# Patient Record
Sex: MALE | Employment: OTHER | URBAN - METROPOLITAN AREA
[De-identification: names, ages, dates, MRNs, and addresses within clinical notes are randomized per-mention and may not be internally consistent; named-entity substitution may affect disease eponyms.]

---

## 2017-04-25 ENCOUNTER — DOCUMENTATION ONLY (OUTPATIENT)
Dept: LAB | Facility: CLINIC | Age: 72
End: 2017-04-25

## 2017-04-25 DIAGNOSIS — I10 HYPERTENSION GOAL BP (BLOOD PRESSURE) < 150/90: Primary | ICD-10-CM

## 2017-04-25 NOTE — PROGRESS NOTES
This patient has a lab only appointment on 4/27/2017 but does not have future orders. Please review, associate diagnosis and sign pending lab orders for the upcoming appointment.  There are no future scheduled appointments with Dr. Lopez at this time.      Thank you,    [facility]

## 2017-04-25 NOTE — PROGRESS NOTES
Need previsit labs-see orders and close encounter if nothing else needed./Shania Dunn,       4/27/17 lab apt

## 2017-04-27 DIAGNOSIS — I10 HYPERTENSION GOAL BP (BLOOD PRESSURE) < 150/90: ICD-10-CM

## 2017-04-27 LAB
ANION GAP SERPL CALCULATED.3IONS-SCNC: 11 MMOL/L (ref 3–14)
BUN SERPL-MCNC: 18 MG/DL (ref 7–30)
CALCIUM SERPL-MCNC: 8.9 MG/DL (ref 8.5–10.1)
CHLORIDE SERPL-SCNC: 104 MMOL/L (ref 94–109)
CO2 SERPL-SCNC: 25 MMOL/L (ref 20–32)
CREAT SERPL-MCNC: 0.85 MG/DL (ref 0.66–1.25)
GFR SERPL CREATININE-BSD FRML MDRD: 89 ML/MIN/1.7M2
GLUCOSE SERPL-MCNC: 97 MG/DL (ref 70–99)
POTASSIUM SERPL-SCNC: 4.2 MMOL/L (ref 3.4–5.3)
SODIUM SERPL-SCNC: 140 MMOL/L (ref 133–144)

## 2017-04-27 PROCEDURE — 36415 COLL VENOUS BLD VENIPUNCTURE: CPT | Performed by: FAMILY MEDICINE

## 2017-04-27 PROCEDURE — 80048 BASIC METABOLIC PNL TOTAL CA: CPT | Performed by: FAMILY MEDICINE

## 2017-06-09 ENCOUNTER — OFFICE VISIT (OUTPATIENT)
Dept: FAMILY MEDICINE | Facility: CLINIC | Age: 72
End: 2017-06-09
Payer: COMMERCIAL

## 2017-06-09 ENCOUNTER — RADIANT APPOINTMENT (OUTPATIENT)
Dept: GENERAL RADIOLOGY | Facility: CLINIC | Age: 72
End: 2017-06-09
Attending: FAMILY MEDICINE
Payer: COMMERCIAL

## 2017-06-09 VITALS
TEMPERATURE: 98.3 F | SYSTOLIC BLOOD PRESSURE: 123 MMHG | BODY MASS INDEX: 35.7 KG/M2 | OXYGEN SATURATION: 95 % | HEART RATE: 82 BPM | DIASTOLIC BLOOD PRESSURE: 77 MMHG | WEIGHT: 256 LBS

## 2017-06-09 DIAGNOSIS — J98.6 PARALYZED HEMIDIAPHRAGM: ICD-10-CM

## 2017-06-09 DIAGNOSIS — R07.9 ACUTE CHEST PAIN: Primary | ICD-10-CM

## 2017-06-09 DIAGNOSIS — R07.9 ACUTE CHEST PAIN: ICD-10-CM

## 2017-06-09 DIAGNOSIS — R93.89 ABNORMAL CHEST X-RAY: ICD-10-CM

## 2017-06-09 PROCEDURE — 93000 ELECTROCARDIOGRAM COMPLETE: CPT | Performed by: FAMILY MEDICINE

## 2017-06-09 PROCEDURE — 99214 OFFICE O/P EST MOD 30 MIN: CPT | Performed by: FAMILY MEDICINE

## 2017-06-09 PROCEDURE — 71020 XR CHEST 2 VW: CPT

## 2017-06-09 NOTE — PATIENT INSTRUCTIONS
Please call our [facility] Scheduling Line at [phone] to schedule your:    CT scan

## 2017-06-09 NOTE — PROGRESS NOTES
"SUBJECTIVE:  Rm Maldonado is a 71 year old male who presents to the office with the CC of back and chest pain.  Patient complains of chest pain that feels like a dull ache which he reports \"feels like a sore muscle\".  He reports that a few years ago he had some chest pain and he has had it on and off since then.   About a week ago he started to have pain again. This time it was first noticed in his back just medially to the left scapula.  He feels the pain also involves the left chest     Pain is associated with none.  Pain is exacerbated by no known provoking events.  Pain is mildly relieved by aspirin   Cardiac risk factors: abnormal lipids, family history, hypertension  He quit smoking in 1983  His mother had an MI at age 76 and passed away from that.     He does not exercise regularly.   He denies any chest pain or shortness of breath with exertion  He does do physical yard work around his house  1.5 month(s) ago he went on a bike ride and rode 5 miles without any symptoms. He reports that it took about 30 minute(s) to complete.     His PAST MEDICAL HISTORY has CAD listed but the patient absolutely denies any history of heart problems    Past Medical History:   Diagnosis Date     Arthritis      Coronary artery disease      Hemidiaphragm paralysis     left     History of hernia repair      HTN      Obesity      Other and unspecified hyperlipidemia          Current Outpatient Prescriptions:      lisinopril (PRINIVIL,ZESTRIL) 10 MG tablet, Take 1 tablet (10 mg) by mouth daily, Disp: 90 tablet, Rfl: 1     metoprolol (LOPRESSOR) 50 MG tablet, Take 1 tablet (50 mg) by mouth 2 times daily, Disp: 180 tablet, Rfl: 1     simvastatin (ZOCOR) 10 MG tablet, Take 1 tablet (10 mg) by mouth At Bedtime, Disp: 90 tablet, Rfl: 3     vitamin E 400 UNITS TABS, Take 400 Units by mouth daily, Disp: , Rfl:      calcium carbonate (OS-GENE 500 MG [place]. [place]) 500 MG tablet, Take 1,000 mg by mouth daily, Disp: , Rfl:      VITAMIN D, " CHOLECALCIFEROL, PO, Take 5,000 Units by mouth daily, Disp: , Rfl:      Ascorbic Acid (VITAMIN C PO), Take 2,000 mg by mouth., Disp: , Rfl:      lecithin 1200 MG CAPS, Take 400 mg by mouth daily , Disp: , Rfl:      aspirin 325 MG EC tablet, Take 325 mg by mouth daily., Disp: , Rfl:      fluocinonide (LIDEX) 0.05 % cream, Apply  topically 2 times daily. Apply to affected area as needed., Disp: 45 g, Rfl: 0     Omega-3 Fatty Acids (FISH OIL) 1200 MG capsule, Take 2 capsules by mouth daily , Disp: , Rfl:     Social History   Substance Use Topics     Smoking status: Former Smoker     Packs/day: 1.00     Years: 10.00     Types: Cigarettes, Pipe     Start date: 5/15/1968     Quit date: 8/19/1983     Smokeless tobacco: Never Used     Alcohol use Yes      Comment: occaional       ROS:CONSTITUTIONAL:NEGATIVE for fever, chills, change in weight    EXAM:  /77  Pulse 82  Temp 98.3  F (36.8  C) (Oral)  Wt 256 lb (116.1 kg)  SpO2 95%  BMI 35.7 kg/m2  GENERAL APPEARANCE: healthy, alert and no distress  EYES: EOMI,  PERRL, conjunctiva clear  HENT: ear canals and TM's normal.  Nose and mouth without ulcers, erythema or lesions  NECK: supple, nontender, no lymphadenopathy  RESP: lungs clear to auscultation - no rales, rhonchi or wheezes  CV: regular rates and rhythm, normal S1 S2, no murmur noted  Chest wall non tender.   ABDOMEN:  soft, nontender, no HSM or masses and bowel sounds normal  Extremities: trace edema, no  tenderness  NEURO: Normal strength and tone, sensory exam grossly normal,  normal speech and mentation  SKIN: no suspicious lesions or rashes  Back: non tender      Office EKG demonstrates:  normal axis, normal intervals, no acute ST/T changes c/w ischemia, appears normal, NSR,normal axis, normal intervals, no acute ST/T changes c/w ischemia,no LVH by voltage criteria,unchanged from previous tracings    Narrative              CHEST TWO VIEW   6/9/2017 10:15 AM     INDICATION: Chest pain,  unspecified.    COMPARISON: 10/9/2012             Impression             IMPRESSION: Marked elevation left hemidiaphragm. No new focal  infiltrates or other significant change. Stable heart size, likely  normal, although heart is partially obscured by the elevated  diaphragm. Degenerative changes thoracic spine.    JAVIER OCAMPO MD              Assessment  / IMPRESSION  Atypical chest pain   i am going to get a chest CT as he has a very abnormal CHEST X-RAY  Plan based on CT and any persisting symptom(s)

## 2017-06-09 NOTE — MR AVS SNAPSHOT
After Visit Summary   6/9/2017    Rm Maldonado    MRN: [id number]           Patient Information     Date Of Birth          1945        Visit Information        Provider Department      6/9/2017 9:30 AM Jeison Bob MD [facility]        Today's Diagnoses     Acute chest pain    -  1    Abnormal chest x-ray        Paralyzed hemidiaphragm          Care Instructions    Please call our [facility] Scheduling Line at [phone] to schedule your:    CT scan                   Follow-ups after your visit        Future tests that were ordered for you today     Open Future Orders        Priority Expected Expires Ordered    CT Chest Abdomen w Contrast Routine  6/9/2018 6/9/2017            Who to contact     If you have questions or need follow up information about today's clinic visit or your schedule please contact [facility] directly at [phone].  Normal or non-critical lab and imaging results will be communicated to you by [employer], letter or phone within 4 business days after the clinic has received the results. If you do not hear from us within 7 days, please contact the clinic through [employer] or phone. If you have a critical or abnormal lab result, we will notify you by phone as soon as possible.  Submit refill requests through [employer] or call your pharmacy and they will forward the refill request to us. Please allow 3 business days for your refill to be completed.          Additional Information About Your Visit        MyChart Information     [employer] gives you secure access to your electronic health record. If you see a primary care provider, you can also send messages to your care team and make appointments. If you have questions, please call your primary care clinic.  If you do not have a primary care provider, please call [phone] and they will assist you.        Care EveryWhere ID     This is your Care EveryWhere ID. This could be used by other  organizations to access your [place] medical records  [id number]        Your Vitals Were     Pulse Temperature Pulse Oximetry BMI (Body Mass Index)          82 98.3  F (36.8  C) (Oral) 95% 35.7 kg/m2         Blood Pressure from Last 3 Encounters:   06/09/17 123/77   11/14/16 130/86   08/18/16 104/76    Weight from Last 3 Encounters:   06/09/17 256 lb (116.1 kg)   11/14/16 248 lb (112.5 kg)   08/11/16 240 lb (108.9 kg)              We Performed the Following     EKG 12-lead complete w/read - Clinics        Primary Care Provider Office Phone # Fax #    Riddhi Lopez [phone]       [facility] [address] [address]        Thank you!     Thank you for choosing [facility]  for your care. Our goal is always to provide you with excellent care. Hearing back from our patients is one way we can continue to improve our services. Please take a few minutes to complete the written survey that you may receive in the mail after your visit with us. Thank you!             Your Updated Medication List - Protect others around you: Learn how to safely use, store and throw away your medicines at www.disposemymeds.org.          This list is accurate as of: 6/9/17 10:47 AM.  Always use your most recent med list.                   Brand Name Dispense Instructions for use    aspirin 325 MG EC tablet      Take 325 mg by mouth daily.       calcium carbonate 1250 MG tablet    OS-GENE 500 mg [place]. Ca     Take 1,000 mg by mouth daily       Fish Oil 1200 MG Caps      Take 2 capsules by mouth daily       fluocinonide 0.05 % cream    LIDEX    45 g    Apply  topically 2 times daily. Apply to affected area as needed.       lecithin 1200 MG Caps capsule    lecithin     Take 400 mg by mouth daily       lisinopril 10 MG tablet    PRINIVIL/ZESTRIL    90 tablet    Take 1 tablet (10 mg) by mouth daily       metoprolol 50 MG tablet    LOPRESSOR    180 tablet    Take 1 tablet (50 mg) by  mouth 2 times daily       simvastatin 10 MG tablet    ZOCOR    90 tablet    Take 1 tablet (10 mg) by mouth At Bedtime       VITAMIN C PO      Take 2,000 mg by mouth.       VITAMIN D (CHOLECALCIFEROL) PO      Take 5,000 Units by mouth daily       vitamin E 400 UNITS Tabs      Take 400 Units by mouth daily

## 2017-06-09 NOTE — NURSING NOTE
"Chief Complaint   Patient presents with     Chest Pain     left shoulder pain (discomfort)  no pain with sitting x 1 week     Cough     feels like something is in his throat       Initial /77  Pulse 82  Temp 98.3  F (36.8  C) (Oral)  Wt 256 lb (116.1 kg)  SpO2 95%  BMI 35.7 kg/m2 Estimated body mass index is 35.7 kg/(m^2) as calculated from the following:    Height as of 11/14/16: 5' 11\" (1.803 m).    Weight as of this encounter: 256 lb (116.1 kg).  Medication Reconciliation: complete   Elvira Cano M.A.      "

## 2017-06-12 ENCOUNTER — MYC MEDICAL ADVICE (OUTPATIENT)
Dept: FAMILY MEDICINE | Facility: CLINIC | Age: 72
End: 2017-06-12

## 2017-06-14 ENCOUNTER — RADIANT APPOINTMENT (OUTPATIENT)
Dept: CT IMAGING | Facility: CLINIC | Age: 72
End: 2017-06-14
Attending: FAMILY MEDICINE
Payer: COMMERCIAL

## 2017-06-14 DIAGNOSIS — R93.89 ABNORMAL CHEST X-RAY: ICD-10-CM

## 2017-06-14 DIAGNOSIS — J98.6 PARALYZED HEMIDIAPHRAGM: ICD-10-CM

## 2017-06-14 DIAGNOSIS — R07.9 ACUTE CHEST PAIN: ICD-10-CM

## 2017-06-14 PROCEDURE — 71260 CT THORAX DX C+: CPT | Mod: TC

## 2017-06-14 PROCEDURE — 74160 CT ABDOMEN W/CONTRAST: CPT | Mod: TC

## 2017-06-14 RX ORDER — IOPAMIDOL 755 MG/ML
100 INJECTION, SOLUTION INTRAVASCULAR ONCE
Status: COMPLETED | OUTPATIENT
Start: 2017-06-14 | End: 2017-06-14

## 2017-06-14 RX ADMIN — IOPAMIDOL 100 ML: 755 INJECTION, SOLUTION INTRAVASCULAR at 10:56

## 2017-06-14 NOTE — PROGRESS NOTES
Rm,  I have reviewed the report of the imaging test or tests that we recently ordered. The results were normal or considered normal for you. It did not identify any cause for the chest pains you had been having.if your symptom(s) are persisting please follow up with your primary medical provider in a few weeks.   Sincerely,   Jeison Bob

## 2017-09-15 DIAGNOSIS — I10 HYPERTENSION GOAL BP (BLOOD PRESSURE) < 150/90: ICD-10-CM

## 2017-09-15 RX ORDER — LISINOPRIL 10 MG/1
TABLET ORAL
Qty: 90 TABLET | Refills: 1 | Status: SHIPPED | OUTPATIENT
Start: 2017-09-15 | End: 2017-09-21 | Stop reason: SINTOL

## 2017-09-18 ENCOUNTER — DOCUMENTATION ONLY (OUTPATIENT)
Dept: LAB | Facility: CLINIC | Age: 72
End: 2017-09-18

## 2017-09-18 DIAGNOSIS — E78.5 HYPERLIPIDEMIA LDL GOAL <130: ICD-10-CM

## 2017-09-18 DIAGNOSIS — I10 HYPERTENSION GOAL BP (BLOOD PRESSURE) < 150/90: Primary | ICD-10-CM

## 2017-09-18 NOTE — PROGRESS NOTES
This patient has a lab only appointment on 9/20/2017 but does not have future orders. Please review, associate diagnosis and sign pending lab orders for the upcoming appointment.  He has an appointment with Dr. Lopez on 9/27/2017.    Thank you,    [facility]

## 2017-09-20 DIAGNOSIS — I10 HYPERTENSION GOAL BP (BLOOD PRESSURE) < 150/90: ICD-10-CM

## 2017-09-20 DIAGNOSIS — E78.5 HYPERLIPIDEMIA LDL GOAL <130: ICD-10-CM

## 2017-09-20 LAB
ANION GAP SERPL CALCULATED.3IONS-SCNC: 9 MMOL/L (ref 3–14)
BUN SERPL-MCNC: 14 MG/DL (ref 7–30)
CALCIUM SERPL-MCNC: 8.8 MG/DL (ref 8.5–10.1)
CHLORIDE SERPL-SCNC: 100 MMOL/L (ref 94–109)
CHOLEST SERPL-MCNC: 211 MG/DL
CO2 SERPL-SCNC: 28 MMOL/L (ref 20–32)
CREAT SERPL-MCNC: 1 MG/DL (ref 0.66–1.25)
GFR SERPL CREATININE-BSD FRML MDRD: 74 ML/MIN/1.7M2
GLUCOSE SERPL-MCNC: 108 MG/DL (ref 70–99)
HDLC SERPL-MCNC: 60 MG/DL
LDLC SERPL CALC-MCNC: 120 MG/DL
NONHDLC SERPL-MCNC: 151 MG/DL
POTASSIUM SERPL-SCNC: 4.1 MMOL/L (ref 3.4–5.3)
SODIUM SERPL-SCNC: 137 MMOL/L (ref 133–144)
TRIGL SERPL-MCNC: 155 MG/DL

## 2017-09-20 PROCEDURE — 36415 COLL VENOUS BLD VENIPUNCTURE: CPT | Performed by: FAMILY MEDICINE

## 2017-09-20 PROCEDURE — 80048 BASIC METABOLIC PNL TOTAL CA: CPT | Performed by: FAMILY MEDICINE

## 2017-09-20 PROCEDURE — 80061 LIPID PANEL: CPT | Performed by: FAMILY MEDICINE

## 2017-09-21 ENCOUNTER — OFFICE VISIT (OUTPATIENT)
Dept: FAMILY MEDICINE | Facility: CLINIC | Age: 72
End: 2017-09-21
Payer: COMMERCIAL

## 2017-09-21 VITALS
SYSTOLIC BLOOD PRESSURE: 132 MMHG | DIASTOLIC BLOOD PRESSURE: 79 MMHG | HEART RATE: 112 BPM | BODY MASS INDEX: 35.7 KG/M2 | WEIGHT: 256 LBS | OXYGEN SATURATION: 93 % | TEMPERATURE: 100.3 F

## 2017-09-21 DIAGNOSIS — J06.9 UPPER RESPIRATORY TRACT INFECTION, UNSPECIFIED TYPE: Primary | ICD-10-CM

## 2017-09-21 DIAGNOSIS — I10 HYPERTENSION GOAL BP (BLOOD PRESSURE) < 150/90: ICD-10-CM

## 2017-09-21 PROCEDURE — 99214 OFFICE O/P EST MOD 30 MIN: CPT | Performed by: FAMILY MEDICINE

## 2017-09-21 RX ORDER — CODEINE PHOSPHATE AND GUAIFENESIN 10; 100 MG/5ML; MG/5ML
1-2 SOLUTION ORAL EVERY 4 HOURS PRN
Qty: 180 ML | Refills: 0 | Status: SHIPPED | OUTPATIENT
Start: 2017-09-21 | End: 2018-06-06

## 2017-09-21 RX ORDER — LOSARTAN POTASSIUM 25 MG/1
25 TABLET ORAL DAILY
Qty: 30 TABLET | Refills: 0 | Status: SHIPPED | OUTPATIENT
Start: 2017-09-21 | End: 2017-09-27

## 2017-09-21 NOTE — MR AVS SNAPSHOT
After Visit Summary   9/21/2017    Rm Maldonado    MRN: [id number]           Patient Information     Date Of Birth          1945        Visit Information        Provider Department      9/21/2017 2:10 PM Jatinder Giraldo MD [facility]        Today's Diagnoses     Upper respiratory tract infection, unspecified type    -  1    Hypertension goal BP (blood pressure) < 150/90          Care Instructions    1. I think you have viral illness that should resolve with time. No need for antibiotics.    2. Robitussin with codeine as needed - no driving within 4 hours of taking this. Do not mix with alcohol or other sedatives.    3. The cough you have had for about one year is related to Lisinopril. Please stop that and the cough will resolve within 3 weeks.    4. Start Losartan 25 mg daily.    5. Keep your appointment with Dr. Lopez on 9/27/17.          Follow-ups after your visit        Your next 10 appointments already scheduled     Sep 27, 2017  9:15 AM CDT   [facility]    [address] [address] [phone]              Who to contact     If you have questions or need follow up information about today's clinic visit or your schedule please contact [facility] directly at [phone].  Normal or non-critical lab and imaging results will be communicated to you by MyChart, letter or phone within 4 business days after the clinic has received the results. If you do not hear from us within 7 days, please contact the clinic through MyChart or phone. If you have a critical or abnormal lab result, we will notify you by phone as soon as possible.  Submit refill requests through [employer] or call your pharmacy and they will forward the refill request to us. Please allow 3 business days for your refill to be completed.          Additional Information About Your Visit        [facility]  Information     [email or web address] gives you secure access to your electronic health record. If you see a primary care provider, you can also send messages to your care team and make appointments. If you have questions, please call your primary care clinic.  If you do not have a primary care provider, please call [phone] and they will assist you.        Care EveryWhere ID     This is your Care EveryWhere ID. This could be used by other organizations to access your [place] medical records  [id number]        Your Vitals Were     Pulse Temperature Pulse Oximetry BMI (Body Mass Index)          112 100.3  F (37.9  C) (Tympanic) 93% 35.7 kg/m2         Blood Pressure from Last 3 Encounters:   09/21/17 132/79   06/09/17 123/77   11/14/16 130/86    Weight from Last 3 Encounters:   09/21/17 256 lb (116.1 kg)   06/09/17 256 lb (116.1 kg)   11/14/16 248 lb (112.5 kg)              Today, you had the following     No orders found for display         Today's Medication Changes          These changes are accurate as of: 9/21/17  2:43 PM.  If you have any questions, ask your nurse or doctor.               Start taking these medicines.        Dose/Directions    guaiFENesin-codeine 100-10 MG/5ML Soln solution   Commonly known as:  ROBITUSSIN AC   Used for:  Upper respiratory tract infection, unspecified type   Started by:  Jatinder Giraldo MD        Dose:  1-2 tsp.   Take 5-10 mLs by mouth every 4 hours as needed for cough   Quantity:  180 mL   Refills:  0       losartan 25 MG tablet   Commonly known as:  COZAAR   Used for:  Hypertension goal BP (blood pressure) < 150/90   Started by:  Jatinder Giraldo MD        Dose:  25 mg   Take 1 tablet (25 mg) by mouth daily   Quantity:  30 tablet   Refills:  0            Where to get your medicines      These medications were sent to [facility] - [place], [place] - [address]  [address] [address]     Phone:  [phone]     losartan 25 MG tablet         Some  of these will need a paper prescription and others can be bought over the counter.  Ask your nurse if you have questions.     Bring a paper prescription for each of these medications     guaiFENesin-codeine 100-10 MG/5ML Soln solution                Primary Care Provider Office Phone # Fax #    Riddhi Jenny Lopez [phone] [address] [facility] [facility] [address]        Equal Access to Services     RADHA FELDMAN : Hadii aad ku hadasho Soomaali, waaxda luqadaha, qaybta kaalmada adeegyada, waxay idiin hayaan adeeg khaneesh laGladismichael . So [facility] [phone].    ATENCIÓN: Si habla espmelvi, tiene a piedra disposición servicios gratuitos de asistencia lingüística. Stephename al [phone].    We comply with applicable federal civil rights laws and [place] laws. We do not discriminate on the basis of race, color, national origin, age, disability sex, sexual orientation or gender identity.            Thank you!     Thank you for choosing [facility]  for your care. Our goal is always to provide you with excellent care. Hearing back from our patients is one way we can continue to improve our services. Please take a few minutes to complete the written survey that you may receive in the mail after your visit with us. Thank you!             Your Updated Medication List - Protect others around you: Learn how to safely use, store and throw away your medicines at www.disposemymeds.org.          This list is accurate as of: 9/21/17  2:43 PM.  Always use your most recent med list.                   Brand Name Dispense Instructions for use Diagnosis    aspirin 325 MG EC tablet      Take 325 mg by mouth daily.        calcium carbonate 1250 MG tablet    OS-GENE 500 mg [place]. [place]     Take 1,000 mg by mouth daily        Fish Oil 1200 MG Caps      Take 2 capsules by mouth daily        fluocinonide 0.05 % cream    LIDEX    45 g    Apply  topically 2 times daily. Apply to affected area as needed.    Eczema        guaiFENesin-codeine 100-10 MG/5ML Soln solution    ROBITUSSIN AC    180 mL    Take 5-10 mLs by mouth every 4 hours as needed for cough    Upper respiratory tract infection, unspecified type       lecithin 1200 MG Caps capsule    lecithin     Take 400 mg by mouth daily        lisinopril 10 MG tablet    PRINIVIL/ZESTRIL    90 tablet    TAKE 1 TABLET EVERY DAY    Hypertension goal BP (blood pressure) < 150/90       losartan 25 MG tablet    COZAAR    30 tablet    Take 1 tablet (25 mg) by mouth daily    Hypertension goal BP (blood pressure) < 150/90       metoprolol 50 MG tablet    LOPRESSOR    180 tablet    Take 1 tablet (50 mg) by mouth 2 times daily    Hypertension goal BP (blood pressure) < 150/90       simvastatin 10 MG tablet    ZOCOR    90 tablet    Take 1 tablet (10 mg) by mouth At Bedtime    Hyperlipidemia LDL goal <130       VITAMIN C PO      Take 2,000 mg by mouth.        VITAMIN D (CHOLECALCIFEROL) PO      Take 5,000 Units by mouth daily        vitamin E 400 UNITS Tabs      Take 400 Units by mouth daily

## 2017-09-21 NOTE — PATIENT INSTRUCTIONS
1. I think you have viral illness that should resolve with time. No need for antibiotics.    2. Robitussin with codeine as needed - no driving within 4 hours of taking this. Do not mix with alcohol or other sedatives.    3. The cough you have had for about one year is related to Lisinopril. Please stop that and the cough will resolve within 3 weeks.    4. Start Losartan 25 mg daily.    5. Keep your appointment with Dr. Lopez on 9/27/17.

## 2017-09-21 NOTE — NURSING NOTE
"Chief Complaint   Patient presents with     URI     Cold SX per pt x 1 day now        Initial /79  Pulse 112  Temp 100.3  F (37.9  C) (Tympanic)  Wt 256 lb (116.1 kg)  SpO2 93%  BMI 35.7 kg/m2 Estimated body mass index is 35.7 kg/(m^2) as calculated from the following:    Height as of 11/14/16: 5' 11\" (1.803 m).    Weight as of this encounter: 256 lb (116.1 kg).  Medication Reconciliation: complete      Devin Beltran MA    "

## 2017-09-21 NOTE — PROGRESS NOTES
HPI:    Rm is a 72 year old male here to discuss:    URI - since yesterday, he has had cough which is productive of non blood sputum. Denies shortness of breath. No ear pain, runny nose, sinus pain, sore throat.   Evaluation and treatment:    Should be self limited.   Robitussin AC prn - warnings discussed.    Cough - since 2016 he has had a different kind of cough. This is mild tickle in the throat without other symptoms.  Evaluation and treatment:    Most likely related to Lisinopril - see below.    HTN - not checking at home. Controlled in clinic.  Evaluation and treatment:    Lisinopril 10 mg qd - causing chronic mild cough (tickle in throat)   I asked him to stop Lisinopril   Start Losartan 25 mg qd.  Last Basic Metabolic Panel:  Lab Results   Component Value Date     09/20/2017      Lab Results   Component Value Date    POTASSIUM 4.1 09/20/2017     Lab Results   Component Value Date    CHLORIDE 100 09/20/2017     Lab Results   Component Value Date    GENE 8.8 09/20/2017     Lab Results   Component Value Date    CO2 28 09/20/2017     Lab Results   Component Value Date    BUN 14 09/20/2017     Lab Results   Component Value Date    CR 1.00 09/20/2017     Lab Results   Component Value Date     09/20/2017     Paralyzed left hemidiaphragm - asymptomatic.    ROS:   Const: No weight changes.   ENT: No sore throat or ear pain.   Resp: No shortness of breath.   CV: No chest pain, dizziness or cardiac palpitations.   GI: No nausea, vomiting, diarrhea or constipation. Denies blood in stools or black stools.   : No dysuria, frequency or hematuria.     SH:     . Three kids. Works as . Non smoker. Etoh occ. Caffeine 5/day.    Exam:    /79  Pulse 112  Temp 100.3  F (37.9  C) (Tympanic)  Wt 256 lb (116.1 kg)  SpO2 93%  BMI 35.7 kg/m2    Gen: Healthy appearing male in no acute distress   Neck: No enlarged lymph nodes, thyromegally or other masses.   Lungs: Left lower  lung field with almost no air movement. This is baseline for him. Otherwise clear to ascultation.   CV: Heart RRR with no murmurs. No JVD, carotid bruits or leg edema.     Assessment and Plan - Decision Making    1. Upper respiratory tract infection, unspecified type  Per HPI  - guaiFENesin-codeine (ROBITUSSIN AC) 100-10 MG/5ML SOLN solution; Take 5-10 mLs by mouth every 4 hours as needed for cough  Dispense: 180 mL; Refill: 0    2. Hypertension goal BP (blood pressure) < 150/90  Per HPI  - losartan (COZAAR) 25 MG tablet; Take 1 tablet (25 mg) by mouth daily  Dispense: 30 tablet; Refill: 0      Written instructions given as follows:    Patient Instructions   1. I think you have viral illness that should resolve with time. No need for antibiotics.    2. Robitussin with codeine as needed - no driving within 4 hours of taking this. Do not mix with alcohol or other sedatives.    3. The cough you have had for about one year is related to Lisinopril. Please stop that and the cough will resolve within 3 weeks.    4. Start Losartan 25 mg daily.    5. Keep your appointment with Dr. Lopez on 9/27/17.

## 2017-09-25 NOTE — PROGRESS NOTES
SUBJECTIVE:   Rm Maldonado is a 72 year old male who presents to clinic today for the following health issues:      Hyperlipidemia Follow-Up      Rate your low fat/cholesterol diet?: good    Taking statin?  Yes    Other lipid medications/supplements?:  Fish oil/Omega 3, dose 1200mg without side effects        Amount of exercise or physical activity: None    Problems taking medications regularly: No    Medication side effects: none  Diet: low salt      ENT Symptoms             Symptoms: cc Present Absent Comment   Fever/Chills  x  Ws having chills/sweats    Fatigue  x     Muscle Aches   x    Eye Irritation   x    Sneezing   x    Nasal Quan/Drg   x    Sinus Pressure/Pain   x    Loss of smell   x    Dental pain   x    Sore Throat   x    Swollen Glands   x    Ear Pain/Fullness   x    Cough  x  Cough has improved some but still coughing when supine, dry cough   Wheeze  x     Chest Pain   x    Shortness of breath   x    Rash   x    Other    Pt reports no shortness of breath, LE swelling or chest pain,      Symptom duration:  2 weeks   Symptom severity:  Moderate   Treatments tried:  Robitussin   Contacts:  none           Problem list and histories reviewed & adjusted, as indicated.  Additional history: as documented    Patient Active Problem List   Diagnosis     Exogenous obesity     Hypertension goal BP (blood pressure) < 150/90     Eczema     Advanced directives, counseling/discussion     Vitamin D deficiency     Colon polyp     Paralyzed hemidiaphragm     Hyperlipidemia LDL goal <130     Past Surgical History:   Procedure Laterality Date     ABDOMEN SURGERY       BIOPSY       COLONOSCOPY       COLONOSCOPY WITH CO2 INSUFFLATION N/A 8/18/2016    Procedure: COLONOSCOPY WITH CO2 INSUFFLATION;  Surgeon: Ariel Manzanares MD;  Location: [facility]     EYE SURGERY      left cataract      HERNIA REPAIR       HERNIA REPAIR       TONSILLECTOMY         Social History   Substance Use Topics     Smoking status: Former Smoker      Packs/day: 1.00     Years: 10.00     Types: Cigarettes, Pipe     Start date: 5/15/1968     Quit date: 8/19/1983     Smokeless tobacco: Never Used     Alcohol use Yes      Comment: Minimal to light     Family History   Problem Relation Age of Onset     C.A.D. Mother      HEART DISEASE Mother      DIABETES Mother      Obesity Mother      Alcohol/Drug Father      Obesity Daughter          Current Outpatient Prescriptions   Medication Sig Dispense Refill     losartan (COZAAR) 25 MG tablet Take 1 tablet (25 mg) by mouth daily 90 tablet 1     metoprolol (LOPRESSOR) 50 MG tablet Take 1 tablet (50 mg) by mouth 2 times daily 180 tablet 1     simvastatin (ZOCOR) 10 MG tablet Take 1 tablet (10 mg) by mouth At Bedtime 90 tablet 3     furosemide (LASIX) 20 MG tablet Take 1 tablet (20 mg) by mouth 2 times daily 60 tablet 1     guaiFENesin-codeine (ROBITUSSIN AC) 100-10 MG/5ML SOLN solution Take 5-10 mLs by mouth every 4 hours as needed for cough 180 mL 0     vitamin E 400 UNITS TABS Take 400 Units by mouth daily       calcium carbonate (OS-GENE 500 MG [place]. [place]) 500 MG tablet Take 1,000 mg by mouth daily       VITAMIN D, CHOLECALCIFEROL, PO Take 5,000 Units by mouth daily       Ascorbic Acid (VITAMIN C PO) Take 2,000 mg by mouth.       lecithin 1200 MG CAPS Take 400 mg by mouth daily        aspirin 325 MG EC tablet Take 325 mg by mouth daily.       fluocinonide (LIDEX) 0.05 % cream Apply  topically 2 times daily. Apply to affected area as needed. 45 g 0     Omega-3 Fatty Acids (FISH OIL) 1200 MG capsule Take 2 capsules by mouth daily        azithromycin (ZITHROMAX) 250 MG tablet Two tablets first day, then one tablet daily for four days. 6 tablet 0     BP Readings from Last 3 Encounters:   09/27/17 129/81   09/26/17 138/84   09/21/17 132/79    Wt Readings from Last 3 Encounters:   09/27/17 256 lb 3.2 oz (116.2 kg)   09/21/17 256 lb (116.1 kg)   06/09/17 256 lb (116.1 kg)                  Labs reviewed in EPIC        Reviewed  "and updated as needed this visit by clinical staffTobacco  Allergies  Meds  Problems  Med Hx  Surg Hx  Fam Hx  Soc Hx        Reviewed and updated as needed this visit by Provider  Allergies  Meds  Problems         ROS:  Constitutional, HEENT, cardiovascular, pulmonary, gi and gu systems are negative, except as otherwise noted.      OBJECTIVE:   /81  Pulse 78  Temp 98  F (36.7  C) (Oral)  Ht 5' 10.25\" (1.784 m)  Wt 256 lb 3.2 oz (116.2 kg)  SpO2 96%  BMI 36.5 kg/m2  Body mass index is 36.5 kg/(m^2).  GENERAL: healthy, alert and no distress  EYES: Eyes grossly normal to inspection, PERRL and conjunctivae and sclerae normal  HENT: ear canals and TM's normal, nose and mouth without ulcers or lesions  NECK: no adenopathy, no asymmetry, masses, or scars and thyroid normal to palpation  RESP: lungs clear to auscultation - no rales, rhonchi or wheezes  CV: regular rate and rhythm, normal S1 S2, no S3 or S4, no murmur, click or rub, no peripheral edema and peripheral pulses strong  MS: no gross musculoskeletal defects noted, no edema  SKIN: no suspicious lesions or rashes  PSYCH: mentation appears normal, affect normal/bright    Diagnostic Test Results: left hemidiaphragn elevated - stable  Patchy infiltrate/increased markings RLL      ASSESSMENT/PLAN:     (R05) Cough  (primary encounter diagnosis)  Comment: uncertain etiology cannot r/o CHF  Plan: XR Chest 2 Views, Echocardiogram Complete,         BNP-N terminal pro, furosemide (LASIX) 20 MG         tablet, DISCONTINUED: furosemide (LASIX) 20 MG         tablet        Check BNP, trial of lasix 20 mg daily for next 5 days  If no change or worsening, will treat for pneumonia  Need to consider losartan as source of cough but has tolerated for years.      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

## 2017-09-26 ENCOUNTER — ALLIED HEALTH/NURSE VISIT (OUTPATIENT)
Dept: FAMILY MEDICINE | Facility: CLINIC | Age: 72
End: 2017-09-26
Payer: COMMERCIAL

## 2017-09-26 VITALS — HEART RATE: 96 BPM | SYSTOLIC BLOOD PRESSURE: 138 MMHG | DIASTOLIC BLOOD PRESSURE: 84 MMHG

## 2017-09-26 DIAGNOSIS — Z01.30 BP CHECK: Primary | ICD-10-CM

## 2017-09-26 PROCEDURE — 99207 ZZC NO CHARGE NURSE ONLY: CPT | Performed by: FAMILY MEDICINE

## 2017-09-26 NOTE — PROGRESS NOTES
Rm Maldonado is enrolled/participating in the retail pharmacy Blood Pressure Goals Achievement Program (BPGAP).  Rm Maldonado was evaluated at [facility] on September 26, 2017 at which time his blood pressure was:    BP Readings from Last 3 Encounters:   09/26/17 138/84   09/21/17 132/79   06/09/17 123/77     Reviewed lifestyle modifications for blood pressure control and reduction: including making healthy food choices, managing weight, getting regular exercise, smoking cessation, reducing alcohol consumption, monitoring blood pressure regularly.     Rm Maldonado is not experiencing symptoms.    Follow-Up: BP is at goal of < 150/90 mmHg (patient 60+ years of age without diabetes).  Recommended follow-up at pharmacy in 6 months.     Recommendation to Provider: Riddhi Lopez    Rm Maldonado was evaluated for enrollment into the PGEN study today.    Patient eligible for enrollment:  No  Patient interested in enrollment:  No    Completed by: Victor M Steiner RPh.  [facility]  [phone]

## 2017-09-26 NOTE — MR AVS SNAPSHOT
After Visit Summary   9/26/2017    Rm Maldonado    MRN: [id number]           Patient Information     Date Of Birth          1945        Visit Information        Provider Department      9/26/2017 11:04 AM Riddhi Lopez MD [facility]        Today's Diagnoses     BP check    -  1       Follow-ups after your visit        Your next 10 appointments already scheduled     Sep 27, 2017  9:15 AM CDT   SHORT with Riddhi Lopez MD   [facility]    [address] [address] [phone]              Who to contact     If you have questions or need follow up information about today's clinic visit or your schedule please contact [facility] directly at [phone].  Normal or non-critical lab and imaging results will be communicated to you by MyChart, letter or phone within 4 business days after the clinic has received the results. If you do not hear from us within 7 days, please contact the clinic through MyChart or phone. If you have a critical or abnormal lab result, we will notify you by phone as soon as possible.  Submit refill requests through Fotolog or call your pharmacy and they will forward the refill request to us. Please allow 3 business days for your refill to be completed.          Additional Information About Your Visit        MyChart Information     [employer] gives you secure access to your electronic health record. If you see a primary care provider, you can also send messages to your care team and make appointments. If you have questions, please call your primary care clinic.  If you do not have a primary care provider, please call [phone] and they will assist you.        Care EveryWhere ID     This is your Care EveryWhere ID. This could be used by other organizations to access your [place] medical records  [id number]        Your Vitals Were     Pulse                   96             Blood Pressure from Last 3 Encounters:   09/26/17 138/84   09/21/17 132/79   06/09/17 123/77    Weight from Last 3 Encounters:   09/21/17 256 lb (116.1 kg)   06/09/17 256 lb (116.1 kg)   11/14/16 248 lb (112.5 kg)              Today, you had the following     No orders found for display       Primary Care Provider Office Phone # Fax #    Riddhi Jenny Lopez [phone] [address] [address] [facility] [address]        Equal Access to Services     [facility]: Hadii aad ku hadasho Soomaali, waaxda luqadaha, qaybta kaalmada adean, rodrigo dawn . So [facility] [phone].    ATENCIÓN: Si habla español, tiene a piedra disposición servicios gratuitos de asistencia lingüística. [facility] [phone].    We comply with applicable federal civil rights laws and [place] laws. We do not discriminate on the basis of race, color, national origin, age, disability sex, sexual orientation or gender identity.            Thank you!     Thank you for choosing [facility]  for your care. Our goal is always to provide you with excellent care. Hearing back from our patients is one way we can continue to improve our services. Please take a few minutes to complete the written survey that you may receive in the mail after your visit with us. Thank you!             Your Updated Medication List - Protect others around you: Learn how to safely use, store and throw away your medicines at www.disposemymeds.org.          This list is accurate as of: 9/26/17 11:14 AM.  Always use your most recent med list.                   Brand Name Dispense Instructions for use Diagnosis    aspirin 325 MG EC tablet      Take 325 mg by mouth daily.        calcium carbonate 1250 MG tablet    OS-GENE 500 mg [place]. [place]     Take 1,000 mg by mouth daily        Fish Oil 1200 MG Caps      Take 2 capsules by mouth daily        fluocinonide 0.05 % cream    LIDEX    45 g    Apply  topically 2 times daily. Apply to  affected area as needed.    Eczema       guaiFENesin-codeine 100-10 MG/5ML Soln solution    ROBITUSSIN AC    180 mL    Take 5-10 mLs by mouth every 4 hours as needed for cough    Upper respiratory tract infection, unspecified type       lecithin 1200 MG Caps capsule    lecithin     Take 400 mg by mouth daily        losartan 25 MG tablet    COZAAR    30 tablet    Take 1 tablet (25 mg) by mouth daily    Hypertension goal BP (blood pressure) < 150/90       metoprolol 50 MG tablet    LOPRESSOR    180 tablet    Take 1 tablet (50 mg) by mouth 2 times daily    Hypertension goal BP (blood pressure) < 150/90       simvastatin 10 MG tablet    ZOCOR    90 tablet    Take 1 tablet (10 mg) by mouth At Bedtime    Hyperlipidemia LDL goal <130       VITAMIN C PO      Take 2,000 mg by mouth.        VITAMIN D (CHOLECALCIFEROL) PO      Take 5,000 Units by mouth daily        vitamin E 400 UNITS Tabs      Take 400 Units by mouth daily

## 2017-09-27 ENCOUNTER — OFFICE VISIT (OUTPATIENT)
Dept: FAMILY MEDICINE | Facility: CLINIC | Age: 72
End: 2017-09-27
Payer: COMMERCIAL

## 2017-09-27 ENCOUNTER — RADIANT APPOINTMENT (OUTPATIENT)
Dept: GENERAL RADIOLOGY | Facility: CLINIC | Age: 72
End: 2017-09-27
Attending: FAMILY MEDICINE
Payer: COMMERCIAL

## 2017-09-27 VITALS
WEIGHT: 256.2 LBS | SYSTOLIC BLOOD PRESSURE: 129 MMHG | OXYGEN SATURATION: 96 % | BODY MASS INDEX: 36.68 KG/M2 | HEART RATE: 78 BPM | DIASTOLIC BLOOD PRESSURE: 81 MMHG | HEIGHT: 70 IN | TEMPERATURE: 98 F

## 2017-09-27 DIAGNOSIS — R05.9 COUGH: Primary | ICD-10-CM

## 2017-09-27 DIAGNOSIS — J98.6 PARALYZED HEMIDIAPHRAGM: ICD-10-CM

## 2017-09-27 DIAGNOSIS — E78.5 HYPERLIPIDEMIA LDL GOAL <130: ICD-10-CM

## 2017-09-27 DIAGNOSIS — R05.9 COUGH: ICD-10-CM

## 2017-09-27 DIAGNOSIS — I10 HYPERTENSION GOAL BP (BLOOD PRESSURE) < 150/90: ICD-10-CM

## 2017-09-27 LAB — NT-PROBNP SERPL-MCNC: 35 PG/ML (ref 0–125)

## 2017-09-27 PROCEDURE — 83880 ASSAY OF NATRIURETIC PEPTIDE: CPT | Performed by: FAMILY MEDICINE

## 2017-09-27 PROCEDURE — 99214 OFFICE O/P EST MOD 30 MIN: CPT | Performed by: FAMILY MEDICINE

## 2017-09-27 PROCEDURE — 71020 XR CHEST 2 VW: CPT

## 2017-09-27 PROCEDURE — 36415 COLL VENOUS BLD VENIPUNCTURE: CPT | Performed by: FAMILY MEDICINE

## 2017-09-27 RX ORDER — LOSARTAN POTASSIUM 25 MG/1
25 TABLET ORAL DAILY
Qty: 90 TABLET | Refills: 1 | Status: SHIPPED | OUTPATIENT
Start: 2017-09-27 | End: 2017-12-05

## 2017-09-27 RX ORDER — FUROSEMIDE 20 MG
20 TABLET ORAL 2 TIMES DAILY
Qty: 60 TABLET | Refills: 1 | Status: SHIPPED | OUTPATIENT
Start: 2017-09-27 | End: 2017-09-27

## 2017-09-27 RX ORDER — FUROSEMIDE 20 MG
20 TABLET ORAL 2 TIMES DAILY
Qty: 60 TABLET | Refills: 1 | Status: SHIPPED | OUTPATIENT
Start: 2017-09-27 | End: 2018-06-06

## 2017-09-27 RX ORDER — SIMVASTATIN 10 MG
10 TABLET ORAL AT BEDTIME
Qty: 90 TABLET | Refills: 3 | Status: SHIPPED | OUTPATIENT
Start: 2017-09-27 | End: 2018-06-19

## 2017-09-27 RX ORDER — METOPROLOL TARTRATE 50 MG
50 TABLET ORAL 2 TIMES DAILY
Qty: 180 TABLET | Refills: 1 | Status: SHIPPED | OUTPATIENT
Start: 2017-09-27 | End: 2018-03-22

## 2017-09-27 NOTE — PATIENT INSTRUCTIONS
Take lasix in the morning right away and at 1:00 PM everyday for the next 5 days.      Please  call [phone] to schedule your heart ultrasound.      Send [employer] message on Monday to Riddhi Lopez MD to let her know how you are doing.

## 2017-09-27 NOTE — MR AVS SNAPSHOT
After Visit Summary   9/27/2017    Rm Maldonado    MRN: [id number]           Patient Information     Date Of Birth          1945        Visit Information        Provider Department      9/27/2017 9:15 AM Riddhi Lopez MD [facility]        Today's Diagnoses     Cough    -  1    Paralyzed hemidiaphragm        Hypertension goal BP (blood pressure) < 150/90        Hyperlipidemia LDL goal <130          Care Instructions    Take lasix in the morning right away and at 1:00 PM everyday for the next 5 days.      Please  call [phone] to schedule your heart ultrasound.      Send Go World! message on Monday to Riddhi Lopez MD to let her know how you are doing.          Follow-ups after your visit        Future tests that were ordered for you today     Open Future Orders        Priority Expected Expires Ordered    Echocardiogram Complete Routine  9/27/2018 9/27/2017            Who to contact     If you have questions or need follow up information about today's clinic visit or your schedule please contact [facility] directly at [phone].  Normal or non-critical lab and imaging results will be communicated to you by Simracewayhart, letter or phone within 4 business days after the clinic has received the results. If you do not hear from us within 7 days, please contact the clinic through Go World! or phone. If you have a critical or abnormal lab result, we will notify you by phone as soon as possible.  Submit refill requests through [employer] World! or call your pharmacy and they will forward the refill request to us. Please allow 3 business days for your refill to be completed.          Additional Information About Your Visit        Simracewayhart Information     [employer]! gives you secure access to your electronic health record. If you see a primary care provider, you can also send messages to your care team and make appointments. If you have questions, please call your primary care clinic.   "If you do not have a primary care provider, please call [phone] and they will assist you.        Care EveryWhere ID     This is your Care EveryWhere ID. This could be used by other organizations to access your [place] medical records  [id number]        Your Vitals Were     Pulse Temperature Height Pulse Oximetry BMI (Body Mass Index)       78 98  F (36.7  C) (Oral) 5' 10.25\" (1.784 m) 96% 36.5 kg/m2        Blood Pressure from Last 3 Encounters:   09/27/17 129/81   09/26/17 138/84   09/21/17 132/79    Weight from Last 3 Encounters:   09/27/17 256 lb 3.2 oz (116.2 kg)   09/21/17 256 lb (116.1 kg)   06/09/17 256 lb (116.1 kg)              We Performed the Following     BNP-N terminal pro          Today's Medication Changes          These changes are accurate as of: 9/27/17 10:22 AM.  If you have any questions, ask your nurse or doctor.               Start taking these medicines.        Dose/Directions    furosemide 20 MG tablet   Commonly known as:  LASIX   Used for:  Cough   Started by:  Riddhi Lopez MD        Dose:  20 mg   Take 1 tablet (20 mg) by mouth 2 times daily   Quantity:  60 tablet   Refills:  1            Where to get your medicines      These medications were sent to [address], [place] [id number]     Phone:  [phone]     furosemide 20 MG tablet         These medications were sent to [facility] - [facility] [address]  [address] [address]     Phone:  [phone]     losartan 25 MG tablet    metoprolol 50 MG tablet    simvastatin 10 MG tablet                Primary Care Provider Office Phone # Fax #    Riddhi Lopez [phone] [address] [place] [address]        Equal Access to Services     [facility]: Hadii melvin harris hadasho Soomaali, waaxda luqadaha, qaybta kaalmada carito, rodrigo larry " carlos dawn ah. So [facility] [phone].    ATENCIÓN: Si bala law, tiene a piedra disposición servicios gratuitos de asistencia lingüística. [facility]ian [phone].    We comply with applicable federal civil rights laws and [place] laws. We do not discriminate on the basis of race, color, national origin, age, disability sex, sexual orientation or gender identity.            Thank you!     Thank you for choosing [facility]  for your care. Our goal is always to provide you with excellent care. Hearing back from our patients is one way we can continue to improve our services. Please take a few minutes to complete the written survey that you may receive in the mail after your visit with us. Thank you!             Your Updated Medication List - Protect others around you: Learn how to safely use, store and throw away your medicines at www.disposemymeds.org.          This list is accurate as of: 9/27/17 10:22 AM.  Always use your most recent med list.                   Brand Name Dispense Instructions for use Diagnosis    aspirin 325 MG EC tablet      Take 325 mg by mouth daily.        calcium carbonate 1250 MG tablet    OS-GENE 500 mg [place]. [place]     Take 1,000 mg by mouth daily        Fish Oil 1200 MG Caps      Take 2 capsules by mouth daily        fluocinonide 0.05 % cream    LIDEX    45 g    Apply  topically 2 times daily. Apply to affected area as needed.    Eczema       furosemide 20 MG tablet    LASIX    60 tablet    Take 1 tablet (20 mg) by mouth 2 times daily    Cough       guaiFENesin-codeine 100-10 MG/5ML Soln solution    ROBITUSSIN AC    180 mL    Take 5-10 mLs by mouth every 4 hours as needed for cough    Upper respiratory tract infection, unspecified type       lecithin 1200 MG Caps capsule    lecithin     Take 400 mg by mouth daily        losartan 25 MG tablet    COZAAR    90 tablet    Take 1 tablet (25 mg) by mouth daily    Hypertension goal BP (blood pressure) < 150/90       metoprolol 50 MG  tablet    LOPRESSOR    180 tablet    Take 1 tablet (50 mg) by mouth 2 times daily    Hypertension goal BP (blood pressure) < 150/90       simvastatin 10 MG tablet    ZOCOR    90 tablet    Take 1 tablet (10 mg) by mouth At Bedtime    Hyperlipidemia LDL goal <130       VITAMIN C PO      Take 2,000 mg by mouth.        VITAMIN D (CHOLECALCIFEROL) PO      Take 5,000 Units by mouth daily        vitamin E 400 UNITS Tabs      Take 400 Units by mouth daily

## 2017-09-27 NOTE — NURSING NOTE
"Chief Complaint   Patient presents with     Lipids     Cough     x 2 weeks       Initial /81  Pulse 78  Temp 98  F (36.7  C) (Oral)  Ht 5' 10.25\" (1.784 m)  Wt 256 lb 3.2 oz (116.2 kg)  SpO2 96%  BMI 36.5 kg/m2 Estimated body mass index is 36.5 kg/(m^2) as calculated from the following:    Height as of this encounter: 5' 10.25\" (1.784 m).    Weight as of this encounter: 256 lb 3.2 oz (116.2 kg)..  BP completed using cuff size: andreas Ko CMA    "

## 2017-10-01 ENCOUNTER — MYC MEDICAL ADVICE (OUTPATIENT)
Dept: FAMILY MEDICINE | Facility: CLINIC | Age: 72
End: 2017-10-01

## 2017-10-02 ENCOUNTER — TELEPHONE (OUTPATIENT)
Dept: FAMILY MEDICINE | Facility: CLINIC | Age: 72
End: 2017-10-02

## 2017-10-02 ENCOUNTER — MYC MEDICAL ADVICE (OUTPATIENT)
Dept: FAMILY MEDICINE | Facility: CLINIC | Age: 72
End: 2017-10-02

## 2017-10-02 NOTE — TELEPHONE ENCOUNTER
[facility] Pharmacist is given orders that Lisinopril was d/c'd due to side effects of cough.   Losartan 25 mg is current medication therapy.   Verbalized good understanding.   Samia Hernandez RN

## 2017-10-02 NOTE — TELEPHONE ENCOUNTER
Pharmacy is looking for a clarification.    [facility]  Phone [phone]  Fax- [phone]    Pharmacy states- RX for Losartan 25 mg tab, and patient also has active RX for Lisinopril 10 mg tab. Please clairfy if current therapy is Losartan 25 mg or Lisinopril 10 mg tab.

## 2017-10-02 NOTE — TELEPHONE ENCOUNTER
Per protocol, will route encounter to be cosigned by provider for Verbal Orders.  Samia Hernandez RN

## 2017-10-19 ENCOUNTER — NURSE TRIAGE (OUTPATIENT)
Dept: NURSING | Facility: CLINIC | Age: 72
End: 2017-10-19

## 2017-10-20 NOTE — TELEPHONE ENCOUNTER
Constipation, which is not normally a problem for Rm.  Took Dulcolax tonight, wondering what else he may try?  Has pressure, no pain.    Additional Information    Over-The-Counter (OTC) medicines for mild constipation (all triage questions negative)    Protocols used: CONSTIPATION-ADULT-AH

## 2017-12-29 ENCOUNTER — ALLIED HEALTH/NURSE VISIT (OUTPATIENT)
Dept: FAMILY MEDICINE | Facility: CLINIC | Age: 72
End: 2017-12-29
Payer: COMMERCIAL

## 2017-12-29 VITALS — DIASTOLIC BLOOD PRESSURE: 80 MMHG | SYSTOLIC BLOOD PRESSURE: 138 MMHG

## 2017-12-29 DIAGNOSIS — Z01.30 BP CHECK: ICD-10-CM

## 2017-12-29 DIAGNOSIS — I10 HYPERTENSION GOAL BP (BLOOD PRESSURE) < 150/90: Primary | ICD-10-CM

## 2017-12-29 PROCEDURE — 99207 ZZC NO CHARGE NURSE ONLY: CPT | Performed by: FAMILY MEDICINE

## 2017-12-29 NOTE — PROGRESS NOTES
Rm Maldonado is enrolled/participating in the retail pharmacy Blood Pressure Goals Achievement Program (BPGAP).  Rm Maldonado was evaluated at [facility] on December 29, 2017 at which time his blood pressure was:    BP Readings from Last 3 Encounters:   12/29/17 138/80   09/27/17 129/81   09/26/17 138/84     Reviewed lifestyle modifications for blood pressure control and reduction: including making healthy food choices, managing weight, getting regular exercise, smoking cessation, reducing alcohol consumption, monitoring blood pressure regularly.     Rm Maldonado is not experiencing symptoms.    Follow-Up: BP is at goal of < 140/90mmHg (patient 18+ years of age with or without diabetes).  Recommended follow-up at pharmacy in 6 months.     Recommendation to Provider: .    Rm Maldonado was evaluated for enrollment into the PGEN study today.    Patient eligible for enrollment:  Unknown  Patient interested in enrollment:  Unknown    Completed by: Blossom Jay [facility]    [phone]

## 2017-12-29 NOTE — MR AVS SNAPSHOT
After Visit Summary   12/29/2017    Rm Maldonado    MRN: [id number]           Patient Information     Date Of Birth          1945        Visit Information        Provider Department      12/29/2017 3:52 PM Riddhi Lopez MD [facility]        Today's Diagnoses     Hypertension goal BP (blood pressure) < 150/90    -  1    BP check           Follow-ups after your visit        Who to contact     If you have questions or need follow up information about today's clinic visit or your schedule please contact [facility] directly at [phone].  Normal or non-critical lab and imaging results will be communicated to you by LabMindshart, letter or phone within 4 business days after the clinic has received the results. If you do not hear from us within 7 days, please contact the clinic through SoundTagt or phone. If you have a critical or abnormal lab result, we will notify you by phone as soon as possible.  Submit refill requests through Livemap or call your pharmacy and they will forward the refill request to us. Please allow 3 business days for your refill to be completed.          Additional Information About Your Visit        MyChart Information     Livemap gives you secure access to your electronic health record. If you see a primary care provider, you can also send messages to your care team and make appointments. If you have questions, please call your primary care clinic.  If you do not have a primary care provider, please call [phone] and they will assist you.        Care EveryWhere ID     This is your Care EveryWhere ID. This could be used by other organizations to access your [place] medical records  [id number]         Blood Pressure from Last 3 Encounters:   12/29/17 138/80   09/27/17 129/81   09/26/17 138/84    Weight from Last 3 Encounters:   09/27/17 256 lb 3.2 oz (116.2 kg)   09/21/17 256 lb (116.1 kg)   06/09/17 256 lb (116.1 kg)              Today, you  had the following     No orders found for display       Primary Care Provider Office Phone # Fax #    Riddhi Lopez [phone] [address] [facility] [address]        Equal Access to Services     RADHA FELDMAN : Hadii melvin steven katerina Sobassem, waaxda luqadaha, qaybta kaalmada adedeniseda, rodrigo gonzalez nereyda foster. So [facility] [phone].    ATENCIÓN: Si habla español, tiene a piedra disposición servicios gratuitos de asistencia lingüística. Llame al [phone].    We comply with applicable federal civil rights laws and [place] laws. We do not discriminate on the basis of race, color, national origin, age, disability, sex, sexual orientation, or gender identity.            Thank you!     Thank you for choosing [facility]  for your care. Our goal is always to provide you with excellent care. Hearing back from our patients is one way we can continue to improve our services. Please take a few minutes to complete the written survey that you may receive in the mail after your visit with us. Thank you!             Your Updated Medication List - Protect others around you: Learn how to safely use, store and throw away your medicines at www.disposemymeds.org.          This list is accurate as of: 12/29/17  3:57 PM.  Always use your most recent med list.                   Brand Name Dispense Instructions for use Diagnosis    aspirin 325 MG EC tablet      Take 325 mg by mouth daily.        azithromycin 250 MG tablet    ZITHROMAX    6 tablet    Two tablets first day, then one tablet daily for four days.    Pneumonia of right lower lobe due to infectious organism (H)       calcium carbonate 1250 MG tablet    OS-GENE 500 mg [place]. Ca     Take 1,000 mg by mouth daily        fish Oil 1200 MG capsule      Take 2 capsules by mouth daily        fluocinonide 0.05 % cream    LIDEX    45 g    Apply  topically 2 times daily. Apply to affected area as needed.    Eczema        furosemide 20 MG tablet    LASIX    60 tablet    Take 1 tablet (20 mg) by mouth 2 times daily    Cough       guaiFENesin-codeine 100-10 MG/5ML Soln solution    ROBITUSSIN AC    180 mL    Take 5-10 mLs by mouth every 4 hours as needed for cough    Upper respiratory tract infection, unspecified type       lecithin 1200 MG Caps capsule    lecithin     Take 400 mg by mouth daily        losartan 50 MG tablet    COZAAR    30 tablet    Take 1 tablet (50 mg) by mouth daily    Hypertension goal BP (blood pressure) < 150/90       metoprolol 50 MG tablet    LOPRESSOR    180 tablet    Take 1 tablet (50 mg) by mouth 2 times daily    Hypertension goal BP (blood pressure) < 150/90       simvastatin 10 MG tablet    ZOCOR    90 tablet    Take 1 tablet (10 mg) by mouth At Bedtime    Hyperlipidemia LDL goal <130       VITAMIN C PO      Take 2,000 mg by mouth.        VITAMIN D (CHOLECALCIFEROL) PO      Take 5,000 Units by mouth daily        vitamin E 400 UNITS Tabs      Take 400 Units by mouth daily

## 2017-12-30 RX ORDER — LOSARTAN POTASSIUM 50 MG/1
50 TABLET ORAL DAILY
Qty: 90 TABLET | Refills: 0 | Status: SHIPPED | OUTPATIENT
Start: 2017-12-30 | End: 2018-03-22

## 2018-01-08 ENCOUNTER — ALLIED HEALTH/NURSE VISIT (OUTPATIENT)
Dept: FAMILY MEDICINE | Facility: CLINIC | Age: 73
End: 2018-01-08
Payer: COMMERCIAL

## 2018-01-08 VITALS — HEART RATE: 96 BPM | DIASTOLIC BLOOD PRESSURE: 84 MMHG | SYSTOLIC BLOOD PRESSURE: 152 MMHG

## 2018-01-08 DIAGNOSIS — Z01.30 BP CHECK: Primary | ICD-10-CM

## 2018-01-08 PROCEDURE — 99207 ZZC NO CHARGE NURSE ONLY: CPT | Performed by: FAMILY MEDICINE

## 2018-01-08 NOTE — MR AVS SNAPSHOT
After Visit Summary   1/8/2018    Rm Maldonado    MRN: [id number]           Patient Information     Date Of Birth          1945        Visit Information        Provider Department      1/8/2018 8:56 AM Riddhi Lopez MD [facility]        Today's Diagnoses     BP check    -  1       Follow-ups after your visit        Who to contact     If you have questions or need follow up information about today's clinic visit or your schedule please contact [facility] directly at [phone].  Normal or non-critical lab and imaging results will be communicated to you by MyChart, letter or phone within 4 business days after the clinic has received the results. If you do not hear from us within 7 days, please contact the clinic through Millennium Airshiphart or phone. If you have a critical or abnormal lab result, we will notify you by phone as soon as possible.  Submit refill requests through Exakis or call your pharmacy and they will forward the refill request to us. Please allow 3 business days for your refill to be completed.          Additional Information About Your Visit        MyChart Information     Exakis gives you secure access to your electronic health record. If you see a primary care provider, you can also send messages to your care team and make appointments. If you have questions, please call your primary care clinic.  If you do not have a primary care provider, please call [phone] and they will assist you.        Care EveryWhere ID     This is your Care EveryWhere ID. This could be used by other organizations to access your [place] medical records  [id number]        Your Vitals Were     Pulse                   96            Blood Pressure from Last 3 Encounters:   01/08/18 152/84   12/29/17 138/80   09/27/17 129/81    Weight from Last 3 Encounters:   09/27/17 256 lb 3.2 oz (116.2 kg)   09/21/17 256 lb (116.1 kg)   06/09/17 256 lb (116.1 kg)              Today, you  had the following     No orders found for display       Primary Care Provider Office Phone # Fax #    Riddhi Lopez [phone] [address] [facility] [address]        Equal Access to Services     RADHA FELDMAN : Hadii melvin steven katerina Sobassem, waaxda luqadaha, qaybta kaalmada adedeniseda, rodrigo gonzalez nereyda ofster. So [facility] [phone].    ATENCIÓN: Si habla español, tiene a piedra disposición servicios gratuitos de asistencia lingüística. Llame al [phone].    We comply with applicable federal civil rights laws and [place] laws. We do not discriminate on the basis of race, color, national origin, age, disability, sex, sexual orientation, or gender identity.            Thank you!     Thank you for choosing [facility]  for your care. Our goal is always to provide you with excellent care. Hearing back from our patients is one way we can continue to improve our services. Please take a few minutes to complete the written survey that you may receive in the mail after your visit with us. Thank you!             Your Updated Medication List - Protect others around you: Learn how to safely use, store and throw away your medicines at www.disposemymeds.org.          This list is accurate as of: 1/8/18  8:58 AM.  Always use your most recent med list.                   Brand Name Dispense Instructions for use Diagnosis    aspirin 325 MG EC tablet      Take 325 mg by mouth daily.        azithromycin 250 MG tablet    ZITHROMAX    6 tablet    Two tablets first day, then one tablet daily for four days.    Pneumonia of right lower lobe due to infectious organism (H)       calcium carbonate 1250 MG tablet    OS-GENE 500 mg [place]. Ca     Take 1,000 mg by mouth daily        fish Oil 1200 MG capsule      Take 2 capsules by mouth daily        fluocinonide 0.05 % cream    LIDEX    45 g    Apply  topically 2 times daily. Apply to affected area as needed.    Eczema        furosemide 20 MG tablet    LASIX    60 tablet    Take 1 tablet (20 mg) by mouth 2 times daily    Cough       guaiFENesin-codeine 100-10 MG/5ML Soln solution    ROBITUSSIN AC    180 mL    Take 5-10 mLs by mouth every 4 hours as needed for cough    Upper respiratory tract infection, unspecified type       lecithin 1200 MG Caps capsule    lecithin     Take 400 mg by mouth daily        losartan 50 MG tablet    COZAAR    90 tablet    Take 1 tablet (50 mg) by mouth daily    Hypertension goal BP (blood pressure) < 150/90       metoprolol 50 MG tablet    LOPRESSOR    180 tablet    Take 1 tablet (50 mg) by mouth 2 times daily    Hypertension goal BP (blood pressure) < 150/90       simvastatin 10 MG tablet    ZOCOR    90 tablet    Take 1 tablet (10 mg) by mouth At Bedtime    Hyperlipidemia LDL goal <130       VITAMIN C PO      Take 2,000 mg by mouth.        VITAMIN D (CHOLECALCIFEROL) PO      Take 5,000 Units by mouth daily        vitamin E 400 UNITS Tabs      Take 400 Units by mouth daily

## 2018-01-08 NOTE — PROGRESS NOTES
Rm Maldonado is enrolled/participating in the retail pharmacy Blood Pressure Goals Achievement Program (BPGAP).  Rm Maldonado was evaluated at [facility] on January 8, 2018 at which time his blood pressure was:    BP Readings from Last 3 Encounters:   01/08/18 152/84   12/29/17 138/80   09/27/17 129/81     Reviewed lifestyle modifications for blood pressure control and reduction: including making healthy food choices, managing weight, getting regular exercise, smoking cessation, reducing alcohol consumption, monitoring blood pressure regularly.     Rm Maldonado is not experiencing symptoms.    Follow-Up: BP is not at goal of < 150/90 mmHg (patient 60+ years of age without diabetes), Recommended follow-up with PCP.  Routing to PCP for further review.    Recommendation to Provider: Increase Losartan dose    Rm Maldonado was evaluated for enrollment into the PGEN study today.    Patient eligible for enrollment:  No  Patient interested in enrollment:  No    Completed by: Victor M Steiner RPh.  [facility]  [phone]

## 2018-01-08 NOTE — Clinical Note
Routing message to PCP for review -BP checked at pharmacy and noted to be above goal. Recommended patient follow-up with PCP. Victor M Steiner RPh. [facility] [phone]

## 2018-01-10 NOTE — PROGRESS NOTES
Patient/parent is informed of MD note below, as it is written. Verbalized good understanding.  Patient wanted Dr Riddhi Lopez to be aware will be visiting [place] for ~ 2 + mos.  Patient did purchase a new blood pressure cuff and has decreased anxiety in life.  Will come to clinic prior to leaving state.  The patient/parent agrees with the plan and verbalized good understanding.    Samia Hernandez RN

## 2018-01-10 NOTE — PROGRESS NOTES
Just increased dose 1 month ago.  Plan to recheck in 3-4 weeks in pharmacy, if not to goal by then will increase dose of losartan again.

## 2018-01-12 ENCOUNTER — ALLIED HEALTH/NURSE VISIT (OUTPATIENT)
Dept: FAMILY MEDICINE | Facility: CLINIC | Age: 73
End: 2018-01-12
Payer: COMMERCIAL

## 2018-01-12 VITALS — DIASTOLIC BLOOD PRESSURE: 78 MMHG | SYSTOLIC BLOOD PRESSURE: 124 MMHG

## 2018-01-12 DIAGNOSIS — I10 ESSENTIAL (PRIMARY) HYPERTENSION: Primary | ICD-10-CM

## 2018-01-12 DIAGNOSIS — I10 HYPERTENSION GOAL BP (BLOOD PRESSURE) < 150/90: ICD-10-CM

## 2018-01-12 PROCEDURE — 99207 ZZC NO CHARGE NURSE ONLY: CPT | Performed by: FAMILY MEDICINE

## 2018-01-12 NOTE — PROGRESS NOTES
Rm Maldonado is enrolled/participating in the retail pharmacy Blood Pressure Goals Achievement Program (BPGAP).  Rm Maldonado was evaluated at [facility] on January 12, 2018 at which time his blood pressure was:    BP Readings from Last 3 Encounters:   01/12/18 124/78   01/08/18 152/84   12/29/17 138/80     Reviewed lifestyle modifications for blood pressure control and reduction: including making healthy food choices, managing weight, getting regular exercise, smoking cessation, reducing alcohol consumption, monitoring blood pressure regularly.     Rm Maldonado is not experiencing symptoms.    Follow-Up: BP is at goal of < 140/90mmHg (patient 18+ years of age with or without diabetes).  Recommended follow-up at pharmacy in 6 months.     Recommendation to Provider: .    Rm Maldonado was evaluated for enrollment into the PGEN study today.    Patient eligible for enrollment:  Unknown  Patient interested in enrollment:  Unknown    Completed by: Blossom Jay [facility] [place] [facility]    [phone]

## 2018-01-12 NOTE — MR AVS SNAPSHOT
After Visit Summary   1/12/2018    Rm Maldonado    MRN: [id number]           Patient Information     Date Of Birth          1945        Visit Information        Provider Department      1/12/2018 5:51 PM Riddhi Lopez MD [facility]        Today's Diagnoses     Essential (primary) hypertension    -  1    Hypertension goal BP (blood pressure) < 150/90           Follow-ups after your visit        Who to contact     If you have questions or need follow up information about today's clinic visit or your schedule please contact [facility] directly at [phone].  Normal or non-critical lab and imaging results will be communicated to you by KargoCardhart, letter or phone within 4 business days after the clinic has received the results. If you do not hear from us within 7 days, please contact the clinic through Limitlesslanet or phone. If you have a critical or abnormal lab result, we will notify you by phone as soon as possible.  Submit refill requests through Sensorion or call your pharmacy and they will forward the refill request to us. Please allow 3 business days for your refill to be completed.          Additional Information About Your Visit        MyChart Information     Sensorion gives you secure access to your electronic health record. If you see a primary care provider, you can also send messages to your care team and make appointments. If you have questions, please call your primary care clinic.  If you do not have a primary care provider, please call [phone] and they will assist you.        Care EveryWhere ID     This is your Care EveryWhere ID. This could be used by other organizations to access your [place] medical records  [id number]         Blood Pressure from Last 3 Encounters:   01/12/18 124/78   01/08/18 152/84   12/29/17 138/80    Weight from Last 3 Encounters:   09/27/17 256 lb 3.2 oz (116.2 kg)   09/21/17 256 lb (116.1 kg)   06/09/17 256 lb (116.1 kg)               Today, you had the following     No orders found for display       Primary Care Provider Office Phone # Fax #    Riddhi Lopez [phone] [address] [facility] [address]        Equal Access to Services     RADHA FELDMAN : Hadii melvin harris talato Soomaali, waaxda luqadaha, qaybta kaalmada adedeniseda, rodrigo wuannemarie davisnaina gonzalez nereyda foster. So [facility] [phone].    ATENCIÓN: Si habla español, tiene a piedra disposición servicios gratuitos de asistencia lingüística. [facility] [phone].    We comply with applicable federal civil rights laws and [place] laws. We do not discriminate on the basis of race, color, national origin, age, disability, sex, sexual orientation, or gender identity.            Thank you!     Thank you for choosing [facility]  for your care. Our goal is always to provide you with excellent care. Hearing back from our patients is one way we can continue to improve our services. Please take a few minutes to complete the written survey that you may receive in the mail after your visit with us. Thank you!             Your Updated Medication List - Protect others around you: Learn how to safely use, store and throw away your medicines at www.disposemymeds.org.          This list is accurate as of: 1/12/18  5:56 PM.  Always use your most recent med list.                   Brand Name Dispense Instructions for use Diagnosis    aspirin 325 MG EC tablet      Take 325 mg by mouth daily.        calcium carbonate 1250 MG tablet    OS-GENE 500 mg [place]. Ca     Take 1,000 mg by mouth daily        fish Oil 1200 MG capsule      Take 2 capsules by mouth daily        fluocinonide 0.05 % cream    LIDEX    45 g    Apply  topically 2 times daily. Apply to affected area as needed.    Eczema       furosemide 20 MG tablet    LASIX    60 tablet    Take 1 tablet (20 mg) by mouth 2 times daily    Cough       guaiFENesin-codeine 100-10 MG/5ML Soln solution     ROBITUSSIN AC    180 mL    Take 5-10 mLs by mouth every 4 hours as needed for cough    Upper respiratory tract infection, unspecified type       lecithin 1200 MG Caps capsule    lecithin     Take 400 mg by mouth daily        losartan 50 MG tablet    COZAAR    90 tablet    Take 1 tablet (50 mg) by mouth daily    Hypertension goal BP (blood pressure) < 150/90       metoprolol tartrate 50 MG tablet    LOPRESSOR    180 tablet    Take 1 tablet (50 mg) by mouth 2 times daily    Hypertension goal BP (blood pressure) < 150/90       simvastatin 10 MG tablet    ZOCOR    90 tablet    Take 1 tablet (10 mg) by mouth At Bedtime    Hyperlipidemia LDL goal <130       VITAMIN C PO      Take 2,000 mg by mouth.        VITAMIN D (CHOLECALCIFEROL) PO      Take 5,000 Units by mouth daily        vitamin E 400 UNITS Tabs      Take 400 Units by mouth daily

## 2018-03-20 ENCOUNTER — DOCUMENTATION ONLY (OUTPATIENT)
Dept: LAB | Facility: CLINIC | Age: 73
End: 2018-03-20

## 2018-03-20 DIAGNOSIS — I10 HYPERTENSION GOAL BP (BLOOD PRESSURE) < 150/90: Primary | ICD-10-CM

## 2018-03-20 NOTE — PROGRESS NOTES
This patient has a lab only appointment on 3/21/2018 (tomorrow) but does not have future orders. Please review, associate diagnosis and sign pending lab orders for the upcoming appointment.  There are no future scheduled appointments with Dr. Lopez at this time.      Thank you,    [facility]

## 2018-03-21 DIAGNOSIS — I10 HYPERTENSION GOAL BP (BLOOD PRESSURE) < 150/90: ICD-10-CM

## 2018-03-21 LAB
ANION GAP SERPL CALCULATED.3IONS-SCNC: 9 MMOL/L (ref 3–14)
BUN SERPL-MCNC: 19 MG/DL (ref 7–30)
CALCIUM SERPL-MCNC: 9.3 MG/DL (ref 8.5–10.1)
CHLORIDE SERPL-SCNC: 102 MMOL/L (ref 94–109)
CO2 SERPL-SCNC: 30 MMOL/L (ref 20–32)
CREAT SERPL-MCNC: 0.92 MG/DL (ref 0.66–1.25)
GFR SERPL CREATININE-BSD FRML MDRD: 81 ML/MIN/1.7M2
GLUCOSE SERPL-MCNC: 92 MG/DL (ref 70–99)
POTASSIUM SERPL-SCNC: 4.3 MMOL/L (ref 3.4–5.3)
SODIUM SERPL-SCNC: 141 MMOL/L (ref 133–144)

## 2018-03-21 PROCEDURE — 36415 COLL VENOUS BLD VENIPUNCTURE: CPT | Performed by: FAMILY MEDICINE

## 2018-03-21 PROCEDURE — 80048 BASIC METABOLIC PNL TOTAL CA: CPT | Performed by: FAMILY MEDICINE

## 2018-03-21 NOTE — PROGRESS NOTES
Patient already drawn this morning, please sign pended labs and add any labs that patient may need. Then close encounter    Radha Mcnulty,

## 2018-03-22 RX ORDER — LOSARTAN POTASSIUM 50 MG/1
50 TABLET ORAL DAILY
Qty: 90 TABLET | Refills: 0 | Status: SHIPPED | OUTPATIENT
Start: 2018-03-22 | End: 2018-06-19

## 2018-03-22 RX ORDER — METOPROLOL TARTRATE 50 MG
50 TABLET ORAL 2 TIMES DAILY
Qty: 180 TABLET | Refills: 0 | Status: SHIPPED | OUTPATIENT
Start: 2018-03-22 | End: 2018-06-19

## 2018-06-04 NOTE — PROGRESS NOTES
HPI:    Rm is a 72 year old male here to discuss:    Edema along with stasis dermatitis - this is on both legs below the knees, most pronounced around the ankles. Present since 2014. He has baseline exertional shortness of breath which has not changed recently. There is no cough or fatigue. No chest pain, PND, orthopnea. He has gained some weight. No sleep apnea symptoms like snoring, witnessed apnea, day time somnolence. BMI is elevated. No know history of varicose veins. No use of medications like dihydropyridine calcium channel blockers.   Evaluation and treatment:    The differential dx for edema includes medication side effects, CHF, sleep apnea, renal, thyroid or hepatic dysfunction, varicose veins, incompetent veins, obesity.  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   He says Lasix 20 mg bid has not helped much   On 3/21/18 BMP normal.   On 9/27/17 BNP normal.   The patient is advised to lose weight via diet and exercise. Also avoid excessive salt.    I have also advised nutrition referral and compression stockings.    If these measures are not effective a diuretic may be appropriate.       Wt Readings from Last 5 Encounters:   06/06/18 263 lb (119.3 kg)   09/27/17 256 lb 3.2 oz (116.2 kg)   09/21/17 256 lb (116.1 kg)   06/09/17 256 lb (116.1 kg)   11/14/16 248 lb (112.5 kg)     HTN - not checking at home. Controlled in clinic.  Evaluation and treatment:    Lisinopril stopped previously due to cough.   Losartan 50 mg qd - no side effects.    Last Basic Metabolic Panel:  Lab Results   Component Value Date     03/21/2018      Lab Results   Component Value Date    POTASSIUM 4.3 03/21/2018     Lab Results   Component Value Date    CHLORIDE 102 03/21/2018     Lab Results   Component Value Date    GENE 9.3 03/21/2018     Lab Results   Component Value Date    CO2 30 03/21/2018     Lab Results   Component Value Date    BUN 19 03/21/2018     Lab Results   Component Value  "Date    CR 0.92 03/21/2018     Lab Results   Component Value Date    GLC 92 03/21/2018       Paralyzed left hemidiaphragm - asymptomatic.    Dyslipidemia - not discussed today.     ROS:   Const: No weight changes.   ENT: No sore throat or ear pain.   Resp: No shortness of breath.   CV: No chest pain, dizziness or cardiac palpitations.   GI: No nausea, vomiting, diarrhea or constipation. Denies blood in stools or black stools.   : No dysuria, frequency or hematuria.     SH:     . Three kids. Works as . Non smoker. Etoh occ. Caffeine 5/day.    Exam:    /80 (Cuff Size: Adult Large)  Pulse 95  Temp 99  F (37.2  C) (Oral)  Resp 16  Ht 5' 10.5\" (1.791 m)  Wt 263 lb (119.3 kg)  SpO2 94%  BMI 37.2 kg/m2      Lungs:   CV: Heart RRR with no murmurs. No JVD, carotid bruits or leg edema.    Exam:    /80 (Cuff Size: Adult Large)  Pulse 95  Temp 99  F (37.2  C) (Oral)  Resp 16  Ht 5' 10.5\" (1.791 m)  Wt 263 lb (119.3 kg)  SpO2 94%  BMI 37.2 kg/m2    Gen: Elderly male in no acute distress  Eyes: Conjunctiva and sclera normal. Pupils react normally to light. No nystagmus.  Neck: No enlarged lymph nodes, thyromegally or other masses.  Lungs: Left lower lung field with almost no air movement. This is baseline for him. Otherwise clear to ascultation.   CV: Heart RRR with no murmurs. No JVD. Both legs with 2+ edema from mid legs to feet. The left shin has dermatitis.      Assessment and Plan - Decision Making    1. Bilateral leg edema  Er HPI  - PHYSICAL THERAPY REFERRAL  - LYMPHEDEMA THERAPY REFERRAL    2. Venous stasis dermatitis of left lower extremity  Per HPI  - LYMPHEDEMA THERAPY REFERRAL    3. Exogenous obesity  Per HPI  - NUTRITION REFERRAL      Written instructions given as follows:    Patient Instructions     1. I recommend including veggies, fresh fruits (3 to 5 servings), nuts (unsalted) in your daily diet. Cut down on carbs like bread, pasta, rice, potatoes. Cut down " on red meats like burgers etc. Increase healthy proteins like beans, tofu, tuna, chicken and turkey.    2. Get regular exercise like walking daily. Then build up to biking, swimming. At least 15-30 minutes each time at least 3 times per week.     3. Please call [phone] to set up the appointment with the nutritionist. You might want to contact your insurance to make sure the would pay for it.    4. Set up lymphedema drainage - [phone].    5. Wear compression stockings during the day.    6. See Dr. Lopez in 4 weeks - weight goal is: 258#.    Wt Readings from Last 5 Encounters:   06/06/18 263 lb (119.3 kg)   09/27/17 256 lb 3.2 oz (116.2 kg)   09/21/17 256 lb (116.1 kg)   06/09/17 256 lb (116.1 kg)   11/14/16 248 lb (112.5 kg)

## 2018-06-06 ENCOUNTER — OFFICE VISIT (OUTPATIENT)
Dept: FAMILY MEDICINE | Facility: CLINIC | Age: 73
End: 2018-06-06
Payer: COMMERCIAL

## 2018-06-06 VITALS
BODY MASS INDEX: 36.82 KG/M2 | RESPIRATION RATE: 16 BRPM | HEART RATE: 95 BPM | DIASTOLIC BLOOD PRESSURE: 80 MMHG | OXYGEN SATURATION: 94 % | SYSTOLIC BLOOD PRESSURE: 130 MMHG | WEIGHT: 263 LBS | TEMPERATURE: 99 F | HEIGHT: 71 IN

## 2018-06-06 DIAGNOSIS — I87.2 VENOUS STASIS DERMATITIS OF LEFT LOWER EXTREMITY: ICD-10-CM

## 2018-06-06 DIAGNOSIS — R60.0 BILATERAL LEG EDEMA: Primary | ICD-10-CM

## 2018-06-06 DIAGNOSIS — E66.09 EXOGENOUS OBESITY: ICD-10-CM

## 2018-06-06 PROCEDURE — 99214 OFFICE O/P EST MOD 30 MIN: CPT | Performed by: FAMILY MEDICINE

## 2018-06-06 NOTE — NURSING NOTE
"Chief Complaint   Patient presents with     Edema     Health Maintenance     prevnar, fall risk assessment, advance care directive planning       Initial /90 (Cuff Size: Adult Large)  Pulse 95  Temp 99  F (37.2  C) (Oral)  Resp 16  Ht 5' 10.5\" (1.791 m)  Wt 263 lb (119.3 kg)  SpO2 94%  BMI 37.2 kg/m2 Estimated body mass index is 37.2 kg/(m^2) as calculated from the following:    Height as of this encounter: 5' 10.5\" (1.791 m).    Weight as of this encounter: 263 lb (119.3 kg).      Mary Bazan LPN    "

## 2018-06-06 NOTE — MR AVS SNAPSHOT
After Visit Summary   6/6/2018    Rm Maldonado    MRN: [id number]           Patient Information     Date Of Birth          1945        Visit Information        Provider Department      6/6/2018 1:30 PM Jatinder Giraldo MD [facility]        Today's Diagnoses     Bilateral leg edema    -  1    Venous stasis dermatitis of left lower extremity        Exogenous obesity          Care Instructions    1. I recommend including veggies, fresh fruits (3 to 5 servings), nuts (unsalted) in your daily diet. Cut down on carbs like bread, pasta, rice, potatoes. Cut down on red meats like burgers etc. Increase healthy proteins like beans, tofu, tuna, chicken and turkey.    2. Get regular exercise like walking daily. Then build up to biking, swimming. At least 15-30 minutes each time at least 3 times per week.     3. Please call [phone] to set up the appointment with the nutritionist. You might want to contact your insurance to make sure the would pay for it.    4. Set up lymphedema drainage - [phone].    5. Wear compression stockings during the day.    6. See Dr. Lopez in 4 weeks - weight goal is: 258#.    Wt Readings from Last 5 Encounters:   06/06/18 263 lb (119.3 kg)   09/27/17 256 lb 3.2 oz (116.2 kg)   09/21/17 256 lb (116.1 kg)   06/09/17 256 lb (116.1 kg)   11/14/16 248 lb (112.5 kg)               Follow-ups after your visit        Additional Services     NUTRITION REFERRAL       Your provider has referred you to: FRIEDA: Orion Sudarshan Heaton [phone]   [email or web address]    Please be aware that coverage of these services is subject to the terms and limitations of your health insurance plan.  Call member services at your health plan with any benefit or coverage questions.      Please bring the following to your appointment:      >>   This referral request   >>   Any documents given to you for this referral  >>   Any specific questions you  have about diet or food choices            PHYSICAL THERAPY REFERRAL       *This order will print in the [facility] Central Scheduling Office*    [facility] provides Physical Therapy evaluation and treatment and many specialty services across the [place] system.  If requesting a specialty program, please choose from the list below.    Call one number to schedule at any [facility] location   [phone].    Treatment: Evaluation & Treatment  Special Instructions/Modalities: none  Special Programs: [facility]    Please be aware that coverage of these services is subject to the terms and limitations of your health insurance plan.  Call member services at your health plan with any benefit or coverage questions.      **Note to Provider** To refer patients to therapy outside of the location list, change the order class to External Referral in the order composer.                  Your next 10 appointments already scheduled     Jun 19, 2018 10:15 AM CDT   PHYSICAL with Riddhi Lopez MD   [facility]    [address] [address] [phone]              Who to contact     If you have questions or need follow up information about today's clinic visit or your schedule please contact [facility] directly at [phone].  Normal or non-critical lab and imaging results will be communicated to you by MyChart, letter or phone within 4 business days after the clinic has received the results. If you do not hear from us within 7 days, please contact the clinic through MyChart or phone. If you have a critical or abnormal lab result, we will notify you by phone as soon as possible.  Submit refill requests through Bilna or call your pharmacy and they will forward the refill request to us. Please allow 3 business days for your refill to be completed.          Additional  "Information About Your Visit        MyChart Information     [employer] gives you secure access to your electronic health record. If you see a primary care provider, you can also send messages to your care team and make appointments. If you have questions, please call your primary care clinic.  If you do not have a primary care provider, please call [phone] and they will assist you.        Care EveryWhere ID     This is your Care EveryWhere ID. This could be used by other organizations to access your [place] medical records  [id number]        Your Vitals Were     Pulse Temperature Respirations Height Pulse Oximetry BMI (Body Mass Index)    95 99  F (37.2  C) (Oral) 16 5' 10.5\" (1.791 m) 94% 37.2 kg/m2       Blood Pressure from Last 3 Encounters:   06/06/18 130/80   01/12/18 124/78   01/08/18 152/84    Weight from Last 3 Encounters:   06/06/18 263 lb (119.3 kg)   09/27/17 256 lb 3.2 oz (116.2 kg)   09/21/17 256 lb (116.1 kg)              We Performed the Following     NUTRITION REFERRAL     PHYSICAL THERAPY REFERRAL        Primary Care Provider Office Phone # Fax #    Riddhi Jenny Lopez [phone] [address] [address] [address]        Equal Access to Services     RADHA FELDMAN : Hadii melvin ku hadasho Soomaali, waaxda luqadaha, qaybta kaalmada adeegyada, waxay idiin hayblairn kendy dawn . So [facility] [phone].    ATENCIÓN: Si habla español, tiene a piedra disposición servicios gratuitos de asistencia lingüística. Llame al [phone].    We comply with applicable federal civil rights laws and [place] laws. We do not discriminate on the basis of race, color, national origin, age, disability, sex, sexual orientation, or gender identity.            Thank you!     Thank you for choosing [facility]  for your care. Our goal is always to provide you with excellent care. Hearing back from our patients is one way we can continue to improve our services. Please take a few " minutes to complete the written survey that you may receive in the mail after your visit with us. Thank you!             Your Updated Medication List - Protect others around you: Learn how to safely use, store and throw away your medicines at www.disposemymeds.org.          This list is accurate as of 6/6/18  2:09 PM.  Always use your most recent med list.                   Brand Name Dispense Instructions for use Diagnosis    aspirin 325 MG EC tablet      Take 325 mg by mouth daily.        calcium carbonate 500 MG tablet    OS-GENE 500 mg [place]. Ca     Take 1,000 mg by mouth daily        fish Oil 1200 MG capsule      Take 2 capsules by mouth daily        lecithin 1200 MG Caps capsule    lecithin     Take 400 mg by mouth daily        losartan 50 MG tablet    COZAAR    90 tablet    Take 1 tablet (50 mg) by mouth daily    Hypertension goal BP (blood pressure) < 150/90       metoprolol tartrate 50 MG tablet    LOPRESSOR    180 tablet    Take 1 tablet (50 mg) by mouth 2 times daily    Hypertension goal BP (blood pressure) < 150/90       simvastatin 10 MG tablet    ZOCOR    90 tablet    Take 1 tablet (10 mg) by mouth At Bedtime    Hyperlipidemia LDL goal <130       VITAMIN C PO      Take 2,000 mg by mouth.        VITAMIN D (CHOLECALCIFEROL) PO      Take 5,000 Units by mouth daily        vitamin E 400 units Tabs      Take 400 Units by mouth daily

## 2018-06-06 NOTE — PATIENT INSTRUCTIONS
1. I recommend including veggies, fresh fruits (3 to 5 servings), nuts (unsalted) in your daily diet. Cut down on carbs like bread, pasta, rice, potatoes. Cut down on red meats like burgers etc. Increase healthy proteins like beans, tofu, tuna, chicken and turkey.    2. Get regular exercise like walking daily. Then build up to biking, swimming. At least 15-30 minutes each time at least 3 times per week.     3. Please call [phone] to set up the appointment with the nutritionist. You might want to contact your insurance to make sure the would pay for it.    4. Set up lymphedema drainage - [phone].    5. Wear compression stockings during the day.    6. See Dr. Lopez in 4 weeks - weight goal is: 258#.    Wt Readings from Last 5 Encounters:   06/06/18 263 lb (119.3 kg)   09/27/17 256 lb 3.2 oz (116.2 kg)   09/21/17 256 lb (116.1 kg)   06/09/17 256 lb (116.1 kg)   11/14/16 248 lb (112.5 kg)

## 2018-06-13 NOTE — PATIENT INSTRUCTIONS
Preventive Health Recommendations:       Male Ages 65 and over    Yearly exam:             See your health care provider every year in order to  o   Review health changes.   o   Discuss preventive care.    o   Review your medicines if your doctor has prescribed any.    Talk with your health care provider about whether you should have a test to screen for prostate cancer (PSA).    Every 3 years, have a diabetes test (fasting glucose). If you are at risk for diabetes, you should have this test more often.    Every 5 years, have a cholesterol test. Have this test more often if you are at risk for high cholesterol or heart disease.     Every 10 years, have a colonoscopy. Or, have a yearly FIT test (stool test). These exams will check for colon cancer.    Talk to with your health care provider about screening for Abdominal Aortic Aneurysm if you have a family history of AAA or have a history of smoking.  Shots:     Get a flu shot each year.     Get a tetanus shot every 10 years.     Talk to your doctor about your pneumonia vaccines. There are now two you should receive - Pneumovax (PPSV 23) and Prevnar (PCV 13).    Talk to your doctor about a shingles vaccine.     Talk to your doctor about the hepatitis B vaccine.  Nutrition:     Eat at least 5 servings of fruits and vegetables each day.     Eat whole-grain bread, whole-wheat pasta and brown rice instead of white grains and rice.     Talk to your doctor about Calcium and Vitamin D.   Lifestyle    Exercise for at least 150 minutes a week (30 minutes a day, 5 days a week). This will help you control your weight and prevent disease.     Limit alcohol to one drink per day.     No smoking.     Wear sunscreen to prevent skin cancer.     See your dentist every six months for an exam and cleaning.     See your eye doctor every 1 to 2 years to screen for conditions such as glaucoma, macular degeneration and cataracts.to

## 2018-06-13 NOTE — PROGRESS NOTES
"    SUBJECTIVE:   Rm Maldonado is a 72 year old male who presents for Preventive Visit.  {PVP to remind patient that this is not necessarily a physical exam; physical exam may or may not be done:137391::\"click delete button to remove this line now\"}  {PVP to inform patient that additional E&M charge may apply, if additional problems addressed:867035::\"click delete button to remove this line now\"}  Are you in the first 12 months of your Medicare Part B coverage?  {No Yes:240971::\"No\"}    Healthy Habits:    Do you get at least three servings of calcium containing foods daily (dairy, green leafy vegetables, etc.)? {YES/NO, DAIRY INTAKE:224637::\"yes\"}    Amount of exercise or daily activities, outside of work: {AMOUNT EXERCISE:497084}    Problems taking medications regularly {Yes /No default:373281::\"No\"}    Medication side effects: {Yes /No default.:683471::\"No\"}    Have you had an eye exam in the past two years? {YESNOBLANK:799978}    Do you see a dentist twice per year? {YESNOBLANK:233466}    Do you have sleep apnea, excessive snoring or daytime drowsiness?{YESNOBLANK:256175}      Ability to successfully perform activities of daily living: {YES/NO (MEDICARE):025604::\"Yes, no assistance needed\"}    Home safety:  {IPPE SAFETY CONCERNS:742261::\"none identified\"}     Hearing impairment: {NO/YES:717584}    Fall risk:  {Document Fall Risk in the Assessments Section of the Navigator:286792}    {If any of the above assessments are answered yes, consider ordering appropriate referrals (Optional):459614::\"click delete button to remove this line now\"}    {AWV Cognitive Screenin}    {Outside tests to abstract? :523992}    {additional problems to add (Optional):128468}    Reviewed and updated as needed this visit by clinical staff         Reviewed and updated as needed this visit by Provider        Social History   Substance Use Topics     Smoking status: Former Smoker     Packs/day: 1.00     Years: 10.00     Types: " "Cigarettes, Pipe     Start date: 5/15/1968     Quit date: 8/19/1983     Smokeless tobacco: Never Used     Alcohol use Yes      Comment: Minimal to light       If you drink alcohol do you typically have >3 drinks per day or >7 drinks per week? {ETOH :220735}                        Today's PHQ-2 Score:   PHQ-2 ( 1999 Pfizer) 6/6/2018 11/14/2016   Q1: Little interest or pleasure in doing things 0 0   Q2: Feeling down, depressed or hopeless 0 0   PHQ-2 Score 0 0   Q1: Little interest or pleasure in doing things - -   Q2: Feeling down, depressed or hopeless - -   PHQ-2 Score - -     {PHQ-2 LOOK IN ASSESSMENTS (Optional) :228695}  Do you feel safe in your environment - {YES/NO/NA:514477}    Do you have a Health Care Directive?: {HEALTHCARE DIRECTIVE STATUS:289083}    Current providers sharing in care for this patient include:   Patient Care Team:  Riddhi Lopez MD as PCP - General (Family Practice)    The following health maintenance items are reviewed in Epic and correct as of today:  Health Maintenance   Topic Date Due     PNEUMOCOCCAL (1 of 2 - PCV13) 08/18/2011     INFLUENZA VACCINE (Season Ended) 09/01/2018     LIPID MONITORING Q1 YEAR  09/20/2018     BMP Q6 MOS  09/21/2018     FALL RISK ASSESSMENT  06/06/2019     COLONOSCOPY Q3 YR  08/18/2019     TETANUS IMMUNIZATION (SYSTEM ASSIGNED)  09/15/2020     ADVANCE DIRECTIVE PLANNING Q5 YRS  06/06/2023     AORTIC ANEURYSM SCREENING (SYSTEM ASSIGNED)  Completed     HEPATITIS C SCREENING  Completed     {Chronicprobdata (Optional):294682}    {Decision Support (Optional):416731}    ROS:  {ROS COMP:251434}    OBJECTIVE:   There were no vitals taken for this visit. Estimated body mass index is 37.2 kg/(m^2) as calculated from the following:    Height as of 6/6/18: 5' 10.5\" (1.791 m).    Weight as of 6/6/18: 263 lb (119.3 kg).  EXAM:   {Exam :595961}    ASSESSMENT / PLAN:   {Diag Picklist:066831}    End of Life Planning:  Patient currently has an advanced directive: { " ":454287}    COUNSELING:  {Medicare Counselin}    {BP Counseling- Complete if BP >= 120/80  (Optional):505495}    Estimated body mass index is 37.2 kg/(m^2) as calculated from the following:    Height as of 18: 5' 10.5\" (1.791 m).    Weight as of 18: 263 lb (119.3 kg).  {Weight Management Plan -- Complete if patient has an abnormal BMI (Optional):189467}     reports that he quit smoking about 34 years ago. His smoking use included Cigarettes and Pipe. He started smoking about 50 years ago. He has a 10.00 pack-year smoking history. He has never used smokeless tobacco.  {Tobacco Cessation -- Complete if patient is a smoker (Optional):790351}    Appropriate preventive services were discussed with this patient, including applicable screening as appropriate for cardiovascular disease, diabetes, osteopenia/osteoporosis, and glaucoma.  As appropriate for age/gender, discussed screening for colorectal cancer, prostate cancer, breast cancer, and cervical cancer. Checklist reviewing preventive services available has been given to the patient.    Reviewed patients plan of care and provided an AVS. The {CarePlan:284857} for Rm meets the Care Plan requirement. This Care Plan has been established and reviewed with the {PATIENT, FAMILY MEMBER, CAREGIVER:073860}.    Counseling Resources:  ATP IV Guidelines  Pooled Cohorts Equation Calculator  Breast Cancer Risk Calculator  FRAX Risk Assessment  ICSI Preventive Guidelines  Dietary Guidelines for Americans,   USDA's MyPlate  ASA Prophylaxis  Lung CA Screening    Riddhi Lopez MD  [facility]  "

## 2018-06-19 ENCOUNTER — OFFICE VISIT (OUTPATIENT)
Dept: FAMILY MEDICINE | Facility: CLINIC | Age: 73
End: 2018-06-19
Payer: COMMERCIAL

## 2018-06-19 VITALS
SYSTOLIC BLOOD PRESSURE: 128 MMHG | WEIGHT: 261.2 LBS | DIASTOLIC BLOOD PRESSURE: 80 MMHG | RESPIRATION RATE: 18 BRPM | HEART RATE: 99 BPM | TEMPERATURE: 97.6 F | BODY MASS INDEX: 36.95 KG/M2

## 2018-06-19 DIAGNOSIS — Z00.00 MEDICARE ANNUAL WELLNESS VISIT, SUBSEQUENT: Primary | ICD-10-CM

## 2018-06-19 DIAGNOSIS — Z12.5 SPECIAL SCREENING FOR MALIGNANT NEOPLASM OF PROSTATE: ICD-10-CM

## 2018-06-19 DIAGNOSIS — E78.5 HYPERLIPIDEMIA LDL GOAL <130: ICD-10-CM

## 2018-06-19 DIAGNOSIS — I10 HYPERTENSION GOAL BP (BLOOD PRESSURE) < 150/90: ICD-10-CM

## 2018-06-19 PROCEDURE — G0439 PPPS, SUBSEQ VISIT: HCPCS | Performed by: FAMILY MEDICINE

## 2018-06-19 RX ORDER — SIMVASTATIN 10 MG
10 TABLET ORAL AT BEDTIME
Qty: 90 TABLET | Refills: 3 | Status: SHIPPED | OUTPATIENT
Start: 2018-06-19 | End: 2019-07-11

## 2018-06-19 RX ORDER — LOSARTAN POTASSIUM 50 MG/1
50 TABLET ORAL DAILY
Qty: 90 TABLET | Refills: 1 | Status: SHIPPED | OUTPATIENT
Start: 2018-06-19 | End: 2019-02-09

## 2018-06-19 RX ORDER — METOPROLOL TARTRATE 50 MG
50 TABLET ORAL 2 TIMES DAILY
Qty: 180 TABLET | Refills: 1 | Status: SHIPPED | OUTPATIENT
Start: 2018-06-19 | End: 2019-02-09

## 2018-06-19 ASSESSMENT — ACTIVITIES OF DAILY LIVING (ADL)
CURRENT_FUNCTION: NO ASSISTANCE NEEDED
I_NEED_ASSISTANCE_FOR_THE_FOLLOWING_DAILY_ACTIVITIES:: NO ASSISTANCE IS NEEDED

## 2018-06-19 NOTE — MR AVS SNAPSHOT
After Visit Summary   6/19/2018    Rm Maldonado    MRN: [id number]           Patient Information     Date Of Birth          1945        Visit Information        Provider Department      6/19/2018 10:15 AM Riddhi Lopez MD [facility]        Today's Diagnoses     Medicare annual wellness visit, subsequent    -  1    Hypertension goal BP (blood pressure) < 150/90        Hyperlipidemia LDL goal <130        Special screening for malignant neoplasm of prostate          Care Instructions      Preventive Health Recommendations:       Male Ages 65 and over    Yearly exam:             See your health care provider every year in order to  o   Review health changes.   o   Discuss preventive care.    o   Review your medicines if your doctor has prescribed any.    Talk with your health care provider about whether you should have a test to screen for prostate cancer (PSA).    Every 3 years, have a diabetes test (fasting glucose). If you are at risk for diabetes, you should have this test more often.    Every 5 years, have a cholesterol test. Have this test more often if you are at risk for high cholesterol or heart disease.     Every 10 years, have a colonoscopy. Or, have a yearly FIT test (stool test). These exams will check for colon cancer.    Talk to with your health care provider about screening for Abdominal Aortic Aneurysm if you have a family history of AAA or have a history of smoking.  Shots:     Get a flu shot each year.     Get a tetanus shot every 10 years.     Talk to your doctor about your pneumonia vaccines. There are now two you should receive - Pneumovax (PPSV 23) and Prevnar (PCV 13).    Talk to your doctor about a shingles vaccine.     Talk to your doctor about the hepatitis B vaccine.  Nutrition:     Eat at least 5 servings of fruits and vegetables each day.     Eat whole-grain bread, whole-wheat pasta and brown rice instead of white grains and rice.     Talk to your  doctor about Calcium and Vitamin D.   Lifestyle    Exercise for at least 150 minutes a week (30 minutes a day, 5 days a week). This will help you control your weight and prevent disease.     Limit alcohol to one drink per day.     No smoking.     Wear sunscreen to prevent skin cancer.     See your dentist every six months for an exam and cleaning.     See your eye doctor every 1 to 2 years to screen for conditions such as glaucoma, macular degeneration and cataracts.          Follow-ups after your visit        Follow-up notes from your care team     Return in about 3 months (around 9/19/2018) for Fasting Lab Work.      Future tests that were ordered for you today     Open Future Orders        Priority Expected Expires Ordered    PSA, screen Routine  6/19/2019 6/19/2018    **Basic metabolic panel FUTURE anytime Routine 6/19/2018 6/19/2019 6/19/2018    Lipid panel reflex to direct LDL Fasting Routine 6/19/2018 6/19/2019 6/19/2018            Who to contact     If you have questions or need follow up information about today's clinic visit or your schedule please contact [facility] directly at [phone].  Normal or non-critical lab and imaging results will be communicated to you by [employer], letter or phone within 4 business days after the clinic has received the results. If you do not hear from us within 7 days, please contact the clinic through [employer]ed [employer] or phone. If you have a critical or abnormal lab result, we will notify you by phone as soon as possible.  Submit refill requests through Andel or call your pharmacy and they will forward the refill request to us. Please allow 3 business days for your refill to be completed.          Additional Information About Your Visit        Andel Information     Andel gives you secure access to your electronic health record. If you see a primary care provider, you can also send messages to your care team and make appointments. If you have questions, please  call your primary care clinic.  If you do not have a primary care provider, please call [phone] and they will assist you.        Care EveryWhere ID     This is your Care EveryWhere ID. This could be used by other organizations to access your [place] medical records  [id number]        Your Vitals Were     Pulse Temperature Respirations BMI (Body Mass Index)          99 97.6  F (36.4  C) (Oral) 18 36.95 kg/m2         Blood Pressure from Last 3 Encounters:   06/19/18 128/80   06/06/18 130/80   01/12/18 124/78    Weight from Last 3 Encounters:   06/19/18 261 lb 3.2 oz (118.5 kg)   06/06/18 263 lb (119.3 kg)   09/27/17 256 lb 3.2 oz (116.2 kg)                 Where to get your medicines      These medications were sent to [facility] - [place], [place] - [address]  [address] [facility] [address]     Phone:  [phone]     losartan 50 MG tablet    metoprolol tartrate 50 MG tablet    simvastatin 10 MG tablet          Primary Care Provider Office Phone # Fax #    Riddhi Lopez [phone] [address] [address] [address]        Equal Access to Services     RADHA FELDMAN : Hadii aad ku hadasho Soomaali, waaxda luqadaha, qaybta kaalmada adeegyada, waxay idiin hayblairn kendy foster. So [facility] [phone].    ATENCIÓN: Si habla español, tiene a piedra disposición servicios gratuitos de asistencia lingüística. phuong al [phone].    We comply with applicable federal civil rights laws and [place] laws. We do not discriminate on the basis of race, color, national origin, age, disability, sex, sexual orientation, or gender identity.            Thank you!     Thank you for choosing [facility]  for your care. Our goal is always to provide you with excellent care. Hearing back from our patients is one way we can continue to improve our services. Please take a few minutes to complete the written survey that you may receive in the mail  after your visit with us. Thank you!             Your Updated Medication List - Protect others around you: Learn how to safely use, store and throw away your medicines at www.disposemymeds.org.          This list is accurate as of 6/19/18 10:50 AM.  Always use your most recent med list.                   Brand Name Dispense Instructions for use Diagnosis    aspirin 325 MG EC tablet      Take 325 mg by mouth daily.        calcium carbonate 500 MG tablet    OS-GENE 500 mg [place]. Ca     Take 1,000 mg by mouth daily        fish Oil 1200 MG capsule      Take 2 capsules by mouth daily        lecithin 1200 MG Caps capsule    lecithin     Take 400 mg by mouth daily        losartan 50 MG tablet    COZAAR    90 tablet    Take 1 tablet (50 mg) by mouth daily    Hypertension goal BP (blood pressure) < 150/90       metoprolol tartrate 50 MG tablet    LOPRESSOR    180 tablet    Take 1 tablet (50 mg) by mouth 2 times daily    Hypertension goal BP (blood pressure) < 150/90       simvastatin 10 MG tablet    ZOCOR    90 tablet    Take 1 tablet (10 mg) by mouth At Bedtime    Hyperlipidemia LDL goal <130       VITAMIN C PO      Take 2,000 mg by mouth.        VITAMIN D (CHOLECALCIFEROL) PO      Take 5,000 Units by mouth daily        vitamin E 400 units Tabs      Take 400 Units by mouth daily

## 2018-06-19 NOTE — NURSING NOTE
"Chief Complaint   Patient presents with     Wellness Visit     Health Maintenance     PCV       Initial /80  Pulse 99  Temp 97.6  F (36.4  C) (Oral)  Resp 18  Wt 261 lb 3.2 oz (118.5 kg)  BMI 36.95 kg/m2 Estimated body mass index is 36.95 kg/(m^2) as calculated from the following:    Height as of 6/6/18: 5' 10.5\" (1.791 m).    Weight as of this encounter: 261 lb 3.2 oz (118.5 kg).  Medication Reconciliation: complete  Felipe Lucas CMA    "

## 2018-06-19 NOTE — PROGRESS NOTES
SUBJECTIVE:   Rm Maldonado is a 72 year old male who presents for Preventive Visit.      Are you in the first 12 months of your Medicare coverage?  No    Physical   Annual:     Getting at least 3 servings of Calcium per day::  NO    Bi-annual eye exam::  Yes    Dental care twice a year::  NO    Sleep apnea or symptoms of sleep apnea::  Daytime drowsiness    Diet::  Low salt, Low fat/cholesterol and Carbohydrate counting    Taking medications regularly::  Yes    Medication side effects::  None    Additional concerns today::  YES    Ability to successfully perform activities of daily living: no assistance needed  Home Safety:  No safety concerns identified  Hearing Impairment: difficulty following a conversation in a noisy restaurant or crowded room, feel that people are mumbling or not speaking clearly and need to ask people to speak up or repeat themselves      Fall risk:  Fallen 2 or more times in the past year?: No  Any fall with injury in the past year?: No    COGNITIVE SCREEN  1) Repeat 3 items (Banana, Sunrise, Chair)    2) Clock draw: NORMAL  3) 3 item recall: Recalls 2 objects   Results: NORMAL clock, 1-2 items recalled: COGNITIVE IMPAIRMENT LESS LIKELY    Mini-CogTM Copyright ANEL Johnson. Licensed by the author for use in [facility]; reprinted with permission (soob@[facility]). All rights reserved.        Reviewed and updated as needed this visit by clinical staff  Tobacco  Allergies  Meds  Problems  Med Hx  Surg Hx  Fam Hx  Soc Hx          Reviewed and updated as needed this visit by Provider  Allergies  Meds  Problems        Social History   Substance Use Topics     Smoking status: Former Smoker     Packs/day: 1.00     Years: 10.00     Types: Cigarettes, Pipe     Start date: 5/15/1968     Quit date: 8/19/1983     Smokeless tobacco: Never Used     Alcohol use Yes      Comment: Minimal to light       Alcohol Use 6/19/2018   If you drink alcohol do you typically have greater than 3 drinks  per day OR greater than 7 drinks per week? No   No flowsheet data found.        Leg swelling is slightly improved with changes since last visit.  Working on stretches and exercises for lymph edema.     Left achilles pain for years, slightly more often with symptoms but not wanting to see specialist yet    Today's PHQ-2 Score:   PHQ-2 ( 1999 [employer]) 6/19/2018   Q1: Little interest or pleasure in doing things 0   Q2: Feeling down, depressed or hopeless 0   PHQ-2 Score 0   Q1: Little interest or pleasure in doing things Not at all   Q2: Feeling down, depressed or hopeless Not at all   PHQ-2 Score 0       Do you feel safe in your environment - Yes    Do you have a Health Care Directive?: Yes: Patient states has Advance Directive and will bring in a copy to clinic.      Family history of prostate cancer: No  Last PSA:   PSA   Date Value Ref Range Status   10/26/2015 2.94 0 - 4 ug/L Final     Doing self testicular exam: YES     Family history of colon cancer:No  Last colonscopy: 8/16 q3 yr     Family history of CAD:Yes mother  Last Cholesterol:   Lab Results   Component Value Date    CHOL 211 09/20/2017     Lab Results   Component Value Date    HDL 60 09/20/2017     Lab Results   Component Value Date     09/20/2017     Lab Results   Component Value Date    TRIG 155 09/20/2017     Lab Results   Component Value Date    CHOLHDLRATIO 3.6 10/26/2015          Immunizations:    Date last DT tdap 9/10,  Date last Pneumovax declined,   Date last Flu declined  Shingrix: recommended      Seat Belt:YES   Sunscreen use: YES  Calcium Intake: adeq   Health Care Directive:NO   Sexually Active:YES      Current contraception: none     Current Concerns: none          Patient Ed:  Reviewed health maintenance including diet, regular exercise, and periodic exams.     The risks, benefits, and treatment options of prescribed medications or other treatments have been discussed with the patient.  The patient should call or schedule a follow  up appt if no improvement or other problems.    Current providers sharing in care for this patient include:   Patient Care Team:  Riddhi Lopez MD as PCP - General (Family Practice)    The following health maintenance items are reviewed in Epic and correct as of today:  Health Maintenance   Topic Date Due     INFLUENZA VACCINE (Season Ended) 09/01/2018     LIPID MONITORING Q1 YEAR  09/20/2018     BMP Q6 MOS  09/21/2018     FALL RISK ASSESSMENT  06/06/2019     PNEUMOCOCCAL (2 of 2 - PPSV23) 06/19/2019     COLONOSCOPY Q3 YR  08/18/2019     TETANUS IMMUNIZATION (SYSTEM ASSIGNED)  09/15/2020     ADVANCE DIRECTIVE PLANNING Q5 YRS  06/06/2023     AORTIC ANEURYSM SCREENING (SYSTEM ASSIGNED)  Completed     HEPATITIS C SCREENING  Completed     Labs reviewed in EPIC  BP Readings from Last 3 Encounters:   06/19/18 128/80   06/06/18 130/80   01/12/18 124/78    Wt Readings from Last 3 Encounters:   06/19/18 261 lb 3.2 oz (118.5 kg)   06/06/18 263 lb (119.3 kg)   09/27/17 256 lb 3.2 oz (116.2 kg)                  Patient Active Problem List   Diagnosis     Exogenous obesity     Hypertension goal BP (blood pressure) < 150/90     Eczema     Advanced directives, counseling/discussion     Vitamin D deficiency     Colon polyp     Paralyzed hemidiaphragm     Hyperlipidemia LDL goal <130     Venous stasis dermatitis of left lower extremity     Bilateral leg edema     Past Surgical History:   Procedure Laterality Date     ABDOMEN SURGERY       BIOPSY       COLONOSCOPY       COLONOSCOPY WITH CO2 INSUFFLATION N/A 8/18/2016    Procedure: COLONOSCOPY WITH CO2 INSUFFLATION;  Surgeon: Ariel Manzanares MD;  Location: [facility] OR     EYE SURGERY      left cataract      HERNIA REPAIR       HERNIA REPAIR       TONSILLECTOMY         Social History   Substance Use Topics     Smoking status: Former Smoker     Packs/day: 1.00     Years: 10.00     Types: Cigarettes, Pipe     Start date: 5/15/1968     Quit date: 8/19/1983     Smokeless tobacco:  "Never Used     Alcohol use Yes      Comment: Minimal to light     Family History   Problem Relation Age of Onset     C.A.D. Mother      HEART DISEASE Mother      Diabetes Mother      Obesity Mother      Alcohol/Drug Father      Obesity Daughter          Current Outpatient Prescriptions   Medication Sig Dispense Refill     Ascorbic Acid (VITAMIN C PO) Take 2,000 mg by mouth.       aspirin 325 MG EC tablet Take 325 mg by mouth daily.       calcium carbonate (OS-GENE 500 MG [place]. [place]) 500 MG tablet Take 1,000 mg by mouth daily       lecithin 1200 MG CAPS Take 400 mg by mouth daily        losartan (COZAAR) 50 MG tablet Take 1 tablet (50 mg) by mouth daily 90 tablet 1     metoprolol tartrate (LOPRESSOR) 50 MG tablet Take 1 tablet (50 mg) by mouth 2 times daily 180 tablet 1     Omega-3 Fatty Acids (FISH OIL) 1200 MG capsule Take 2 capsules by mouth daily        simvastatin (ZOCOR) 10 MG tablet Take 1 tablet (10 mg) by mouth At Bedtime 90 tablet 3     VITAMIN D, CHOLECALCIFEROL, PO Take 5,000 Units by mouth daily       vitamin E 400 UNITS TABS Take 400 Units by mouth daily       [DISCONTINUED] losartan (COZAAR) 50 MG tablet Take 1 tablet (50 mg) by mouth daily 90 tablet 0     [DISCONTINUED] metoprolol tartrate (LOPRESSOR) 50 MG tablet Take 1 tablet (50 mg) by mouth 2 times daily 180 tablet 0     [DISCONTINUED] simvastatin (ZOCOR) 10 MG tablet Take 1 tablet (10 mg) by mouth At Bedtime 90 tablet 3       Pneumonia Vaccine:declined      Review of Systems  Constitutional, HEENT, cardiovascular, pulmonary, gi and gu systems are negative, except as otherwise noted.    OBJECTIVE:   /80  Pulse 99  Temp 97.6  F (36.4  C) (Oral)  Resp 18  Wt 261 lb 3.2 oz (118.5 kg)  BMI 36.95 kg/m2 Estimated body mass index is 36.95 kg/(m^2) as calculated from the following:    Height as of 6/6/18: 5' 10.5\" (1.791 m).    Weight as of this encounter: 261 lb 3.2 oz (118.5 kg).  Physical Exam  GENERAL: healthy, alert and no distress  EYES: " Eyes grossly normal to inspection, PERRL and conjunctivae and sclerae normal  HENT: ear canals and TM's normal, nose and mouth without ulcers or lesions  NECK: no adenopathy, no asymmetry, masses, or scars and thyroid normal to palpation  RESP: lungs clear to auscultation - no rales, rhonchi or wheezes  CV: regular rate and rhythm, normal S1 S2, no S3 or S4, no murmur, click or rub, no peripheral edema and peripheral pulses strong  ABDOMEN: soft, nontender, no hepatosplenomegaly, no masses and bowel sounds normal  MS: no gross musculoskeletal defects noted, no edema  SKIN: no suspicious lesions or rashes  NEURO: Normal strength and tone, mentation intact and speech normal  PSYCH: mentation appears normal, affect normal/bright    ASSESSMENT / PLAN:   (Z00.00) Medicare annual wellness visit, subsequent  (primary encounter diagnosis)  Comment: preventive needs reviewed  Plan: see orders in Epic.     (I10) Hypertension goal BP (blood pressure) < 150/90  Comment: to goal  Plan: metoprolol tartrate (LOPRESSOR) 50 MG tablet,         losartan (COZAAR) 50 MG tablet, **Basic         metabolic panel FUTURE anytime         Refill x 6 months , f/u 6 months for OV and labs - bp check    (E78.5) Hyperlipidemia LDL goal <130  Comment: stable  Plan: simvastatin (ZOCOR) 10 MG tablet, Lipid panel         reflex to direct LDL Fasting         Refill x 6 months     (Z12.5) Special screening for malignant neoplasm of prostate  Comment: due  Plan: PSA, screen              End of Life Planning:  Patient currently has an advanced directive: No.  I have verified the patient's ablity to prepare an advanced directive/make health care decisions.  Literature was provided to assist patient in preparing an advanced directive.    COUNSELING:  Reviewed preventive health counseling, as reflected in patient instructions       Regular exercise       Healthy diet/nutrition        Estimated body mass index is 36.95 kg/(m^2) as calculated from the  "following:    Height as of 6/6/18: 5' 10.5\" (1.791 m).    Weight as of this encounter: 261 lb 3.2 oz (118.5 kg).  Weight management plan: Discussed healthy diet and exercise guidelines and patient will follow up in 6 months in clinic to re-evaluate.   reports that he quit smoking about 34 years ago. His smoking use included Cigarettes and Pipe. He started smoking about 50 years ago. He has a 10.00 pack-year smoking history. He has never used smokeless tobacco.      Appropriate preventive services were discussed with this patient, including applicable screening as appropriate for cardiovascular disease, diabetes, osteopenia/osteoporosis, and glaucoma.  As appropriate for age/gender, discussed screening for colorectal cancer, prostate cancer, breast cancer, and cervical cancer. Checklist reviewing preventive services available has been given to the patient.    Reviewed patients plan of care and provided an AVS. The Basic Care Plan (routine screening as documented in Health Maintenance) for Rm meets the Care Plan requirement. This Care Plan has been established and reviewed with the Patient.    Counseling Resources:  ATP IV Guidelines  Pooled Cohorts Equation Calculator  Breast Cancer Risk Calculator  FRAX Risk Assessment  ICSI Preventive Guidelines  Dietary Guidelines for Americans, 2010  [employer]'s MyPlate  ASA Prophylaxis  Lung CA Screening    Riddhi Lopez MD  [facility] [place]  Answers for HPI/ROS submitted by the patient on 6/19/2018   PHQ-2 Score: 0    "

## 2018-08-16 ENCOUNTER — HOSPITAL ENCOUNTER (OUTPATIENT)
Dept: OCCUPATIONAL THERAPY | Facility: CLINIC | Age: 73
Setting detail: THERAPIES SERIES
End: 2018-08-16
Attending: FAMILY MEDICINE
Payer: COMMERCIAL

## 2018-08-16 PROCEDURE — 97165 OT EVAL LOW COMPLEX 30 MIN: CPT | Mod: GO | Performed by: OCCUPATIONAL THERAPIST

## 2018-08-16 PROCEDURE — 97140 MANUAL THERAPY 1/> REGIONS: CPT | Mod: GO | Performed by: OCCUPATIONAL THERAPIST

## 2018-08-16 PROCEDURE — 97535 SELF CARE MNGMENT TRAINING: CPT | Mod: GO | Performed by: OCCUPATIONAL THERAPIST

## 2018-08-16 PROCEDURE — G8987 SELF CARE CURRENT STATUS: HCPCS | Mod: GO,CK | Performed by: OCCUPATIONAL THERAPIST

## 2018-08-16 PROCEDURE — G8988 SELF CARE GOAL STATUS: HCPCS | Mod: GO,CI | Performed by: OCCUPATIONAL THERAPIST

## 2018-08-16 PROCEDURE — 40000445 ZZHC STATISTIC OT VISIT, LYMPHEDEMA: Performed by: OCCUPATIONAL THERAPIST

## 2018-08-16 NOTE — PROGRESS NOTES
[facility]        OUTPATIENT OCCUPATIONAL THERAPY EDEMA EVALUATION  PLAN OF TREATMENT FOR OUTPATIENT REHABILITATION  (COMPLETE FOR INITIAL CLAIMS ONLY)  Patient's Last Name, First Name, Rm Carcamo                           Provider s Name:   [facility] Medical Record No.  [id number]     Start of Care Date:  08/16/18   Onset Date:  06/06/18   Type:  OT   Medical Diagnosis:  BLE edema/lymphedema   Therapy Diagnosis:  BLE lymphedema/edema Visits from SOC:  1                                     __________________________________________________________________________________   Plan of Treatment/Functional Goals:    Manual lymph drainage, Gradient compression bandaging, Fit for compression garment, Exercises, Precautions to prevent infection / exacerbation, Education, Manual therapy, ADL training, Skin care / precautions, Home management program development        GOALS  1. Goal description: Pt will demonstrate IND with home program including: GCB to LLE , self MLD and HEP to reduce edema to improve skin integrity, prevent infection and to be fit for compression garments for edema management at discharge.       Target date: 09/27/18  2. Goal description: Pt will demonstrate a 200-300mL reduction in LLE volume to preserve skin integrity, prevent infection, prevent progression of edema and to be fit for compression stockings.       Target date: 09/27/18  3. Goal description: Pt will be IND with donning compression stocking and verbalizing wear/wash/repalce schedule for edema management at discharge.        Target date: 09/27/18  4. Goal description: Pt will, independently, verbalize the signs/symptoms of lymphedema, precautions and how to obtain a future lymphedema referral if edema presents to preserve skin integrity, prevent infection and preserve functional  mobility.          Target date: 09/27/18  5.            6.               7.             8.              Treatment frequency: 2 times / week   Treatment duration: x5 weeks; 9/27/18    Melissa Kim OT                                    I CERTIFY THE NEED FOR THESE SERVICES FURNISHED UNDER        THIS PLAN OF TREATMENT AND WHILE UNDER MY CARE     (Physician co-signature of this document indicates review and certification of the therapy plan).                   Certification date from: 08/16/18       Certification date to: 09/27/18           Referring physician: Jatinder Giraldo MD   Initial Assessment  See Epic Evaluation- Start of care: 08/16/18

## 2018-08-16 NOTE — PROGRESS NOTES
08/16/18 1432   Quick Adds   Quick Adds Certification   Rehab Discipline   Discipline OT   Type of Visit   Type of visit Initial Edema Evaluation       present No   General Information   Start of care 08/16/18   Referring physician Jatinder Giraldo MD   Orders Evaluate and treat as indicated   Order date 06/06/18   Medical diagnosis H/o venous stasis dermatitis.  Pt presents iwth concerns of BLE edema; LLE>RLE   Onset of illness / date of surgery 06/06/18   Edema onset 06/06/18   Affected body parts LLE;RLE   Edema etiology Chronic Venous Insufficiency   Edema etiology comments venous stasis dermatitis, decreased activity and muscle pump action   Pertinent history of current problem (PT: include personal factors and/or comorbidities that impact the POC; OT: include additional occupational profile info) Pt has tried elevating legs, increasing activity and watching his diet iwth some improvement in BLE edema but it is still present. Most significant in feet.   Surgical / medical history reviewed Yes   Prior level of functional mobility IND   Prior treatment Elevation   Patient role / employment history Retired   Living environment House / townhome   Living environment comments lives with spouse, no difficulties   System Outcome Measures   Outcome Measures Lymphedema   Lymphedema Life Impact Scale (score range 0-72). A higher score indicates greater impairment. 10   Subjective Report   Patient report of symptoms BLE edema, discomfort in BLEs   Patient / Family Goals   Patient / family goals statement to reduce the size of legs, make sure it doesn't get worse   Pain   Patient currently in pain No   Pain comments occassional discomfort due to edema in feet and ankles   Cognitive Status   Orientation Orientation to person, place and time   Level of consciousness Alert   Follows commands and answers questions 100% of the time   Edema Exam / Assessment   Skin condition comments RLE with minimal edema  of foot and ankle, mild edema from above ankle to midshin, trace edema from midshin to knee crease, no edema of thigh.  No pitting . LLE with 1-2+ pitting in foot and ankle, 1+ pitting from above ankle to midshin, minimal edema from midshin to knee crease, no edema of thigh. Pink coloring in left foot and ankle.     Scar No   Ulceration No   Girth Measurements   Girth Measurements Refer to separate girth measurement flowsheet   Range of Motion   ROM comments BLE AROM for all muscle groups WNL   Posture   Posture Normal   Activities of Daily Living   Activities of Daily Living IND   Bed Mobility   Bed mobility IND   Transfers   Transfers IND   Gait / Locomotion   Gait / Locomotion IND   Sensory   Sensory perception comments occassional numbness and tingling in feet and toes   Planned Edema Interventions   Planned edema interventions Manual lymph drainage;Gradient compression bandaging;Fit for compression garment;Exercises;Precautions to prevent infection / exacerbation;Education;Manual therapy;ADL training;Skin care / precautions;Home management program development   Clinical Impression   Criteria for skilled therapeutic intervention met Yes   Therapy diagnosis BLE lymphedema/edema   Assessment of Occupational Performance 1-3 Performance Deficits   Identified Performance Deficits at risk for the progression of lymphedema, chronic BLE edema   Clinical Decision Making (Complexity) Low complexity   Treatment frequency 2 times / week   Treatment duration x5 weeks; 9/27/18   Patient / family and/or staff in agreement with plan of care Yes   Risks and benefits of therapy have been explained Yes   Clinical impression comments Patient presents with edema of BLEs; chronic in nature and does not resolve with elevation.  Pt has tried compression stockings but edema persists.  Pt will benefit from continued skilled OT intervention to address edema in BLEs.  At this time, pt is most concerned with LLE edema as it is more  prominent versus RLE and wishes to only address LLE.     Goals   Edema Eval Goals 1;2;3;4   Goal 1   Goal identifier home program   Goal description Pt will demonstrate IND with home program including: GCB to LLE , self MLD and HEP to reduce edema to improve skin integrity, prevent infection and to be fit for compression garments for edema management at discharge.   Target date 09/27/18   Goal 2   Goal identifier volume   Goal description Pt will demonstrate a 200-300mL reduction in LLE volume to preserve skin integrity, prevent infection, prevent progression of edema and to be fit for compression stockings.   Target date 09/27/18   Goal 3   Goal identifier discharge   Goal description Pt will be IND with donning compression stocking and verbalizing wear/wash/repalce schedule for edema management at discharge.    Target date 09/27/18   Goal 4   Goal identifier signs/symptoms   Goal description Pt will, independently, verbalize the signs/symptoms of lymphedema, precautions and how to obtain a future lymphedema referral if edema presents to preserve skin integrity, prevent infection and preserve functional mobility.      Target date 09/27/18   Total Evaluation Time   Total evaluation time 20   Certification   Certification date from 08/16/18   Certification date to 09/27/18   Medical Diagnosis BLE edema/lymphedema

## 2018-08-20 ENCOUNTER — HOSPITAL ENCOUNTER (OUTPATIENT)
Dept: OCCUPATIONAL THERAPY | Facility: CLINIC | Age: 73
Setting detail: THERAPIES SERIES
End: 2018-08-20
Attending: FAMILY MEDICINE
Payer: COMMERCIAL

## 2018-08-20 PROCEDURE — 97140 MANUAL THERAPY 1/> REGIONS: CPT | Mod: GO | Performed by: OCCUPATIONAL THERAPIST

## 2018-08-20 PROCEDURE — 40000445 ZZHC STATISTIC OT VISIT, LYMPHEDEMA: Performed by: OCCUPATIONAL THERAPIST

## 2018-08-30 DIAGNOSIS — Z12.5 SPECIAL SCREENING FOR MALIGNANT NEOPLASM OF PROSTATE: ICD-10-CM

## 2018-08-30 DIAGNOSIS — I10 HYPERTENSION GOAL BP (BLOOD PRESSURE) < 150/90: ICD-10-CM

## 2018-08-30 DIAGNOSIS — E78.5 HYPERLIPIDEMIA LDL GOAL <130: ICD-10-CM

## 2018-08-30 LAB
ANION GAP SERPL CALCULATED.3IONS-SCNC: 7 MMOL/L (ref 3–14)
BUN SERPL-MCNC: 19 MG/DL (ref 7–30)
CALCIUM SERPL-MCNC: 8.9 MG/DL (ref 8.5–10.1)
CHLORIDE SERPL-SCNC: 103 MMOL/L (ref 94–109)
CHOLEST SERPL-MCNC: 199 MG/DL
CO2 SERPL-SCNC: 30 MMOL/L (ref 20–32)
CREAT SERPL-MCNC: 0.83 MG/DL (ref 0.66–1.25)
GFR SERPL CREATININE-BSD FRML MDRD: >90 ML/MIN/1.7M2
GLUCOSE SERPL-MCNC: 95 MG/DL (ref 70–99)
HDLC SERPL-MCNC: 56 MG/DL
LDLC SERPL CALC-MCNC: 119 MG/DL
NONHDLC SERPL-MCNC: 143 MG/DL
POTASSIUM SERPL-SCNC: 4.4 MMOL/L (ref 3.4–5.3)
PSA SERPL-ACNC: 2.38 UG/L (ref 0–4)
SODIUM SERPL-SCNC: 140 MMOL/L (ref 133–144)
TRIGL SERPL-MCNC: 118 MG/DL

## 2018-08-30 PROCEDURE — 80061 LIPID PANEL: CPT | Performed by: FAMILY MEDICINE

## 2018-08-30 PROCEDURE — 80048 BASIC METABOLIC PNL TOTAL CA: CPT | Performed by: FAMILY MEDICINE

## 2018-08-30 PROCEDURE — 36415 COLL VENOUS BLD VENIPUNCTURE: CPT | Performed by: FAMILY MEDICINE

## 2018-08-30 PROCEDURE — G0103 PSA SCREENING: HCPCS | Performed by: FAMILY MEDICINE

## 2018-09-12 NOTE — PROGRESS NOTES
"  SUBJECTIVE:   Rm Maldonado is a 73 year old male who presents for Preventive Visit.      Are you in the first 12 months of your Medicare Part B coverage?  No    Healthy Habits:    Do you get at least three servings of calcium containing foods daily (dairy, green leafy vegetables, etc.)? Yes, also taking supplement     Amount of exercise or daily activities, outside of work: Counting steps, trying to increase walking     Problems taking medications regularly No    Medication side effects: No    Have you had an eye exam in the past two years? yes    Do you see a dentist twice per year? no    Do you have sleep apnea, excessive snoring or daytime drowsiness? Concerns with sleep apnea ***       Ability to successfully perform activities of daily living: Yes, no assistance needed    Home safety:  none identified     Hearing impairment: Yes, Tinnitus, higher level hearing loss.  Difficulty when multiple conversations going on at the same time     Fall risk:  Fallen 2 or more times in the past year?: Yes  Any fall with injury in the past year?: No    {If any of the above assessments are answered yes, consider ordering appropriate referrals (Optional):732112::\"click delete button to remove this line now\"}    {AWV Cognitive Screenin}        {additional problems to add (Optional):547478}    Reviewed and updated as needed this visit by clinical staff  Tobacco  Allergies  Meds  Med Hx  Surg Hx  Fam Hx  Soc Hx        Reviewed and updated as needed this visit by Provider        Social History   Substance Use Topics     Smoking status: Former Smoker     Packs/day: 1.00     Years: 10.00     Types: Cigarettes, Pipe     Start date: 5/15/1968     Quit date: 1983     Smokeless tobacco: Never Used     Alcohol use Yes      Comment: Minimal to light       If you drink alcohol do you typically have >3 drinks per day or >7 drinks per week? No                        Today's PHQ-2 Score:   PHQ-2 (  [employer]) 2018 " "6/6/2018   Q1: Little interest or pleasure in doing things 0 0   Q2: Feeling down, depressed or hopeless 0 0   PHQ-2 Score 0 0   Q1: Little interest or pleasure in doing things Not at all -   Q2: Feeling down, depressed or hopeless Not at all -   PHQ-2 Score 0 -       Do you feel safe in your environment - Yes    Do you have a Health Care Directive?: No: Advance care planning was reviewed with patient; patient declined at this time.      Family history of prostate cancer: {Yes /No default.:195837::\"No\"}  Last PSA:   PSA   Date Value Ref Range Status   08/30/2018 2.38 0 - 4 ug/L Final     Comment:     Assay Method:  Chemiluminescence using Siemens Vista analyzer     Doing self testicular exam: {YES/NO (DEFAULT IS YES):904861::\"YES\"}     Family history of colon cancer:{Yes /No default.:483630::\"No\"}  Last colonscopy: ***     Family history of CAD:{Yes /No default.:041961::\"No\"}  Last Cholesterol:   Lab Results   Component Value Date    CHOL 199 08/30/2018     Lab Results   Component Value Date    HDL 56 08/30/2018     Lab Results   Component Value Date     08/30/2018     Lab Results   Component Value Date    TRIG 118 08/30/2018     Lab Results   Component Value Date    CHOLHDLRATIO 3.6 10/26/2015          Immunizations:    Date last DT ***,  Date last Pneumovax ***,   Date last Flu ***    Balanced Diet:{YES/NO (DEFAULT IS YES):984917::\"YES\"}  Regular Exercise:{YES/NO (DEFAULT IS YES):340973::\"YES\"}   Seat Belt:{YES/NO (DEFAULT IS YES):583510::\"YES\"}   Sunscreen use: {YES/NO (DEFAULT IS YES):503932::\"YES\"}  Calcium Intake: ***   Health Care Directive:{YES/NO (DEFAULT IS YES):224097::\"YES\"}   Sexually Active:{YES/NO (DEFAULT IS YES):704241::\"YES\"}      Current contraception: {contraception:706}     Current Concerns: ***          Patient Ed:  Reviewed health maintenance including diet, regular exercise, and periodic exams.     The risks, benefits, and treatment options of prescribed medications or other treatments " "have been discussed with the patient.  The patient should call or schedule a follow up appt if no improvement or other problems.     Current providers sharing in care for this patient include:   Patient Care Team:  Riddhi Lopez MD as PCP - General (Family Practice)    The following health maintenance items are reviewed in Epic and correct as of today:  Health Maintenance   Topic Date Due     INFLUENZA VACCINE (1) 2018     BMP Q6 MOS  2019     FALL RISK ASSESSMENT  2019     PNEUMOCOCCAL (2 of 2 - PPSV23) 2019     PHQ-2 Q1 YR  2019     COLONOSCOPY Q3 YR  2019     LIPID MONITORING Q1 YEAR  2019     TETANUS IMMUNIZATION (SYSTEM ASSIGNED)  09/15/2020     ADVANCE DIRECTIVE PLANNING Q5 YRS  2023     AORTIC ANEURYSM SCREENING (SYSTEM ASSIGNED)  Completed     HEPATITIS C SCREENING  Completed     {Chronicprobdata (Optional):942395}    {Decision Support (Optional):796653}    ROS:  {ROS COMP:154119}    OBJECTIVE:   /87  Pulse 60  Temp 97.6  F (36.4  C) (Oral)  Resp 20  Wt 255 lb 6.4 oz (115.8 kg)  SpO2 95%  BMI 36.13 kg/m2 Estimated body mass index is 36.13 kg/(m^2) as calculated from the following:    Height as of 18: 5' 10.5\" (1.791 m).    Weight as of this encounter: 255 lb 6.4 oz (115.8 kg).  EXAM:   {Exam :223853}    {Diagnostic Test Results (Optional):005100::\"Diagnostic Test Results:\",\"none \"}    ASSESSMENT / PLAN:   {Diag Picklist:861172}    End of Life Planning:  Patient currently has an advanced directive: { :099012}    COUNSELING:  {Medicare Counselin}    BP Readings from Last 1 Encounters:   18 133/87     Estimated body mass index is 36.13 kg/(m^2) as calculated from the following:    Height as of 18: 5' 10.5\" (1.791 m).    Weight as of this encounter: 255 lb 6.4 oz (115.8 kg).    {BP Counseling- Complete if BP >= 120/80  (Optional):298162}  {Weight Management Plan (ACO) Complete if BMI is abnormal-  Ages 18-64  BMI >24.9.  " Age 65+ with BMI <23 or >30 (Optional):806501}     reports that he quit smoking about 35 years ago. His smoking use included Cigarettes and Pipe. He started smoking about 50 years ago. He has a 10.00 pack-year smoking history. He has never used smokeless tobacco.  {Tobacco Cessation -- Complete if patient is a smoker (Optional):720365}    Appropriate preventive services were discussed with this patient, including applicable screening as appropriate for cardiovascular disease, diabetes, osteopenia/osteoporosis, and glaucoma.  As appropriate for age/gender, discussed screening for colorectal cancer, prostate cancer, breast cancer, and cervical cancer. Checklist reviewing preventive services available has been given to the patient.    Reviewed patients plan of care and provided an AVS. The {CarePlan:450775} for Rm meets the Care Plan requirement. This Care Plan has been established and reviewed with the {PATIENT, FAMILY MEMBER, CAREGIVER:701215}.    Counseling Resources:  ATP IV Guidelines  Pooled Cohorts Equation Calculator  Breast Cancer Risk Calculator  FRAX Risk Assessment  ICSI Preventive Guidelines  Dietary Guidelines for Americans, 2010  USDA's MyPlate  ASA Prophylaxis  Lung CA Screening    Riddhi Lopez MD  [facility]

## 2018-09-18 ENCOUNTER — OFFICE VISIT (OUTPATIENT)
Dept: FAMILY MEDICINE | Facility: CLINIC | Age: 73
End: 2018-09-18
Payer: COMMERCIAL

## 2018-09-18 VITALS
TEMPERATURE: 97.6 F | BODY MASS INDEX: 36.13 KG/M2 | WEIGHT: 255.4 LBS | OXYGEN SATURATION: 95 % | HEART RATE: 60 BPM | DIASTOLIC BLOOD PRESSURE: 87 MMHG | RESPIRATION RATE: 20 BRPM | SYSTOLIC BLOOD PRESSURE: 133 MMHG

## 2018-09-18 DIAGNOSIS — I89.0 LYMPHEDEMA OF BOTH LOWER EXTREMITIES: Primary | ICD-10-CM

## 2018-09-18 DIAGNOSIS — E66.01 MORBID OBESITY (H): ICD-10-CM

## 2018-09-18 PROCEDURE — 99213 OFFICE O/P EST LOW 20 MIN: CPT | Performed by: FAMILY MEDICINE

## 2018-09-18 NOTE — NURSING NOTE
"Chief Complaint   Patient presents with     Wellness Visit       Initial /87  Pulse 60  Temp 97.6  F (36.4  C) (Oral)  Resp 20  Wt 255 lb 6.4 oz (115.8 kg)  SpO2 95%  BMI 36.13 kg/m2 Estimated body mass index is 36.13 kg/(m^2) as calculated from the following:    Height as of 6/6/18: 5' 10.5\" (1.791 m).    Weight as of this encounter: 255 lb 6.4 oz (115.8 kg).  Medication Reconciliation: complete  Felipe Lucas CMA    "

## 2018-09-23 NOTE — PROGRESS NOTES
SUBJECTIVE:   Rm Maldonado is a 73 year old male who presents to clinic today for the following health issues:       Here for f/u of lymphedema, has had for years in both legs, left worse than right.  Trial of water pill helped slightly. No source ever revealed despite work up.    Using stockings when he can and they help slightly.  Has been seen in lymphedema clinic as well  He has researched this and doesn't feel there is much more to do for this.  He feels it is optimally controlled with what he is doing.          Problem list and histories reviewed & adjusted, as indicated.  Additional history: as documented    Patient Active Problem List   Diagnosis     Exogenous obesity     Hypertension goal BP (blood pressure) < 150/90     Eczema     Advanced directives, counseling/discussion     Vitamin D deficiency     Colon polyp     Paralyzed hemidiaphragm     Hyperlipidemia LDL goal <130     Venous stasis dermatitis of left lower extremity     Bilateral leg edema     Obesity (BMI 35.0-39.9) with comorbidity (H)     Past Surgical History:   Procedure Laterality Date     ABDOMEN SURGERY       BIOPSY       COLONOSCOPY       COLONOSCOPY WITH CO2 INSUFFLATION N/A 8/18/2016    Procedure: COLONOSCOPY WITH CO2 INSUFFLATION;  Surgeon: Ariel Manzanares MD;  Location: [facility] OR     EYE SURGERY      left cataract      HERNIA REPAIR       HERNIA REPAIR       TONSILLECTOMY         Social History   Substance Use Topics     Smoking status: Former Smoker     Packs/day: 1.00     Years: 10.00     Types: Cigarettes, Pipe     Start date: 5/15/1968     Quit date: 8/19/1983     Smokeless tobacco: Never Used     Alcohol use Yes      Comment: Minimal to light     Family History   Problem Relation Age of Onset     C.A.D. Mother      HEART DISEASE Mother      Diabetes Mother      Obesity Mother      Alcohol/Drug Father      Obesity Daughter          Current Outpatient Prescriptions   Medication Sig Dispense Refill     Ascorbic Acid (VITAMIN  C PO) Take 2,000 mg by mouth.       aspirin 325 MG EC tablet Take 325 mg by mouth daily.       calcium carbonate (OS-GENE 500 MG [place]. [place]) 500 MG tablet Take 1,000 mg by mouth daily       FUROSEMIDE PO Take 20 mg by mouth daily       lecithin 1200 MG CAPS Take 400 mg by mouth daily        losartan (COZAAR) 50 MG tablet Take 1 tablet (50 mg) by mouth daily 90 tablet 1     metoprolol tartrate (LOPRESSOR) 50 MG tablet Take 1 tablet (50 mg) by mouth 2 times daily 180 tablet 1     Omega-3 Fatty Acids (FISH OIL) 1200 MG capsule Take 2 capsules by mouth daily        simvastatin (ZOCOR) 10 MG tablet Take 1 tablet (10 mg) by mouth At Bedtime 90 tablet 3     VITAMIN D, CHOLECALCIFEROL, PO Take 5,000 Units by mouth daily       vitamin E 400 UNITS TABS Take 400 Units by mouth daily       BP Readings from Last 3 Encounters:   09/18/18 133/87   06/19/18 128/80   06/06/18 130/80    Wt Readings from Last 3 Encounters:   09/18/18 255 lb 6.4 oz (115.8 kg)   06/19/18 261 lb 3.2 oz (118.5 kg)   06/06/18 263 lb (119.3 kg)                  Labs reviewed in EPIC    Reviewed and updated as needed this visit by clinical staff  Tobacco  Allergies  Meds  Problems  Med Hx  Surg Hx  Fam Hx  Soc Hx        Reviewed and updated as needed this visit by Provider  Allergies  Meds  Problems         ROS:  Constitutional, HEENT, cardiovascular, pulmonary, gi and gu systems are negative, except as otherwise noted.    OBJECTIVE:     /87  Pulse 60  Temp 97.6  F (36.4  C) (Oral)  Resp 20  Wt 255 lb 6.4 oz (115.8 kg)  SpO2 95%  BMI 36.13 kg/m2  Body mass index is 36.13 kg/(m^2).  GENERAL: healthy, alert and no distress  EYES: Eyes grossly normal to inspection, PERRL and conjunctivae and sclerae normal  RESP: lungs clear to auscultation - no rales, rhonchi or wheezes  CV: regular rate and rhythm, normal S1 S2, no S3 or S4, no murmur, click or rub, no peripheral edema and peripheral pulses strong  MS: extremities normal- no gross  deformities noted 1+ lymphedema left leg, right leg with trace  SKIN: no suspicious lesions or rashes  PSYCH: mentation appears normal, affect normal/bright    Diagnostic Test Results:  none     ASSESSMENT/PLAN:     (I89.0) Lymphedema of both lower extremities  (primary encounter diagnosis)  Comment: stable  Plan: **Basic metabolic panel FUTURE anytime        Due for labs, ok to use lasix prn edema    (E66.01) Morbid obesity (H)  Comment: stable  Plan: continue to exercise and moderate eating    Patient Instructions     Ok to use water pill as needed for swelling.        Next visit due in December.      Riddhi Lopez MD  [facility]

## 2019-01-25 NOTE — PROGRESS NOTES
Outpatient Occupational Therapy Discharge Note     Patient: Rm Maldonado  : 1945    Beginning/End Dates of Reporting Period:  18 to 18    Referring Provider: Jatinder Giraldo MD    Therapy Diagnosis: BLE edema/lymphedema    Client Self Report:   0    Objective Measurements:     Objective Measure: skin assessment   Details: RLE with trace edema from digits to knee crease, no edema of thigh. No pitting . LLE with 1-2+ pitting in foot and ankle, 1+ pitting from above ankle to midshin, minimal edema from midshin to knee crease, no edema of thigh. Pink coloring in left foot and ankle. 2 inch x 1 inch dry skin patch on anterior left leg, just inferior to knee crease.     Objective Measure: volume   Details: NT   Objective Measure: LLIS   Details: NT        Goals:   Goal Identifier home program   Goal Description Pt will demonstrate IND with home program including: GCB to LLE , self MLD and HEP to reduce edema to improve skin integrity, prevent infection and to be fit for compression garments for edema management at discharge.   Target Date 18   Date Met      Progress:     Goal Identifier volume   Goal Description Pt will demonstrate a 200-300mL reduction in LLE volume to preserve skin integrity, prevent infection, prevent progression of edema and to be fit for compression stockings.   Target Date 18   Date Met      Progress:     Goal Identifier discharge   Goal Description Pt will be IND with donning compression stocking and verbalizing wear/wash/repalce schedule for edema management at discharge.    Target Date 18   Date Met      Progress:     Goal Identifier signs/symptoms   Goal Description Pt will, independently, verbalize the signs/symptoms of lymphedema, precautions and how to obtain a future lymphedema referral if edema presents to preserve skin integrity, prevent infection and preserve functional mobility.      Target Date 18   Date Met      Progress:     Goal  Identifier     Goal Description     Target Date     Date Met      Progress:       Progress Toward Goals:   Pt seen for two treatment sessions, unable to assess progress.     Plan:  Discharge from therapy.    Discharge:    Reason for Discharge: Patient has failed to schedule further appointments.    Equipment Issued: GCB materials.     Discharge Plan: Pt to obtain future lymphedema referral should lymphedema present.

## 2019-01-25 NOTE — ADDENDUM NOTE
Encounter addended by: Melissa Kim, OT on: 1/25/2019 3:18 PM   Actions taken: Sign clinical note, Episode resolved

## 2019-02-09 DIAGNOSIS — I10 HYPERTENSION GOAL BP (BLOOD PRESSURE) < 150/90: ICD-10-CM

## 2019-02-11 RX ORDER — LOSARTAN POTASSIUM 50 MG/1
50 TABLET ORAL DAILY
Qty: 90 TABLET | Refills: 1 | Status: SHIPPED | OUTPATIENT
Start: 2019-02-11 | End: 2019-11-21

## 2019-02-11 RX ORDER — METOPROLOL TARTRATE 50 MG
TABLET ORAL
Qty: 180 TABLET | Refills: 1 | Status: SHIPPED | OUTPATIENT
Start: 2019-02-11 | End: 2019-11-21

## 2019-04-15 ENCOUNTER — TELEPHONE (OUTPATIENT)
Dept: FAMILY MEDICINE | Facility: CLINIC | Age: 74
End: 2019-04-15

## 2019-04-15 DIAGNOSIS — Z12.11 SPECIAL SCREENING FOR MALIGNANT NEOPLASMS, COLON: Primary | ICD-10-CM

## 2019-04-15 NOTE — TELEPHONE ENCOUNTER
Patient sent My Chart request for orders for Colonoscopy,  Comments:   Greetings,      I would like to schedule my Colonoscopy exam with Dr. Lam in mid September.      Thank you   Morris

## 2019-04-15 NOTE — TELEPHONE ENCOUNTER
Sent my chart message to patient. With direction to call [facility] [phone]  DIANA Honeycutt

## 2019-04-15 NOTE — TELEPHONE ENCOUNTER
Information below is reviewed with  Dr Riddhi Lopez, Verbal Orders to place referral for screen Colonoscopy.  Please inform patient and he can schedule an appointment.  Samia Hernandez RN

## 2019-05-10 DIAGNOSIS — I89.0 LYMPHEDEMA OF BOTH LOWER EXTREMITIES: ICD-10-CM

## 2019-05-10 LAB
ANION GAP SERPL CALCULATED.3IONS-SCNC: 5 MMOL/L (ref 3–14)
BUN SERPL-MCNC: 21 MG/DL (ref 7–30)
CALCIUM SERPL-MCNC: 9 MG/DL (ref 8.5–10.1)
CHLORIDE SERPL-SCNC: 104 MMOL/L (ref 94–109)
CO2 SERPL-SCNC: 31 MMOL/L (ref 20–32)
CREAT SERPL-MCNC: 0.97 MG/DL (ref 0.66–1.25)
GFR SERPL CREATININE-BSD FRML MDRD: 77 ML/MIN/{1.73_M2}
GLUCOSE SERPL-MCNC: 101 MG/DL (ref 70–99)
POTASSIUM SERPL-SCNC: 4.1 MMOL/L (ref 3.4–5.3)
SODIUM SERPL-SCNC: 140 MMOL/L (ref 133–144)

## 2019-05-10 PROCEDURE — 80048 BASIC METABOLIC PNL TOTAL CA: CPT | Performed by: FAMILY MEDICINE

## 2019-05-10 PROCEDURE — 36415 COLL VENOUS BLD VENIPUNCTURE: CPT | Performed by: FAMILY MEDICINE

## 2019-05-15 ENCOUNTER — NURSE TRIAGE (OUTPATIENT)
Dept: NURSING | Facility: CLINIC | Age: 74
End: 2019-05-15

## 2019-05-16 ENCOUNTER — OFFICE VISIT (OUTPATIENT)
Dept: FAMILY MEDICINE | Facility: CLINIC | Age: 74
End: 2019-05-16
Payer: COMMERCIAL

## 2019-05-16 VITALS
BODY MASS INDEX: 32.9 KG/M2 | WEIGHT: 235 LBS | OXYGEN SATURATION: 94 % | HEART RATE: 89 BPM | TEMPERATURE: 98.1 F | RESPIRATION RATE: 16 BRPM | DIASTOLIC BLOOD PRESSURE: 84 MMHG | HEIGHT: 71 IN | SYSTOLIC BLOOD PRESSURE: 136 MMHG
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
## 2019-05-16 PROCEDURE — 99213 OFFICE O/P EST LOW 20 MIN: CPT | Performed by: FAMILY MEDICINE

## 2019-05-16 PROCEDURE — 99207 C PAF COMPLETED  NO CHARGE: CPT | Performed by: FAMILY MEDICINE

## 2019-05-16 RX ORDER — DOXYCYCLINE HYCLATE 100 MG
100 TABLET ORAL 2 TIMES DAILY
Qty: 14 TABLET | Refills: 0 | Status: SHIPPED | OUTPATIENT
Start: 2019-05-16 | End: 2019-05-23

## 2019-05-16 ASSESSMENT — MIFFLIN-ST. JEOR: SCORE: 1830.14

## 2019-05-16 NOTE — NURSING NOTE
"Chief Complaint   Patient presents with     Insect Bites       Initial /84   Pulse 89   Temp 98.1  F (36.7  C) (Oral)   Resp 16   Ht 1.791 m (5' 10.5\")   Wt 106.6 kg (235 lb)   SpO2 94%   BMI 33.24 kg/m   Estimated body mass index is 33.24 kg/m  as calculated from the following:    Height as of this encounter: 1.791 m (5' 10.5\").    Weight as of this encounter: 106.6 kg (235 lb).    Kavitha Lu, CACHORRO    "

## 2019-05-16 NOTE — PROGRESS NOTES
"SUBJECTIVE:  Rm Maldonado, a 73 year old male scheduled an appointment to discuss the following issues:  Tick bite right thigh last week. At home and likely deer tick. No fevers or chills. No lymes in past.   Pulled out head. No fevers or chills. Some spreading redness. No nausea, vomiting or diarrhea or body aches.   Medical, social, surgical, and family histories reviewed.    ROS:  All other ROS negative.    OBJECTIVE:  /84   Pulse 89   Temp 98.1  F (36.7  C) (Oral)   Resp 16   Ht 1.791 m (5' 10.5\")   Wt 106.6 kg (235 lb)   SpO2 94%   BMI 33.24 kg/m    EXAM:  GENERAL APPEARANCE: healthy, alert and no distress  RESP: lungs clear to auscultation - no rales, rhonchi or wheezes  CV: regular rates and rhythm, normal S1 S2, no S3 or S4 and no murmur, click or rub -  SKIN: bulls eye red rash left upper/inner thigh.   PSYCH: mentation appears normal and affect normal/bright    ASSESSMENT / PLAN:  (W57.XXXA) Tick bite, initial encounter  (primary encounter diagnosis)  Comment: patient believes deer tick so high risk for lymes but could be early cellulitis too  Plan: doxycycline hyclate (VIBRA-TABS) 100 MG tablet        Reveiwed risks and side effects of medication  Close observation. To ER if markedly worsening pain/swelling/redness or fevers or chills. Call/email with questions/concerns.     Kehinde Mcqueen MD        "

## 2019-05-16 NOTE — TELEPHONE ENCOUNTER
"\"I noticed yesterday a deer tick bite on my right inner thigh . Tonight there's a bulls eye ring and redness around it. \" Denies fever. Triaged and advised to be seen within 24 hours.  Portia Loco RN [facility]        Reason for Disposition    Red ring or bull's-eye rash occurs at tick bite    Additional Information    Negative: Sounds like a life-threatening emergency to the triager    Negative: Not a tick bite    Negative: [1] 2 to 14 days following tick bite AND [2] severe headache with fever occurs    Negative: [1] 2 to 14 days following tick bite AND [2] widespread rash with fever occurs    Negative: Patient sounds very sick or weak to the triager    Negative: [1] Fever AND [2] red area    Negative: [1] Fever AND [2] area is very tender to touch    Negative: [1] Red streak or red line AND [2] length > 2 inches (5 cm)    Negative: Can't remove live tick (after trying Care Advice)    Negative: [1] 2 to 14 days following tick bite AND [2] fever AND [3] no rash or headache    Negative: [1] 2 to 14 days following tick bite AND [2] widespread rash or headache AND [3] no fever    Negative: [1] Red or very tender (to touch) area AND [2] started over 24 hours after the bite    Protocols used: TICK BITE-A-      "

## 2019-05-17 ENCOUNTER — OFFICE VISIT (OUTPATIENT)
Dept: FAMILY MEDICINE | Facility: CLINIC | Age: 74
End: 2019-05-17
Payer: COMMERCIAL

## 2019-05-17 VITALS
DIASTOLIC BLOOD PRESSURE: 78 MMHG | HEART RATE: 94 BPM | HEIGHT: 70 IN | WEIGHT: 232 LBS | TEMPERATURE: 98.7 F | BODY MASS INDEX: 33.21 KG/M2 | SYSTOLIC BLOOD PRESSURE: 127 MMHG | OXYGEN SATURATION: 95 %

## 2019-05-17 DIAGNOSIS — I10 HYPERTENSION GOAL BP (BLOOD PRESSURE) < 140/90: Primary | ICD-10-CM

## 2019-05-17 DIAGNOSIS — A69.20 LYME DISEASE: ICD-10-CM

## 2019-05-17 PROCEDURE — 99214 OFFICE O/P EST MOD 30 MIN: CPT | Performed by: FAMILY MEDICINE

## 2019-05-17 RX ORDER — DOXYCYCLINE 100 MG/1
100 CAPSULE ORAL 2 TIMES DAILY
Qty: 14 CAPSULE | Refills: 0 | Status: SHIPPED | OUTPATIENT
Start: 2019-05-17 | End: 2020-06-10

## 2019-05-17 ASSESSMENT — MIFFLIN-ST. JEOR: SCORE: 1812.57

## 2019-05-17 NOTE — PROGRESS NOTES
HPI:    Rm is a 73 year old male, usually followed by Dr. Lopez, here to discuss:    He prefers to have his visits around May and Nov.    Lyme disease - he believes he was bit by a tick on the right proximal thigh, although he did not see a tick. He was around his yard where deer frequent. There is an expanding rash. He denies fevers, muscle aches or other symptoms.  Evaluation and treatment:    He was seen 5/16/19 and placed on Doxy x 7 days.   I believe he has EM and asked him to do one more week of Doxy.    HTN, leg edema along with stasis dermatitis - not checking BP at home. Controlled in clinic. He has edema on both legs below the knees, most pronounced around the ankles. Present since 2014. He has baseline exertional shortness of breath which has not changed recently. There is no cough or fatigue. No chest pain, PND, orthopne. No sleep apnea symptoms like snoring, witnessed apnea, day time somnolence. BMI is elevated. No known history of varicose veins. No use of medications like dihydropyridine calcium channel blockers.   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   Lasix 20 mg every day without potassium supplementation - no side effects.   Lisinopril stopped previously due to cough.   Losartan 50 mg qd - no side effects.   Metoprolol 50 mg bid - no side effects   Wears compression stockings.   Previously BNP normal.   The patient is advised to lose weight via diet and exercise. Also avoid excessive salt.     Continue same tx.    BP Readings from Last 6 Encounters:   05/17/19 127/78   05/16/19 136/84   09/18/18 133/87   06/19/18 128/80   06/06/18 130/80   01/12/18 124/78       Wt Readings from Last 5 Encounters:   05/17/19 105.2 kg (232 lb)   05/16/19 106.6 kg (235 lb)   09/18/18 115.8 kg (255 lb 6.4 oz)   06/19/18 118.5 kg (261 lb 3.2 oz)   06/06/18 119.3 kg (263 lb)     Last Comprehensive Metabolic Panel:  Sodium   Date Value Ref Range Status    05/10/2019 140 133 - 144 mmol/L Final     Potassium   Date Value Ref Range Status   05/10/2019 4.1 3.4 - 5.3 mmol/L Final     Chloride   Date Value Ref Range Status   05/10/2019 104 94 - 109 mmol/L Final     Carbon Dioxide   Date Value Ref Range Status   05/10/2019 31 20 - 32 mmol/L Final     Anion Gap   Date Value Ref Range Status   05/10/2019 5 3 - 14 mmol/L Final     Glucose   Date Value Ref Range Status   05/10/2019 101 (H) 70 - 99 mg/dL Final     Comment:     Fasting specimen     Urea Nitrogen   Date Value Ref Range Status   05/10/2019 21 7 - 30 mg/dL Final     Creatinine   Date Value Ref Range Status   05/10/2019 0.97 0.66 - 1.25 mg/dL Final     GFR Estimate   Date Value Ref Range Status   05/10/2019 77 >60 mL/min/[1.73_m2] Final     Comment:     Non  GFR Calc  Starting 12/18/2018, serum creatinine based estimated GFR (eGFR) will be   calculated using the Chronic Kidney Disease Epidemiology Collaboration   (CKD-EPI) equation.       Calcium   Date Value Ref Range Status   05/10/2019 9.0 8.5 - 10.1 mg/dL Final     Paralyzed left hemidiaphragm - asymptomatic.    Obesity - he has lost weight via diet and exercise.  Evaluation and treatment:    I commended him and asked him to keep losing.    Body mass index is 33.05 kg/m .  Wt Readings from Last 5 Encounters:   05/17/19 105.2 kg (232 lb)   05/16/19 106.6 kg (235 lb)   09/18/18 115.8 kg (255 lb 6.4 oz)   06/19/18 118.5 kg (261 lb 3.2 oz)   06/06/18 119.3 kg (263 lb)         Dyslipidemia - not discussed today.    Preventive - advised to get Prevnar and Shingrix from our pharmacy - but he wants to think about it.    Immunization History   Administered Date(s) Administered     Influenza (High Dose) 3 valent vaccine 03/04/2019     TDAP Vaccine (Adacel) 09/15/2010        ROS:   Const: No weight changes.   ENT: No sore throat or ear pain.   Resp: No shortness of breath.   CV: No chest pain, dizziness or cardiac palpitations.   GI: No nausea, vomiting,  "diarrhea or constipation. Denies blood in stools or black stools.   : No dysuria, frequency or hematuria.     SH:     . Three kids. Works as . Non smoker. Etoh occ. Caffeine 5/day.    Exam:    /78 (Cuff Size: Adult Large)   Pulse 94   Temp 98.7  F (37.1  C) (Oral)   Ht 1.784 m (5' 10.25\")   Wt 105.2 kg (232 lb)   SpO2 95%   BMI 33.05 kg/m        Lungs:   CV: Heart RRR with no murmurs. No JVD, carotid bruits or leg edema.    Exam:    /78 (Cuff Size: Adult Large)   Pulse 94   Temp 98.7  F (37.1  C) (Oral)   Ht 1.784 m (5' 10.25\")   Wt 105.2 kg (232 lb)   SpO2 95%   BMI 33.05 kg/m      Gen: Elderly male in no acute distress  Eyes: Conjunctiva and sclera normal. Pupils react normally to light. No nystagmus.  Neck: No enlarged lymph nodes, thyromegally or other masses.  Lungs: Left lower lung field with almost no air movement. This is baseline for him. Otherwise clear to ascultation.   CV: Heart RRR with no murmurs. No JVD. Both legs with 1+ edema (improved from 2+) from mid legs to feet. Both legs with stasis changes. Compression stockings in place.   Skin: the medial, proximal right thigh has a shallow ulcer about 4 mm surrounded by 4 cm blanching erythema with central clearing.      Assessment and Plan - Decision Making    1. Hypertension goal BP (blood pressure) < 140/90    Per HPI      2. Lyme disease    Per HPI    - doxycycline hyclate (VIBRAMYCIN) 100 MG capsule; Take 1 capsule (100 mg) by mouth 2 times daily This is additional week.  Dispense: 14 capsule; Refill: 0      Written instructions given as follows:    Patient Instructions   1. Take one more week of Doxycycline for a total of 2 weeks.    2. Stop by our pharmacy and get the Prevnar and Shingrix vaccines.    3. See Dr. Lopez in Nov for your routine wellness visit.    4. You will run out of your meds in August - send a request to me so I can refill then until your Nov appointment.      "

## 2019-05-17 NOTE — PATIENT INSTRUCTIONS
1. Take one more week of Doxycycline for a total of 2 weeks.    2. Stop by our pharmacy and get the Prevnar and Shingrix vaccines.    3. See Dr. Lopez in Nov for your routine wellness visit.    4. You will run out of your meds in August - send a request to me so I can refill then until your Nov appointment.

## 2019-05-19 ENCOUNTER — NURSE TRIAGE (OUTPATIENT)
Dept: NURSING | Facility: CLINIC | Age: 74
End: 2019-05-19

## 2019-05-19 NOTE — TELEPHONE ENCOUNTER
"    Reason for Disposition    [1] Sinus pain of forehead AND [2] yellow or green nasal discharge    Additional Information    Negative: Difficult to awaken or acting confused (e.g., disoriented, slurred speech)    Negative: [1] Weakness of the face, arm or leg on one side of the body AND [2] new onset    Negative: [1] Numbness of the face, arm or leg on one side of the body AND [2] new onset    Negative: [1] Loss of speech or garbled speech AND [2] new onset    Negative: Passed out (i.e., lost consciousness, collapsed and was not responding)    Negative: Sounds like a life-threatening emergency to the triager    Negative: Followed a head injury within last 3 days    Negative: Pregnant    Negative: Traumatic Brain Injury (TBI) is suspected    Negative: Unable to walk, or can only walk with assistance (e.g., requires support)    Negative: Stiff neck (can't touch chin to chest)    Negative: Severe pain in one eye    Negative: [1] Other family members (or roommates) with headaches AND [2] possibility of carbon monoxide exposure    Negative: [1] SEVERE headache (e.g., excruciating) AND [2] \"worst headache\" of life    Negative: [1] SEVERE headache AND [2] sudden-onset (i.e., reaching maximum intensity within seconds)    Negative: [1] SEVERE headache AND [2] fever    Negative: Loss of vision or double vision (Exception: same as prior migraines)    Negative: [1] Fever > 100.0 F (37.8 C) AND [2] diabetes mellitus or weak immune system (e.g., HIV positive, cancer chemo, splenectomy, chronic steroids)    Negative: Patient sounds very sick or weak to the triager    Negative: [1] SEVERE headache (e.g., excruciating) AND [2] not improved after 2 hours of pain medicine    Negative: [1] Vomiting AND [2] 2 or more times (Exception: similar to previous migraines)    Negative: Fever > 104 F (40 C)    Negative: [1] MODERATE headache (e.g., interferes with normal activities) AND [2] present > 24 hours AND [3] unexplained  (Exceptions: " analgesics not tried, typical migraine, or headache part of viral illness)    Negative: [1] New headache AND [2] weak immune system (e.g., HIV positive, cancer chemo, splenectomy, organ transplant, chronic steroids)    Negative: [1] New headache AND [2] age > 50    Protocols used: HEADACHE-A-AH    Patient reports that he received a tick bite and was prescribed doxycycline.  He reports that he has noticed that he has a mild headache since yesterday afternoon.  Writer was able to verify that headache can be an adverse affect as indicated by micromedex reference.  Writer advised that patient be seen by a provider within 24 hours per guideline.  Patient plans to monitor his symptoms overnight and will call his provider in the morning to set up an appointment if he has had no improvement.      Sandi Arenas RN  [facility]

## 2019-05-20 ENCOUNTER — MYC MEDICAL ADVICE (OUTPATIENT)
Dept: FAMILY MEDICINE | Facility: CLINIC | Age: 74
End: 2019-05-20

## 2019-07-11 DIAGNOSIS — E78.5 HYPERLIPIDEMIA LDL GOAL <130: ICD-10-CM

## 2019-07-11 RX ORDER — SIMVASTATIN 10 MG
10 TABLET ORAL AT BEDTIME
Qty: 90 TABLET | Refills: 0 | Status: SHIPPED | OUTPATIENT
Start: 2019-07-11 | End: 2019-10-02

## 2019-09-09 ENCOUNTER — HOSPITAL ENCOUNTER (OUTPATIENT)
Facility: AMBULATORY SURGERY CENTER | Age: 74
Discharge: HOME | End: 2019-09-09
Attending: SURGERY | Admitting: SURGERY
Payer: COMMERCIAL

## 2019-09-09 VITALS
OXYGEN SATURATION: 95 % | SYSTOLIC BLOOD PRESSURE: 113 MMHG | RESPIRATION RATE: 16 BRPM | TEMPERATURE: 98 F | DIASTOLIC BLOOD PRESSURE: 77 MMHG

## 2019-09-09 LAB — COLONOSCOPY: NORMAL

## 2019-09-09 PROCEDURE — 45385 COLONOSCOPY W/LESION REMOVAL: CPT | Mod: PT | Performed by: SURGERY

## 2019-09-09 PROCEDURE — G8918 PT W/O PREOP ORDER IV AB PRO: HCPCS

## 2019-09-09 PROCEDURE — G8907 PT DOC NO EVENTS ON DISCHARG: HCPCS

## 2019-09-09 PROCEDURE — 88305 TISSUE EXAM BY PATHOLOGIST: CPT | Performed by: SURGERY

## 2019-09-09 PROCEDURE — G0500 MOD SEDAT ENDO SERVICE >5YRS: HCPCS | Performed by: SURGERY

## 2019-09-09 PROCEDURE — 45385 COLONOSCOPY W/LESION REMOVAL: CPT

## 2019-09-09 RX ORDER — FLUMAZENIL 0.1 MG/ML
0.2 INJECTION, SOLUTION INTRAVENOUS
Status: SHIPPED | OUTPATIENT
Start: 2019-09-09 | End: 2019-09-09

## 2019-09-09 RX ORDER — DIPHENHYDRAMINE HYDROCHLORIDE 50 MG/ML
INJECTION INTRAMUSCULAR; INTRAVENOUS PRN
Status: DISCONTINUED | OUTPATIENT
Start: 2019-09-09 | End: 2019-09-09 | Stop reason: HOSPADM

## 2019-09-09 RX ORDER — LIDOCAINE 40 MG/G
CREAM TOPICAL
Status: DISCONTINUED | OUTPATIENT
Start: 2019-09-09 | End: 2019-09-10 | Stop reason: HOSPADM

## 2019-09-09 RX ORDER — NALOXONE HYDROCHLORIDE 0.4 MG/ML
.1-.4 INJECTION, SOLUTION INTRAMUSCULAR; INTRAVENOUS; SUBCUTANEOUS
Status: DISCONTINUED | OUTPATIENT
Start: 2019-09-09 | End: 2019-09-10 | Stop reason: HOSPADM

## 2019-09-09 RX ORDER — ONDANSETRON 2 MG/ML
4 INJECTION INTRAMUSCULAR; INTRAVENOUS EVERY 6 HOURS PRN
Status: DISCONTINUED | OUTPATIENT
Start: 2019-09-09 | End: 2019-09-10 | Stop reason: HOSPADM

## 2019-09-09 RX ORDER — ONDANSETRON 4 MG/1
4 TABLET, ORALLY DISINTEGRATING ORAL EVERY 6 HOURS PRN
Status: DISCONTINUED | OUTPATIENT
Start: 2019-09-09 | End: 2019-09-10 | Stop reason: HOSPADM

## 2019-09-09 RX ORDER — FENTANYL CITRATE 50 UG/ML
INJECTION, SOLUTION INTRAMUSCULAR; INTRAVENOUS PRN
Status: DISCONTINUED | OUTPATIENT
Start: 2019-09-09 | End: 2019-09-09 | Stop reason: HOSPADM

## 2019-09-11 LAB — COPATH REPORT: NORMAL

## 2019-10-02 DIAGNOSIS — E78.5 HYPERLIPIDEMIA LDL GOAL <130: ICD-10-CM

## 2019-10-02 RX ORDER — SIMVASTATIN 10 MG
TABLET ORAL
Qty: 90 TABLET | Refills: 0 | Status: SHIPPED | OUTPATIENT
Start: 2019-10-02 | End: 2020-06-10 | Stop reason: ALTCHOICE

## 2019-10-02 NOTE — TELEPHONE ENCOUNTER
Patient is overdue for OV for lipids.     No pending appointments at this time.    Per RN protocol, can only give 30 day supply.   Patient normally receives 90 day supply through a mail order pharmacy so they will not accept a giovanna refill of #30 .     To provider to advise.    Ching ROBBINSN, RN

## 2019-10-02 NOTE — LETTER
October 3, 2019    Rm Maldonado  [id number] [facility] [address]    Dear Rm,       We recently received a refill request for Simvastatin (ZOCOR).  We have refilled this for a one time supply only because you are due for a:    Wellness Exam office visit and Fasting lab appointment      Please schedule this lab appointment 4-5 days prior to the office visit.     Please call at your earliest convenience so that there will not be a delay with your future refills.          Thank you,   Your [facility] Team/mkr  [phone]

## 2019-10-18 ENCOUNTER — TELEPHONE (OUTPATIENT)
Dept: FAMILY MEDICINE | Facility: CLINIC | Age: 74
End: 2019-10-18

## 2019-10-18 NOTE — TELEPHONE ENCOUNTER
"Patient states that he would like orders for lipid panel put in and frustrated that he cannot get in for physical as you limit to two in am and two in pm.  Patient ended up just saying \"nevermind\" and hanging up.      "

## 2019-10-21 ENCOUNTER — MYC MEDICAL ADVICE (OUTPATIENT)
Dept: FAMILY MEDICINE | Facility: CLINIC | Age: 74
End: 2019-10-21

## 2019-10-21 DIAGNOSIS — I10 HYPERTENSION GOAL BP (BLOOD PRESSURE) < 150/90: Primary | ICD-10-CM

## 2019-10-21 DIAGNOSIS — A69.20 LYME DISEASE: ICD-10-CM

## 2019-10-21 DIAGNOSIS — E78.5 HYPERLIPIDEMIA LDL GOAL <130: ICD-10-CM

## 2019-10-21 NOTE — TELEPHONE ENCOUNTER
Patient sent in my chart request will respond on that encounter. Closing this encounter.  DIANA Honeycutt

## 2019-10-22 NOTE — TELEPHONE ENCOUNTER
Please review ADD and sign Pending Pre-visit Labs in Epic. Patient requesting Lyme's testing please add what is needed. Seeing Dr Bob 11/21/19  Nataly ORTIZ

## 2019-11-12 DIAGNOSIS — A69.20 LYME DISEASE: ICD-10-CM

## 2019-11-12 DIAGNOSIS — E78.5 HYPERLIPIDEMIA LDL GOAL <130: ICD-10-CM

## 2019-11-12 DIAGNOSIS — I10 HYPERTENSION GOAL BP (BLOOD PRESSURE) < 150/90: ICD-10-CM

## 2019-11-12 LAB
ANION GAP SERPL CALCULATED.3IONS-SCNC: 1 MMOL/L (ref 3–14)
B BURGDOR IGG+IGM SER QL: 0.23 (ref 0–0.89)
BUN SERPL-MCNC: 19 MG/DL (ref 7–30)
CALCIUM SERPL-MCNC: 9.3 MG/DL (ref 8.5–10.1)
CHLORIDE SERPL-SCNC: 106 MMOL/L (ref 94–109)
CHOLEST SERPL-MCNC: 215 MG/DL
CO2 SERPL-SCNC: 33 MMOL/L (ref 20–32)
CREAT SERPL-MCNC: 0.78 MG/DL (ref 0.66–1.25)
GFR SERPL CREATININE-BSD FRML MDRD: 89 ML/MIN/{1.73_M2}
GLUCOSE SERPL-MCNC: 89 MG/DL (ref 70–99)
HDLC SERPL-MCNC: 67 MG/DL
LDLC SERPL CALC-MCNC: 131 MG/DL
NONHDLC SERPL-MCNC: 148 MG/DL
POTASSIUM SERPL-SCNC: 4.2 MMOL/L (ref 3.4–5.3)
SODIUM SERPL-SCNC: 140 MMOL/L (ref 133–144)
TRIGL SERPL-MCNC: 83 MG/DL

## 2019-11-12 PROCEDURE — 80061 LIPID PANEL: CPT | Performed by: FAMILY MEDICINE

## 2019-11-12 PROCEDURE — 86618 LYME DISEASE ANTIBODY: CPT | Performed by: FAMILY MEDICINE

## 2019-11-12 PROCEDURE — 80048 BASIC METABOLIC PNL TOTAL CA: CPT | Performed by: FAMILY MEDICINE

## 2019-11-12 PROCEDURE — 36415 COLL VENOUS BLD VENIPUNCTURE: CPT | Performed by: FAMILY MEDICINE

## 2019-11-21 ENCOUNTER — OFFICE VISIT (OUTPATIENT)
Dept: FAMILY MEDICINE | Facility: CLINIC | Age: 74
End: 2019-11-21
Payer: COMMERCIAL

## 2019-11-21 VITALS
OXYGEN SATURATION: 99 % | WEIGHT: 241 LBS | RESPIRATION RATE: 20 BRPM | HEIGHT: 70 IN | HEART RATE: 104 BPM | SYSTOLIC BLOOD PRESSURE: 127 MMHG | TEMPERATURE: 98.1 F | BODY MASS INDEX: 34.5 KG/M2 | DIASTOLIC BLOOD PRESSURE: 76 MMHG

## 2019-11-21 DIAGNOSIS — I65.23 CAROTID ATHEROSCLEROSIS, BILATERAL: ICD-10-CM

## 2019-11-21 DIAGNOSIS — I10 HYPERTENSION GOAL BP (BLOOD PRESSURE) < 150/90: ICD-10-CM

## 2019-11-21 DIAGNOSIS — Z00.00 ENCOUNTER FOR MEDICARE ANNUAL WELLNESS EXAM: Primary | ICD-10-CM

## 2019-11-21 DIAGNOSIS — I70.0 ATHEROSCLEROSIS OF ABDOMINAL AORTA (H): ICD-10-CM

## 2019-11-21 PROCEDURE — 99397 PER PM REEVAL EST PAT 65+ YR: CPT | Performed by: FAMILY MEDICINE

## 2019-11-21 RX ORDER — METOPROLOL TARTRATE 50 MG
50 TABLET ORAL 2 TIMES DAILY
Qty: 180 TABLET | Refills: 3 | Status: SHIPPED | OUTPATIENT
Start: 2019-11-21 | End: 2020-11-09

## 2019-11-21 RX ORDER — LOSARTAN POTASSIUM 50 MG/1
50 TABLET ORAL DAILY
Qty: 90 TABLET | Refills: 3 | Status: SHIPPED | OUTPATIENT
Start: 2019-11-21 | End: 2020-11-09

## 2019-11-21 RX ORDER — ATORVASTATIN CALCIUM 40 MG/1
40 TABLET, FILM COATED ORAL AT BEDTIME
Qty: 90 TABLET | Refills: 3 | Status: SHIPPED | OUTPATIENT
Start: 2019-11-21 | End: 2020-11-09

## 2019-11-21 ASSESSMENT — ENCOUNTER SYMPTOMS
DIZZINESS: 0
WEAKNESS: 0
COUGH: 0
ARTHRALGIAS: 0
NERVOUS/ANXIOUS: 0
DYSURIA: 0
ABDOMINAL PAIN: 0
FREQUENCY: 0
SHORTNESS OF BREATH: 0
DIARRHEA: 0
CONSTIPATION: 0
CHILLS: 0
EYE PAIN: 0
JOINT SWELLING: 0
FEVER: 0
SORE THROAT: 0
PARESTHESIAS: 0
HEMATOCHEZIA: 0
MYALGIAS: 0
HEMATURIA: 0
HEARTBURN: 0
NAUSEA: 0
PALPITATIONS: 0
HEADACHES: 0

## 2019-11-21 ASSESSMENT — ACTIVITIES OF DAILY LIVING (ADL): CURRENT_FUNCTION: NO ASSISTANCE NEEDED

## 2019-11-21 ASSESSMENT — MIFFLIN-ST. JEOR: SCORE: 1844.42

## 2019-11-21 ASSESSMENT — PAIN SCALES - GENERAL: PAINLEVEL: NO PAIN (0)

## 2019-11-21 NOTE — PROGRESS NOTES
"SUBJECTIVE:   Rm Maldonado is a 74 year old male who presents for Preventive Visit.      Are you in the first 12 months of your Medicare coverage?  No    Healthy Habits:     In general, how would you rate your overall health?  Good    Frequency of exercise:  None    Do you usually eat at least 4 servings of fruit and vegetables a day, include whole grains    & fiber and avoid regularly eating high fat or \"junk\" foods?  No    Taking medications regularly:  Yes    Medication side effects:  None and Other    Ability to successfully perform activities of daily living:  No assistance needed    Home Safety:  No safety concerns identified    Hearing Impairment:  Difficulty following a conversation in a noisy restaurant or crowded room, feel that people are mumbling or not speaking clearly, difficulty understanding soft or whispered speech and difficulty understanding speech on the telephone    In the past 6 months, have you been bothered by leaking of urine? Yes    In general, how would you rate your overall mental or emotional health?  Good      PHQ-2 Total Score: 0    Additional concerns today:  No    Do you feel safe in your environment? Yes    Have you ever done Advance Care Planning? (For example, a Health Directive, POLST, or a discussion with a medical provider or your loved ones about your wishes): No, advance care planning information given to patient to review.  Advanced care planning was discussed at today's visit.      Fall risk  Fallen 2 or more times in the past year?: No  Any fall with injury in the past year?: No    Cognitive Screening   1) Repeat 3 items (Leader, Season, Table)    2) Clock draw: NORMAL  3) 3 item recall: Recalls 3 objects  Results: 3 items recalled: COGNITIVE IMPAIRMENT LESS LIKELY    Mini-CogTM Copyright ANEL Johnson. Licensed by the author for use in [facility]; reprinted with permission (nick@.[facility]). All rights reserved.      Do you have sleep apnea, excessive snoring or " daytime drowsiness?: sleep pattern seems messed up.    Reviewed and updated as needed this visit by clinical staff  Tobacco  Allergies  Meds  Med Hx  Surg Hx  Fam Hx  Soc Hx        Reviewed and updated as needed this visit by Provider  Tobacco        Social History     Tobacco Use     Smoking status: Former Smoker     Packs/day: 1.00     Years: 10.00     Pack years: 10.00     Types: Cigarettes, Pipe     Start date: 5/15/1968     Last attempt to quit: 1983     Years since quittin.2     Smokeless tobacco: Never Used   Substance Use Topics     Alcohol use: Yes     Comment: Minimal to light         Alcohol Use 2019   Prescreen: >3 drinks/day or >7 drinks/week? Not Applicable   Prescreen: >3 drinks/day or >7 drinks/week? -               Current providers sharing in care for this patient include:   Patient Care Team:  Riddhi Lopez MD as PCP - General (Family Practice)  Jatinder Giraldo MD as Assigned PCP    The following health maintenance items are reviewed in Epic and correct as of today:  Health Maintenance   Topic Date Due     ZOSTER IMMUNIZATION (1 of 2) 1996     PNEUMOCOCCAL IMMUNIZATION 65+ LOW/MEDIUM RISK (1 of 2 - PCV13) 2011     MEDICARE ANNUAL WELLNESS VISIT  2019     INFLUENZA VACCINE (1) 2019     FALL RISK ASSESSMENT  2019     DTAP/TDAP/TD IMMUNIZATION (2 - Td) 09/15/2020     BMP  2020     LIPID  2020     ADVANCE CARE PLANNING  2023     COLONOSCOPY  2024     HEPATITIS C SCREENING  Completed     PHQ-2  Completed     AORTIC ANEURYSM SCREENING (SYSTEM ASSIGNED)  Completed     IPV IMMUNIZATION  Aged Out     MENINGITIS IMMUNIZATION  Aged Out           Review of Systems   Constitutional: Negative for chills and fever.   HENT: Positive for hearing loss. Negative for congestion, ear pain and sore throat.    Eyes: Negative for pain and visual disturbance.   Respiratory: Negative for cough and shortness of breath.    Cardiovascular:  "Negative for chest pain, palpitations and peripheral edema.   Gastrointestinal: Negative for abdominal pain, constipation, diarrhea, heartburn, hematochezia and nausea.   Genitourinary: Positive for urgency. Negative for discharge, dysuria, frequency, genital sores, hematuria and impotence.   Musculoskeletal: Negative for arthralgias, joint swelling and myalgias.   Skin: Negative for rash.   Neurological: Negative for dizziness, weakness, headaches and paresthesias.   Psychiatric/Behavioral: Negative for mood changes. The patient is not nervous/anxious.          OBJECTIVE:   /76   Pulse 104   Temp 98.1  F (36.7  C) (Oral)   Resp 20   Ht 1.778 m (5' 10\")   Wt 109.3 kg (241 lb)   SpO2 99%   BMI 34.58 kg/m   Estimated body mass index is 34.58 kg/m  as calculated from the following:    Height as of this encounter: 1.778 m (5' 10\").    Weight as of this encounter: 109.3 kg (241 lb).  Physical Exam      Diagnostic Test Results:  Labs reviewed in Epic    ASSESSMENT / PLAN:       ICD-10-CM    1. Encounter for Medicare annual wellness exam Z00.00    2. Hypertension goal BP (blood pressure) < 150/90 I10 losartan (COZAAR) 50 MG tablet     metoprolol tartrate (LOPRESSOR) 50 MG tablet   3. Atherosclerosis of abdominal aorta (H) I70.0 atorvastatin (LIPITOR) 40 MG tablet   4. Carotid atherosclerosis, bilateral I65.23 atorvastatin (LIPITOR) 40 MG tablet       COUNSELING:  Reviewed preventive health counseling, as reflected in patient instructions       Regular exercise       Healthy diet/nutrition       Vision screening       Dental care       Immunizations    Declined: Influenza, Pneumococcal and Zoster due to Concerns about side effects/safety               Colon cancer screening       Osteoporosis Prevention/Bone Health    Estimated body mass index is 34.58 kg/m  as calculated from the following:    Height as of this encounter: 1.778 m (5' 10\").    Weight as of this encounter: 109.3 kg (241 lb).    Weight management " plan: Discussed healthy diet and exercise guidelines     reports that he quit smoking about 36 years ago. His smoking use included cigarettes and pipe. He started smoking about 51 years ago. He has a 10.00 pack-year smoking history. He has never used smokeless tobacco.      Appropriate preventive services were discussed with this patient, including applicable screening as appropriate for cardiovascular disease, diabetes, osteopenia/osteoporosis, and glaucoma.  As appropriate for age/gender, discussed screening for colorectal cancer, prostate cancer, breast cancer, and cervical cancer. Checklist reviewing preventive services available has been given to the patient.    Reviewed patients plan of care and provided an AVS. The Basic Care Plan (routine screening as documented in Health Maintenance) for Rm meets the Care Plan requirement. This Care Plan has been established and reviewed with the Patient.    Counseling Resources:  ATP IV Guidelines  Pooled Cohorts Equation Calculator  Breast Cancer Risk Calculator  FRAX Risk Assessment  ICSI Preventive Guidelines  Dietary Guidelines for Americans, 2010  [employer] MyPlate  ASA Prophylaxis  Lung CA Screening    Jeison Bob MD  [facility]    Identified Health Risks:  --------------------------------------------------------------------------------------------------------------------------------------  SUBJECTIVE:  Rm Maldonado is a 74 year old male who presents to the clinic today for a routine physical exam.    The patient's last physical was 17  months ago.     Cholesterol   Date Value Ref Range Status   11/12/2019 215 (H) <200 mg/dL Final     Comment:     Desirable:       <200 mg/dl   08/30/2018 199 <200 mg/dL Final     HDL Cholesterol   Date Value Ref Range Status   11/12/2019 67 >39 mg/dL Final   08/30/2018 56 >39 mg/dL Final     LDL Cholesterol Calculated   Date Value Ref Range Status   11/12/2019 131 (H) <100 mg/dL Final     Comment:     Above  desirable:  100-129 mg/dl  Borderline High:  130-159 mg/dL  High:             160-189 mg/dL  Very high:       >189 mg/dl     08/30/2018 119 (H) <100 mg/dL Final     Comment:     Above desirable:  100-129 mg/dl  Borderline High:  130-159 mg/dL  High:             160-189 mg/dL  Very high:       >189 mg/dl       Triglycerides   Date Value Ref Range Status   11/12/2019 83 <150 mg/dL Final     Comment:     Fasting specimen   08/30/2018 118 <150 mg/dL Final     Comment:     Fasting specimen     Cholesterol/HDL Ratio   Date Value Ref Range Status   10/26/2015 3.6 0.0 - 5.0 Final   05/10/2015 2.9 0.0 - 5.0 Final     The patient's last fasting lipid panel was done 1 weeks ago and the results are listed above.        The 10-year ASCVD risk score (Reuben SIM Jr., et al., 2013) is: 23.1%    Values used to calculate the score:      Age: 74 years      Sex: Male      Is Non- : No      Diabetic: No      Tobacco smoker: No      Systolic Blood Pressure: 127 mmHg      Is BP treated: Yes      HDL Cholesterol: 67 mg/dL      Total Cholesterol: 215 mg/dL    Risk Enhancers:  Family history of premature ASCVD  LDL >159  Chronic kidney disease   Metabolic Syndrome  Inflammatory conditions (RA, psoriasis, HIV)  SE  Ancestry  Triglycerides >174      The patient reports that he has been treated for high blood pressure.    The patient reports that he does take a daily aspirin. He would like to continue(s) taking it for now despite the latest recommendations to stop at age  71.      Lab Results   Component Value Date    HCVAB  04/27/2016     Nonreactive   Assay performance characteristics have not been established for newborns,   infants, and children       The patient reports that he has been screened for Hepatitis C    (Screen all baby boomers once per CDC-- the generation born from 1945 through 1965 and per USPTF screen age 19 to 79 especially younger people who have used IV drugs)  He would not like to have an  Hepatitis C test today        Immunization History   Administered Date(s) Administered     Influenza (High Dose) 3 valent vaccine 03/04/2019     TDAP Vaccine (Adacel) 09/15/2010     The patient's believes that his last tetanus shot was given 9 year(s) ago.   The patient believes that he has not had a Shingrix in the past  The patient believes that he has not had a PPSV23 in the past.  The patient believes that he has not had a PCV13 in the past.  The patient believes that he has not had a seasonal flu vaccination this fall or winter.  The patient would like to have a no vaccinations today      Results for orders placed or performed during the hospital encounter of 09/09/19   COLONOSCOPY   Result Value Ref Range    COLONOSCOPY       [facility]  Endoscopy Department-[place]  _______________________________________________________________________________  Patient Name: Rm Maldonado            Procedure Date: 9/9/2019 9:28 AM  MRN: [id number]                       YOB: 1945  Admit Type: Outpatient                Age: 74  Gender: Male                          Note Status: Finalized  Attending MD: Ariel Manzanares MD     Instrument Name: [id number]  _______________________________________________________________________________     Procedure:                Colonoscopy  Indications:              Personal history of colonic polyps  Providers:                Ariel Manzanares MD, Stefany Arce  Referring MD:             Riddhi Lopez MD  Medicines:                Fentanyl 150 micrograms IV, Midazolam 3 mg IV,                             Diphenhydramine 12.5 mg IV  Complications:            No immediate complications.  ______________________________________________ _________________________________  Procedure:                Pre-Anesthesia Assessment:                            - Prior to the procedure, a History and Physical                             was performed,  and patient medications and                             allergies were reviewed. The risks and benefits of                             the procedure and the sedation options and risks                             were discussed with the patient. All questions were                             answered and informed consent was obtained. Patient                             identification and proposed procedure were verified                             by the physician in the pre-procedure area. Mental                             Status Examination: alert and oriented. Airway                             Examination: normal oropharyngeal airway and neck                             mobility. Respiratory Examination: clear to                             auscultation. CV Examination: normal.  Prophylactic                             Antibiotics: The patient does not require                             prophylactic antibiotics. Prior Anticoagulants: The                             patient has taken aspirin, last dose was 7 days                             prior to procedure. ASA Grade Assessment: II - A                             patient with mild systemic disease. After reviewing                             the risks and benefits, the patient was deemed in                             satisfactory condition to undergo the procedure.                             The anesthesia plan was to use moderate sedation /                             analgesia (conscious sedation). This assessment was                             completed before the administration of sedation at                             09:25 AM.                            After obtaining informed consent, the colonoscope                             was passed under direct vision. Throughout the                              procedure, the patient's blood pressure, pulse, and                             oxygen saturations were monitored continuously. The                              Colonoscope was introduced through the anus and                             advanced to the cecum, identified by appendiceal                             orifice and ileocecal valve. The colonoscopy was                             unusually difficult due to a redundant colon,                             significant looping and a tortuous colon. The                             patient tolerated the procedure well. The quality                             of the bowel preparation was good.                                                                                   Findings:       The perianal and digital rectal examinations were normal.       Multiple small and large-mouthed diverticula were found in the sigmoid        colon.       A 2 mm polyp was found in the proximal transverse colon. The polyp was        sessile. T he polyp was removed with a cold snare. Resection and        retrieval were complete.       The exam was otherwise without abnormality.                                                                                   Moderate Sedation:       Moderate (conscious) sedation was administered by the endoscopy nurse        and supervised by the endoscopist. The following parameters were        monitored: oxygen saturation, heart rate, blood pressure, and response        to care. Total physician intraservice time was 33 minutes.  Impression:               - Diverticulosis in the sigmoid colon.                            - One 2 mm polyp in the proximal transverse colon,                             removed with a cold snare. Resected and retrieved.                            - The examination was otherwise normal.  Recommendation:           - Path report will tell when next colonoscopy                             should be.                            For the diverticulosis will need to start  on                             Metamucil or its equivalent for more details look                              at patient instructions.                            no evidnece of the polyp at the area across from                             the ileocecal valve.                            5 years if other polyp is just adenomatous. sooner                             like 3 if more.                                                                                     ___________________  Ariel Manzanares MD  9/9/2019 10:26:21 AM  I was physically present for the entire viewing portion of the exam.Ariel Manzanares MD  Number of Addenda: 0    Note Initiated On: 9/9/2019 9:28 AM  Scope In:  Scope Out:     ]   The patient denies a family history of colon cancer.  The patient reports that he has had a colonoscopy. His  last colonoscopy was in September 2019  and he  report that is was abnormal. The patient was told to have this repeated in 3-5 years.    The patient reports that he and his wife or partner are not using contraception as she has gone through menopause.   He is not interested in a vasectomy in the near future.  The patient reports a family history of diabetes in his mother.    The patient reports that he eats or drinks 1 servings of dairy products per day. He does take a 600 calcium supplement once daily.  The patient reports that he has dental appointments approximately every 3 years.   The patient reports that he  has an eye examination approximately every 1.0 year(s).    Do you currently smoke? No, quit in 1983  How many years have you smoked?    How many packs per day did you smoke on average? N/A  (if more than 30 pack year history and the patient is age 55-80 consider ordering an annual low dose radiation lung CT to screen for cancer)  (Do not order if patient has quit more than 15 years ago or has a health condition that limits life expectancy or could not tolerate curative lung surgery)  Are you interested having a lung CT to screen for lung cancer? N/A    If the patient has smoked more that 100  cigarettes, has the patient had an imaging study (US or CT) for an AAA between the ages of 65 and 75? Yes: Abdominal CT            Patient Active Problem List   Diagnosis     Exogenous obesity     Hypertension goal BP (blood pressure) < 150/90     Eczema     Advanced directives, counseling/discussion     Vitamin D deficiency     Colon polyp     Paralyzed hemidiaphragm     Hyperlipidemia LDL goal <130     Venous stasis dermatitis of left lower extremity     Bilateral leg edema     Obesity (BMI 35.0-39.9) with comorbidity (H)       Past Surgical History:   Procedure Laterality Date     ABDOMEN SURGERY       BIOPSY       COLONOSCOPY       COLONOSCOPY WITH CO2 INSUFFLATION N/A 2016    Procedure: COLONOSCOPY WITH CO2 INSUFFLATION;  Surgeon: Ariel Manzanares MD;  Location: [facility] OR     COLONOSCOPY WITH CO2 INSUFFLATION N/A 2019    Procedure: COLONOSCOPY, WITH CO2 INSUFFLATION;  Surgeon: Ariel Manzanares MD;  Location: [facility] OR     EYE SURGERY      left cataract      HERNIA REPAIR       HERNIA REPAIR       TONSILLECTOMY         Family History   Problem Relation Age of Onset     C.A.D. Mother      Heart Disease Mother      Diabetes Mother      Obesity Mother      Alcohol/Drug Father      Obesity Daughter        Social History     Socioeconomic History     Marital status:      Spouse name: Not on file     Number of children: Not on file     Years of education: Not on file     Highest education level: Not on file   Occupational History     Not on file   Social Needs     Financial resource strain: Not on file     Food insecurity:     Worry: Not on file     Inability: Not on file     Transportation needs:     Medical: Not on file     Non-medical: Not on file   Tobacco Use     Smoking status: Former Smoker     Packs/day: 1.00     Years: 10.00     Pack years: 10.00     Types: Cigarettes, Pipe     Start date: 5/15/1968     Last attempt to quit: 1983     Years since quittin.2     Smokeless tobacco:  Never Used   Substance and Sexual Activity     Alcohol use: Yes     Comment: Minimal to light     Drug use: No     Sexual activity: Yes     Partners: Female   Lifestyle     Physical activity:     Days per week: Not on file     Minutes per session: Not on file     Stress: Not on file   Relationships     Social connections:     Talks on phone: Not on file     Gets together: Not on file     Attends Gnosticist service: Not on file     Active member of club or organization: Not on file     Attends meetings of clubs or organizations: Not on file     Relationship status: Not on file     Intimate partner violence:     Fear of current or ex partner: Not on file     Emotionally abused: Not on file     Physically abused: Not on file     Forced sexual activity: Not on file   Other Topics Concern     Parent/sibling w/ CABG, MI or angioplasty before 65F 55M? No   Social History Narrative     Not on file       Current Outpatient Medications   Medication Sig Dispense Refill     Ascorbic Acid (VITAMIN C PO) Take 2,000 mg by mouth.       aspirin 325 MG EC tablet Take 325 mg by mouth daily.       calcium carbonate (OS-GENE 500 MG [place]. CA) 500 MG tablet Take 1,000 mg by mouth daily       doxycycline hyclate (VIBRAMYCIN) 100 MG capsule Take 1 capsule (100 mg) by mouth 2 times daily This is additional week. 14 capsule 0     FUROSEMIDE PO Take 20 mg by mouth daily       lecithin 1200 MG CAPS Take 400 mg by mouth daily        losartan (COZAAR) 50 MG tablet TAKE 1 TABLET (50 MG) BY MOUTH DAILY 90 tablet 1     metoprolol tartrate (LOPRESSOR) 50 MG tablet TAKE 1 TABLET TWICE DAILY 180 tablet 1     Omega-3 Fatty Acids (FISH OIL) 1200 MG capsule Take 2 capsules by mouth daily        simvastatin (ZOCOR) 10 MG tablet TAKE 1 TABLET AT BEDTIME 90 tablet 0     VITAMIN D, CHOLECALCIFEROL, PO Take 5,000 Units by mouth daily       vitamin E 400 UNITS TABS Take 400 Units by mouth daily         Review Of Systems    Genitourinary: nocturia x 1 at 4 am and  "only occasionally       PHYSICAL EXAMINATION:  Blood pressure 127/76, pulse 104, temperature 98.1  F (36.7  C), temperature source Oral, resp. rate 20, height 1.778 m (5' 10\"), weight 109.3 kg (241 lb), SpO2 99 %.  General appearance - healthy, alert and no distress  Skin - Skin color, texture, turgor normal. No rashes or lesions.  Head - Normocephalic. No masses, lesions, tenderness or abnormalities  Eyes - conjunctivae/corneas clear. PERRL, EOM's intact. Fundi benign  Ears - External ears normal. Canals clear. TM's normal.  Nose/Sinuses - Nares normal. Septum midline. Mucosa normal. No drainage or sinus tenderness.  Oropharynx - Lips, mucosa, and tongue normal. Teeth and gums normal.  Neck - Neck supple. No adenopathy. Thyroid symmetric, normal size,  Lungs - Percussion normal. Good diaphragmatic excursion. Lungs clear  Heart - PMI normal. No lifts, heaves, or thrills. RRR. No murmurs, clicks gallops or rub  Abdomen - Abdomen soft, non-tender. BS normal. No masses, organomegaly  Extremities - Extremities normal. No deformities, edema, or skin discoloration.  Musculoskeletal - Spine ROM normal. Muscular strength intact.  Peripheral pulses - radial=4/4, femoral=4/4, popliteal=4/4, dorsalis pedis=4/4,  Neuro - Gait normal. Reflexes normal and symmetric. Sensation grossly WNL.  Genitalia - Penis normal. No urethral discharge. Scrotum normal to palpation. No hernia.  Rectal - positive findings: prostate 1+      Orders Only on 11/12/2019   Component Date Value Ref Range Status     Lyme Disease Antibodies Serum 11/12/2019 0.23  0.00 - 0.89 Final    Comment: Negative, Absence of detectable Borrelia burdorferi antibodies. A negative   result does not exclude the possibility of Borrelia burgdorferi infection. If   early Lyme disease is suspected, a second sample should be collected and   tested 2 to 4 weeks later.       Sodium 11/12/2019 140  133 - 144 mmol/L Final     Potassium 11/12/2019 4.2  3.4 - 5.3 mmol/L Final     " Chloride 11/12/2019 106  94 - 109 mmol/L Final     Carbon Dioxide 11/12/2019 33* 20 - 32 mmol/L Final     Anion Gap 11/12/2019 1* 3 - 14 mmol/L Final     Glucose 11/12/2019 89  70 - 99 mg/dL Final    Fasting specimen     Urea Nitrogen 11/12/2019 19  7 - 30 mg/dL Final     Creatinine 11/12/2019 0.78  0.66 - 1.25 mg/dL Final     GFR Estimate 11/12/2019 89  >60 mL/min/[1.73_m2] Final    Comment: Non  GFR Calc  Starting 12/18/2018, serum creatinine based estimated GFR (eGFR) will be   calculated using the Chronic Kidney Disease Epidemiology Collaboration   (CKD-EPI) equation.       GFR Estimate If Black 11/12/2019 >90  >60 mL/min/[1.73_m2] Final    Comment:  GFR Calc  Starting 12/18/2018, serum creatinine based estimated GFR (eGFR) will be   calculated using the Chronic Kidney Disease Epidemiology Collaboration   (CKD-EPI) equation.       Calcium 11/12/2019 9.3  8.5 - 10.1 mg/dL Final     Cholesterol 11/12/2019 215* <200 mg/dL Final    Desirable:       <200 mg/dl     Triglycerides 11/12/2019 83  <150 mg/dL Final    Fasting specimen     HDL Cholesterol 11/12/2019 67  >39 mg/dL Final     LDL Cholesterol Calculated 11/12/2019 131* <100 mg/dL Final    Comment: Above desirable:  100-129 mg/dl  Borderline High:  130-159 mg/dL  High:             160-189 mg/dL  Very high:       >189 mg/dl       Non HDL Cholesterol 11/12/2019 148* <130 mg/dL Final    Comment: Above Desirable:  130-159 mg/dl  Borderline high:  160-189 mg/dl  High:             190-219 mg/dl  Very high:       >219 mg/dl         ASSESSMENT:    ICD-10-CM    1. Encounter for Medicare annual wellness exam Z00.00        Well-Adult Physical Exam.  Health Maintenance Due   Topic Date Due     ZOSTER IMMUNIZATION (1 of 2) 08/18/1996     PNEUMOCOCCAL IMMUNIZATION 65+ LOW/MEDIUM RISK (1 of 2 - PCV13) 08/18/2011     MEDICARE ANNUAL WELLNESS VISIT  06/19/2019     INFLUENZA VACCINE (1) 09/01/2019     FALL RISK ASSESSMENT  09/18/2019     Health  Maintenance   Topic Date Due     ZOSTER IMMUNIZATION (1 of 2) 08/18/1996     PNEUMOCOCCAL IMMUNIZATION 65+ LOW/MEDIUM RISK (1 of 2 - PCV13) 08/18/2011     MEDICARE ANNUAL WELLNESS VISIT  06/19/2019     INFLUENZA VACCINE (1) 09/01/2019     FALL RISK ASSESSMENT  09/18/2019     DTAP/TDAP/TD IMMUNIZATION (2 - Td) 09/15/2020     BMP  11/12/2020     LIPID  11/12/2020     ADVANCE CARE PLANNING  06/06/2023     COLONOSCOPY  09/09/2024     HEPATITIS C SCREENING  Completed     PHQ-2  Completed     AORTIC ANEURYSM SCREENING (SYSTEM ASSIGNED)  Completed     IPV IMMUNIZATION  Aged Out     MENINGITIS IMMUNIZATION  Aged Out         HEALTH CARE MAINTENENCE: The recommended screening tests and vaccinatons for this patient have been discussed as above.  The appropriate tests and vaccinations  have been ordered or declined by the patient. Please see the orders in EPIC.The patient specifically declines: influenza, Shingrix, PPSV23 and PCV13    Immunization Status:  up to date and documented except for influenza, Shingrix, PPSV23 and PCV13    Patient Active Problem List   Diagnosis     Exogenous obesity     Hypertension goal BP (blood pressure) < 150/90     Eczema     Advanced directives, counseling/discussion     Vitamin D deficiency     Colon polyp     Paralyzed hemidiaphragm     Hyperlipidemia LDL goal <130     Venous stasis dermatitis of left lower extremity     Bilateral leg edema     Obesity (BMI 35.0-39.9) with comorbidity (H)        ATP III Guidelines  ICSI Preventive Guidelines    PLAN:   The patient has some ATHEROSCLEROTIC CARDIOVASCULAR DISEASE seen on  Imaging and therefore I am recommend(ed) we change his simvastatin 10 mg to atorvastatin 40 mg at bedtime.  Shingrix recommended  PPSV23 recommended  PCV13 recommended  Flu shot recommended  Discussed calcium intake, vitamins and supplements. Recommended 1000 mg of calcium daily  Weight loss through diet and exercise was recommended  Sunscreen use was recommended especially in  the area of [facility]  Recommended dental exams every 6 months  Recommended eye exam every 1-2 years  Follow up in 1 year for the next preventative medical visit        Body mass index is 34.58 kg/m .

## 2019-11-21 NOTE — PATIENT INSTRUCTIONS
Patient Education   Personalized Prevention Plan  You are due for the preventive services outlined below.  Your care team is available to assist you in scheduling these services.  If you have already completed any of these items, please share that information with your care team to update in your medical record.  Health Maintenance Due   Topic Date Due     Zoster (Shingles) Vaccine (1 of 2) 08/18/1996     Pneumococcal Vaccine (1 of 2 - PCV13) 08/18/2011     Annual Wellness Visit  06/19/2019     Flu Vaccine (1) 09/01/2019     FALL RISK ASSESSMENT  09/18/2019             Patient Education     Preventing Osteoporosis: Meeting Your Calcium Needs    Your body needs calcium to build and repair bones. But it can't make calcium on its own. That's why it's important to eat calcium-rich foods. Some foods are naturally rich in calcium. Others have calcium added (fortified). It's best to get calcium from the foods you eat. But if you can't get enough, you may want to take calcium supplements. To meet your daily calcium needs, try the foods listed below.  Dairy Fish & beans Other sources   Source   Calcium (mg) per serving   Source   Calcium (mg) per serving   Source   Calcium (mg) per serving   Low-fat yogurt, plain   415 mg/8 oz.   Sardines, Atlantic, canned, with bones   351 mg/3 oz.   Oatmeal, instant, fortified   215 mg/1 cup   Nonfat milk   302 mg/1 cup   Edwards, sockeye, canned, with bones   239 mg/3 oz.   Tofu made with calcium sulfate   204 mg/3 oz.   Low-fat milk   297 mg/1 cup   Soybeans, fresh, boiled   131 mg/1/2 cup   Collards   179 mg/1/2 cup   Swiss cheese   272 mg/1 oz.   White beans, cooked   81 mg/1/2 cup   English muffin, whole wheat   175 mg/1 muffin   Cheddar cheese   205 mg/1 oz.   Navy beans, cooked   79 mg/1/2 cup   Kale   90 mg/1/2 cup   Ice cream strawberry   79 mg/1/2 cup           Orange, navel   56 mg/1 medium   Note: Calcium levels may vary depending on brand and size.  Daily calcium needs  14 to  18 years old: 1,300 mg  19 to 30 years old: 1,000 mg  31 to 50 years old: 1,000 mg  51 to 70 years old, women: 1,200 mg  51 to 70 years old, men: 1,000 mg  Pregnant or nursin to 18 years old: 1,300 mg, 19 to 50 years old: 1,000 mg  Older than 70 (women and men): 1,200 mg   Date Last Reviewed: 2018-2018 [email or web address]. [address] [place] [address]. All rights reserved. This information is not intended as a substitute for professional medical care. Always follow your healthcare professional's instructions.

## 2019-11-21 NOTE — NURSING NOTE
"Chief Complaint   Patient presents with     Wellness Visit     Health Maintenance     fall risk,        Initial BP (!) 151/91   Pulse 104   Temp 98.1  F (36.7  C) (Oral)   Resp 20   Ht 1.778 m (5' 10\")   Wt 109.3 kg (241 lb)   SpO2 99%   BMI 34.58 kg/m   Estimated body mass index is 34.58 kg/m  as calculated from the following:    Height as of this encounter: 1.778 m (5' 10\").    Weight as of this encounter: 109.3 kg (241 lb).  Medication Reconciliation: complete  Courtney Cordoba CMA  "

## 2019-11-26 ENCOUNTER — MYC MEDICAL ADVICE (OUTPATIENT)
Dept: FAMILY MEDICINE | Facility: CLINIC | Age: 74
End: 2019-11-26

## 2020-01-02 ENCOUNTER — TELEPHONE (OUTPATIENT)
Dept: FAMILY MEDICINE | Facility: CLINIC | Age: 75
End: 2020-01-02

## 2020-01-02 NOTE — TELEPHONE ENCOUNTER
Pt states he took his medications this morning: B12, Vit D, Omega 3, Vitamin E, metoprolol, and losartan.  He developed a bitter taste in his mouth and a burning sensation at the base of his throat, these sx lasted about 40 min.  They have resolved now.  These are not new medications and he has been taking them all for several months.    To provider to advise.  Kavitha ROBBINSN, RN

## 2020-01-02 NOTE — TELEPHONE ENCOUNTER
Pt notified of provider message as written.  Pt verbalized good understanding.  Kavitha Snider BSN, RN

## 2020-01-02 NOTE — TELEPHONE ENCOUNTER
Patient has a weird taste in mouth after taking medication this morning.     Patient has been taking medication fora  Month or two.      metoprolol tartrate (LOPRESSOR) 50 MG tablet  losartan (COZAAR) 50 MG tablet  Omega-3 Fatty Acids (FISH OIL) 1200 MG capsule  Ascorbic Acid (VITAMIN C PO)  vitamin E 400 UNITS TABS    Reason for call:  Patient reporting a symptom    Symptom or request: Patient has a weird taste in mouth after taking medication this morning.     Patient has been taking medication for a  Month or two.     Duration (how long have symptoms been present): 15 minutes    Have you been treated for this before? No    Additional comments: Patient took regular medications but this one has a weird taste in his mouth.     Phone Number patient can be reached at:  Cell number on file:    Telephone Information:   Mobile [phone]       Best Time:  Any    Can we leave a detailed message on this number:  YES    Call taken on 1/2/2020 at 10:42 AM by Kendal Stroud

## 2020-01-02 NOTE — TELEPHONE ENCOUNTER
Uncertain if related to medications.  Best guess is one got stuck and dissolved and that is the bitter taste.   If happens repeatedly would hold one of the supplements one at a time to see if that is what is causing it

## 2020-02-07 ENCOUNTER — NURSE TRIAGE (OUTPATIENT)
Dept: FAMILY MEDICINE | Facility: CLINIC | Age: 75
End: 2020-02-07

## 2020-02-07 ENCOUNTER — MYC MEDICAL ADVICE (OUTPATIENT)
Dept: FAMILY MEDICINE | Facility: CLINIC | Age: 75
End: 2020-02-07

## 2020-02-07 DIAGNOSIS — E78.5 HYPERLIPIDEMIA LDL GOAL <130: Primary | ICD-10-CM

## 2020-02-07 NOTE — TELEPHONE ENCOUNTER
Additional Information    Negative: Passed out (i.e., fainted, collapsed and was not responding)    Negative: Shock suspected (e.g., cold/pale/clammy skin, too weak to stand, low BP, rapid pulse)    Negative: Sounds like a life-threatening emergency to the triager    Negative: Major injury to the back (e.g., MVA, fall > 10 feet or 3 meters, penetrating injury, etc.)    Negative: Pain in the upper back over the ribs (rib cage) that radiates (travels) into the chest    Negative: Pain in the upper back over the ribs (rib cage) and worsened by coughing (or clearly increases with breathing)    Negative: SEVERE back pain of sudden onset and age > 60    Negative: SEVERE abdominal pain (e.g., excruciating)    Negative: Abdominal pain and age > 60    Negative: Unable to urinate (or only a few drops) and bladder feels very full    Negative: Loss of bladder or bowel control (urine or bowel incontinence; wetting self, leaking stool) of new onset    Negative: Numbness (loss of sensation) in groin or rectal area    Negative: Pain radiates into groin, scrotum    Negative: Blood in urine (red, pink, or tea-colored)    Negative: Vomiting and pain over lower ribs of back (i.e., flank - kidney area)    Negative: Weakness of a leg or foot (e.g., unable to bear weight, dragging foot)    Negative: Patient sounds very sick or weak to the triager    Negative: Fever > 100.5 F (38.1 C) and flank pain    Negative: Pain or burning with passing urine (urination)    Negative: SEVERE back pain (e.g., excruciating, unable to do any normal activities) and not improved after pain medicine and CARE ADVICE    Negative: Numbness in an arm or hand (i.e., loss of sensation) and upper back pain    Negative: Numbness in a leg or foot (i.e., loss of sensation)    Negative: High-risk adult (e.g., history of cancer, history of HIV, or history of IV drug abuse)    Negative: Painful rash with multiple small blisters grouped together (i.e., dermatomal  "distribution or 'band' or 'stripe')    Negative: Pain radiates into the thigh or further down the leg, and in both legs    Age > 50 and no history of prior similar back pain    Answer Assessment - Initial Assessment Questions  1. ONSET: \"When did the pain begin?\"       1 week ago. Mild pain  2. LOCATION: \"Where does it hurt?\" (upper, mid or lower back)      Lt back at base of rib height  3. SEVERITY: \"How bad is the pain?\"  (e.g., Scale 1-10; mild, moderate, or severe)    - MILD (1-3): doesn't interfere with normal activities     - MODERATE (4-7): interferes with normal activities or awakens from sleep     - SEVERE (8-10): excruciating pain, unable to do any normal activities       Mild 2/10  4. PATTERN: \"Is the pain constant?\" (e.g., yes, no; constant, intermittent)       Intermittent and positional. Pt states standing upright and straight worsens lt back pain. Pt states slightly dropping the lt shoulder improves the lt-sided back pain.  5. RADIATION: \"Does the pain shoot into your legs or elsewhere?\"      none  6. CAUSE:  \"What do you think is causing the back pain?\"       Unknown  7. BACK OVERUSE:  \"Any recent lifting of heavy objects, strenuous work or exercise?\"      Fixing well  8. MEDICATIONS: \"What have you taken so far for the pain?\" (e.g., nothing, acetaminophen, NSAIDS)      Ibuprofen before bed once  9. NEUROLOGIC SYMPTOMS: \"Do you have any weakness, numbness, or problems with bowel/bladder control?\"      none  10. OTHER SYMPTOMS: \"Do you have any other symptoms?\" (e.g., fever, abdominal pain, burning with urination, blood in urine)        none    Protocols used: BACK PAIN-A-OH      "

## 2020-02-07 NOTE — TELEPHONE ENCOUNTER
Routed to provider to review and advise. See pended future order.  Pt is inquiring if it would be appropriate recheck fasting lipid labs to see if any improvements after having started atorvastatin (40mg) in November.    LIANA - RN triaged back pain and had further discussion of sx by phone following pt 4:07pm XY Mobile message. See 2/7/20 Triage call regarding back pain.    Dottie Rosales, ITZELN, RN

## 2020-02-24 ENCOUNTER — HEALTH MAINTENANCE LETTER (OUTPATIENT)
Age: 75
End: 2020-02-24

## 2020-05-12 DIAGNOSIS — E78.5 HYPERLIPIDEMIA LDL GOAL <130: ICD-10-CM

## 2020-05-12 LAB
ALBUMIN SERPL-MCNC: 3.8 G/DL (ref 3.4–5)
ALP SERPL-CCNC: 75 U/L (ref 40–150)
ALT SERPL W P-5'-P-CCNC: 25 U/L (ref 0–70)
AST SERPL W P-5'-P-CCNC: 12 U/L (ref 0–45)
BILIRUB DIRECT SERPL-MCNC: 0.2 MG/DL (ref 0–0.2)
BILIRUB SERPL-MCNC: 1.2 MG/DL (ref 0.2–1.3)
CHOLEST SERPL-MCNC: 177 MG/DL
HDLC SERPL-MCNC: 65 MG/DL
LDLC SERPL CALC-MCNC: 92 MG/DL
NONHDLC SERPL-MCNC: 112 MG/DL
PROT SERPL-MCNC: 7 G/DL (ref 6.8–8.8)
TRIGL SERPL-MCNC: 98 MG/DL

## 2020-05-12 PROCEDURE — 80076 HEPATIC FUNCTION PANEL: CPT | Performed by: FAMILY MEDICINE

## 2020-05-12 PROCEDURE — 36415 COLL VENOUS BLD VENIPUNCTURE: CPT | Performed by: FAMILY MEDICINE

## 2020-05-12 PROCEDURE — 80061 LIPID PANEL: CPT | Performed by: FAMILY MEDICINE

## 2020-06-09 ENCOUNTER — MYC MEDICAL ADVICE (OUTPATIENT)
Dept: FAMILY MEDICINE | Facility: CLINIC | Age: 75
End: 2020-06-09

## 2020-06-09 NOTE — TELEPHONE ENCOUNTER
Patient asking for a face-to-face visit for wood ticks.   Ok to schedule with another provider if PCP is unavailable?    Ching ROBBINSN, RN

## 2020-06-09 NOTE — PROGRESS NOTES
Subjective     Rm Maldonado is a 74 year old male who presents to clinic today for the following health issues:    HPI   Tick bite       Duration: Noted Monday or Tuesday after being outdoors     Description (location/character/radiation): Lower left back at belt line, possible tick bite     Intensity:  n/a    Accompanying signs and symptoms: redness near bite site ,back in center     History (similar episodes/previous evaluation): history of lyme's 1 year ago     Precipitating or alleviating factors: None    Therapies tried and outcome: alcohol      Known wood tick, pt remove part of tick though feels still a part embedded.     Patient Active Problem List   Diagnosis     Exogenous obesity     Hypertension goal BP (blood pressure) < 150/90     Eczema     Advanced directives, counseling/discussion     Vitamin D deficiency     Colon polyp     Paralyzed hemidiaphragm     Hyperlipidemia LDL goal <130     Venous stasis dermatitis of left lower extremity     Bilateral leg edema     Obesity (BMI 35.0-39.9) with comorbidity (H)     Past Surgical History:   Procedure Laterality Date     ABDOMEN SURGERY       BIOPSY       COLONOSCOPY       COLONOSCOPY WITH CO2 INSUFFLATION N/A 2016    Procedure: COLONOSCOPY WITH CO2 INSUFFLATION;  Surgeon: Ariel Manzanares MD;  Location: [facility] OR     COLONOSCOPY WITH CO2 INSUFFLATION N/A 2019    Procedure: COLONOSCOPY, WITH CO2 INSUFFLATION;  Surgeon: Ariel Manzanares MD;  Location: [facility] OR     EYE SURGERY      left cataract      HERNIA REPAIR       HERNIA REPAIR       TONSILLECTOMY         Social History     Tobacco Use     Smoking status: Former Smoker     Packs/day: 1.00     Years: 10.00     Pack years: 10.00     Types: Cigarettes, Pipe     Start date: 5/15/1968     Last attempt to quit: 1983     Years since quittin.8     Smokeless tobacco: Never Used   Substance Use Topics     Alcohol use: Yes     Comment: Minimal to light     Family History   Problem  Relation Age of Onset     C.A.D. Mother      Heart Disease Mother      Diabetes Mother      Obesity Mother      Alcohol/Drug Father      Obesity Daughter          Current Outpatient Medications   Medication Sig Dispense Refill     Ascorbic Acid (VITAMIN C PO) Take 2,000 mg by mouth.       aspirin 325 MG EC tablet Take 325 mg by mouth daily.       atorvastatin (LIPITOR) 40 MG tablet Take 1 tablet (40 mg) by mouth At Bedtime 90 tablet 3     calcium carbonate (OS-GENE 500 MG [place]. [place]) 500 MG tablet Take 1,000 mg by mouth daily       lecithin 1200 MG CAPS Take 400 mg by mouth daily        losartan (COZAAR) 50 MG tablet Take 1 tablet (50 mg) by mouth daily 90 tablet 3     metoprolol tartrate (LOPRESSOR) 50 MG tablet Take 1 tablet (50 mg) by mouth 2 times daily 180 tablet 3     Omega-3 Fatty Acids (FISH OIL) 1200 MG capsule Take 2 capsules by mouth daily        VITAMIN D, CHOLECALCIFEROL, PO Take 5,000 Units by mouth daily       vitamin E 400 UNITS TABS Take 400 Units by mouth daily       BP Readings from Last 3 Encounters:   06/10/20 (!) 142/86   11/21/19 127/76   09/09/19 113/77    Wt Readings from Last 3 Encounters:   06/10/20 118.6 kg (261 lb 6.4 oz)   11/21/19 109.3 kg (241 lb)   05/17/19 105.2 kg (232 lb)                    Reviewed and updated as needed this visit by Provider  Tobacco  Allergies  Meds  Problems  Med Hx  Surg Hx  Fam Hx         Review of Systems   Constitutional, HEENT, cardiovascular, pulmonary, gi and gu systems are negative, except as otherwise noted.      Objective    BP (!) 142/86   Pulse 83   Temp 98.1  F (36.7  C) (Oral)   Resp 18   Wt 118.6 kg (261 lb 6.4 oz)   SpO2 96%   BMI 37.51 kg/m    Body mass index is 37.51 kg/m .  Physical Exam   GENERAL: healthy, alert and no distress  EYES: Eyes grossly normal to inspection, PERRL and conjunctivae and sclerae normal  MS: no gross musculoskeletal defects noted, no edema  SKIN: no suspicious lesions or rashes, + tick bite left low back in  "belt line, + tick parts remain in bite  PSYCH: mentation appears normal, affect normal/bright    Diagnostic Test Results:  Labs reviewed in Epic        Assessment & Plan     (S24.329Z) Foreign body of skin of back, initial encounter  (primary encounter diagnosis)  (S30.860A,  W57.XXXA) Tick bite of back, initial encounter  Comment: retained tick part  Plan: HC PUNCH BIOPSY OF SKIN, FIRST LESION        After informed written consent was obtained, using Betadine for cleansing and 1% Lidocaine with epinephrine for anesthetic, with sterile technique a 2 mm punch biopsy was used to remove the retained tick.  Hemostasis was obtained by pressure and silver nitrate.  Antibiotic dressing is applied, and wound care instructions provided. Be alert for any signs of cutaneous infection. The specimen was not sent to pathology for evaluation. The procedure was well tolerated without complications.         BMI:   Estimated body mass index is 37.51 kg/m  as calculated from the following:    Height as of 11/21/19: 1.778 m (5' 10\").    Weight as of this encounter: 118.6 kg (261 lb 6.4 oz).   Weight management plan: Discussed healthy diet and exercise guidelines        See Patient Instructions    Return in about 2 weeks (around 6/24/2020) for if not improved or symptoms worsen.    Riddhi Lopez MD  [facility]    "

## 2020-06-10 ENCOUNTER — OFFICE VISIT (OUTPATIENT)
Dept: FAMILY MEDICINE | Facility: CLINIC | Age: 75
End: 2020-06-10
Payer: COMMERCIAL

## 2020-06-10 VITALS
BODY MASS INDEX: 37.51 KG/M2 | SYSTOLIC BLOOD PRESSURE: 142 MMHG | HEART RATE: 83 BPM | DIASTOLIC BLOOD PRESSURE: 86 MMHG | TEMPERATURE: 98.1 F | RESPIRATION RATE: 18 BRPM | OXYGEN SATURATION: 96 % | WEIGHT: 261.4 LBS
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
## 2020-06-10 DIAGNOSIS — S30.860A TICK BITE OF BACK, INITIAL ENCOUNTER: ICD-10-CM

## 2020-06-10 DIAGNOSIS — S20.459A: Primary | ICD-10-CM

## 2020-06-10 PROCEDURE — 99213 OFFICE O/P EST LOW 20 MIN: CPT | Mod: 25 | Performed by: FAMILY MEDICINE

## 2020-06-10 PROCEDURE — 11104 PUNCH BX SKIN SINGLE LESION: CPT | Performed by: FAMILY MEDICINE

## 2020-06-10 NOTE — NURSING NOTE
"Chief Complaint   Patient presents with     Insect Bites       Initial BP (!) 142/86   Pulse 83   Temp 98.1  F (36.7  C) (Oral)   Resp 18   Wt 118.6 kg (261 lb 6.4 oz)   SpO2 96%   BMI 37.51 kg/m   Estimated body mass index is 37.51 kg/m  as calculated from the following:    Height as of 11/21/19: 1.778 m (5' 10\").    Weight as of this encounter: 118.6 kg (261 lb 6.4 oz).  Medication Reconciliation: complete  Felipe Lucas CMA    "

## 2020-07-27 ENCOUNTER — OFFICE VISIT (OUTPATIENT)
Dept: URGENT CARE | Facility: URGENT CARE | Age: 75
End: 2020-07-27
Payer: COMMERCIAL

## 2020-07-27 VITALS
HEART RATE: 128 BPM | OXYGEN SATURATION: 98 % | SYSTOLIC BLOOD PRESSURE: 151 MMHG | DIASTOLIC BLOOD PRESSURE: 80 MMHG | RESPIRATION RATE: 16 BRPM | TEMPERATURE: 99.2 F

## 2020-07-27 DIAGNOSIS — R59.0 LYMPHADENOPATHY, POSTERIOR CERVICAL: Primary | ICD-10-CM

## 2020-07-27 PROCEDURE — 99213 OFFICE O/P EST LOW 20 MIN: CPT | Performed by: FAMILY MEDICINE

## 2020-07-27 ASSESSMENT — ENCOUNTER SYMPTOMS
NAUSEA: 0
VOMITING: 0
DIAPHORESIS: 0
NECK PAIN: 0
FEVER: 0
DIARRHEA: 0
COUGH: 0
CHILLS: 0
RHINORRHEA: 0
SHORTNESS OF BREATH: 0
SORE THROAT: 0

## 2020-07-27 NOTE — PROGRESS NOTES
SUBJECTIVE:   Rm Maldonado is a 74 year old male presenting with a chief complaint of   Chief Complaint   Patient presents with     Insect Bites     Felt like he was getting bit on back of his neck yesterday afternoon. Area sore/stiff, developed a fever last night.          Rm is a 74-year-old male presenting today with a bump on the back of his left of his neck.  He is concerned he may been bitten by a bug although does not recall entirely he did have a low-grade fever last evening.  He did have a low-grade temperature this morning of 100.3.  He is otherwise been feeling well denies any cough or viral upper respiratory symptoms has been taking his blood pressure medicine in the mornings.        Review of Systems   Constitutional: Negative for chills, diaphoresis and fever.   HENT: Negative for congestion, ear pain, rhinorrhea and sore throat.    Respiratory: Negative for cough and shortness of breath.    Gastrointestinal: Negative for diarrhea, nausea and vomiting.   Musculoskeletal: Negative for neck pain.       Past Medical History:   Diagnosis Date     Arthritis      Heart disease      Hemidiaphragm paralysis     left     History of hernia repair      HTN      Obesity      Other and unspecified hyperlipidemia      Family History   Problem Relation Age of Onset     C.A.D. Mother      Heart Disease Mother      Diabetes Mother      Obesity Mother      Alcohol/Drug Father      Obesity Daughter      Current Outpatient Medications   Medication Sig Dispense Refill     Ascorbic Acid (VITAMIN C PO) Take 2,000 mg by mouth.       aspirin 325 MG EC tablet Take 325 mg by mouth daily.       atorvastatin (LIPITOR) 40 MG tablet Take 1 tablet (40 mg) by mouth At Bedtime 90 tablet 3     calcium carbonate (OS-GENE 500 MG [place]. [place]) 500 MG tablet Take 1,000 mg by mouth daily       lecithin 1200 MG CAPS Take 400 mg by mouth daily        losartan (COZAAR) 50 MG tablet Take 1 tablet (50 mg) by mouth daily 90 tablet 3      metoprolol tartrate (LOPRESSOR) 50 MG tablet Take 1 tablet (50 mg) by mouth 2 times daily 180 tablet 3     Omega-3 Fatty Acids (FISH OIL) 1200 MG capsule Take 2 capsules by mouth daily        VITAMIN D, CHOLECALCIFEROL, PO Take 5,000 Units by mouth daily       vitamin E 400 UNITS TABS Take 400 Units by mouth daily       Social History     Tobacco Use     Smoking status: Former Smoker     Packs/day: 1.00     Years: 10.00     Pack years: 10.00     Types: Cigarettes, Pipe     Start date: 5/15/1968     Last attempt to quit: 1983     Years since quittin.9     Smokeless tobacco: Never Used   Substance Use Topics     Alcohol use: Yes     Comment: Minimal to light       OBJECTIVE  BP (!) 151/80   Pulse 128   Temp 99.2  F (37.3  C)   Resp 16   SpO2 98%    Physical Exam  HENT:      Head: Normocephalic and atraumatic.      Right Ear: External ear normal.      Left Ear: External ear normal.      Nose: Nose normal.      Mouth/Throat:      Lips: Pink.      Mouth: Mucous membranes are moist.      Dentition: Normal dentition.      Tongue: No lesions.      Palate: No mass.      Pharynx: Oropharynx is clear. No oropharyngeal exudate.      Tonsils: No tonsillar exudate or tonsillar abscesses.      Comments: Isolated left posterior cervical adenopathy normal in size and consistency.  Without any obvious anterior changes palpation.  Eyes:      General: No scleral icterus.        Right eye: No discharge.         Left eye: No discharge.      Conjunctiva/sclera: Conjunctivae normal.      Pupils: Pupils are equal, round, and reactive to light.   Neck:      Musculoskeletal: Neck supple.      Thyroid: No thyromegaly.      Trachea: No tracheal deviation.   Cardiovascular:      Rate and Rhythm: Normal rate and regular rhythm.      Heart sounds: Normal heart sounds. No murmur. No friction rub. No gallop.    Pulmonary:      Effort: Pulmonary effort is normal. No respiratory distress.      Breath sounds: Normal breath sounds. No  stridor. No wheezing or rales.   Chest:      Chest wall: No tenderness.   Abdominal:      General: Bowel sounds are normal. There is no distension.      Palpations: Abdomen is soft. There is no mass.      Tenderness: There is no abdominal tenderness. There is no guarding or rebound.   Musculoskeletal:         General: No tenderness or deformity.   Lymphadenopathy:      Cervical: No cervical adenopathy.   Skin:     General: Skin is warm and dry.      Findings: No erythema or rash.   Neurological:      Mental Status: He is alert and oriented to person, place, and time.      Cranial Nerves: No cranial nerve deficit.   Psychiatric:         Judgment: Judgment normal.         ASSESSMENT:    ICD-10-CM    1. Lymphadenopathy, posterior cervical  R59.0         PLAN:  Rm exam is reassuring he does have one isolated small posterior cervical lymph node that is normal in size minimally tender to palpation.  We will continue to monitor the resolution of his normal cervical node he otherwise appears well with no other concerns he will continue take his blood pressure medicine as prescribed.  Primary care clinic per recommendations   Alfredito Shepherd MD

## 2020-10-28 DIAGNOSIS — I10 HYPERTENSION GOAL BP (BLOOD PRESSURE) < 150/90: ICD-10-CM

## 2020-10-28 DIAGNOSIS — Z12.5 SPECIAL SCREENING FOR MALIGNANT NEOPLASM OF PROSTATE: ICD-10-CM

## 2020-10-28 DIAGNOSIS — R39.198 DIFFICULTY URINATING: ICD-10-CM

## 2020-10-28 LAB
ALBUMIN UR-MCNC: NEGATIVE MG/DL
APPEARANCE UR: CLEAR
BILIRUB UR QL STRIP: NEGATIVE
COLOR UR AUTO: YELLOW
GLUCOSE UR STRIP-MCNC: NEGATIVE MG/DL
HGB UR QL STRIP: NEGATIVE
KETONES UR STRIP-MCNC: NEGATIVE MG/DL
LEUKOCYTE ESTERASE UR QL STRIP: NEGATIVE
NITRATE UR QL: NEGATIVE
PH UR STRIP: 7 PH (ref 5–7)
PSA SERPL-ACNC: 2.44 UG/L (ref 0–4)
SOURCE: NORMAL
SP GR UR STRIP: 1.02 (ref 1–1.03)
UROBILINOGEN UR STRIP-ACNC: 1 EU/DL (ref 0.2–1)

## 2020-10-28 PROCEDURE — 81003 URINALYSIS AUTO W/O SCOPE: CPT | Performed by: FAMILY MEDICINE

## 2020-10-28 PROCEDURE — G0103 PSA SCREENING: HCPCS | Performed by: FAMILY MEDICINE

## 2020-10-28 PROCEDURE — 80048 BASIC METABOLIC PNL TOTAL CA: CPT | Performed by: FAMILY MEDICINE

## 2020-10-29 LAB
ANION GAP SERPL CALCULATED.3IONS-SCNC: 5 MMOL/L (ref 3–14)
BUN SERPL-MCNC: 14 MG/DL (ref 7–30)
CALCIUM SERPL-MCNC: 8.8 MG/DL (ref 8.5–10.1)
CHLORIDE SERPL-SCNC: 103 MMOL/L (ref 94–109)
CO2 SERPL-SCNC: 31 MMOL/L (ref 20–32)
CREAT SERPL-MCNC: 0.85 MG/DL (ref 0.66–1.25)
GFR SERPL CREATININE-BSD FRML MDRD: 86 ML/MIN/{1.73_M2}
GLUCOSE SERPL-MCNC: 74 MG/DL (ref 70–99)
POTASSIUM SERPL-SCNC: 4.2 MMOL/L (ref 3.4–5.3)
SODIUM SERPL-SCNC: 139 MMOL/L (ref 133–144)

## 2020-10-29 NOTE — PROGRESS NOTES
Subjective     Rm Maldonado is a 75 year old male who presents to clinic today for the following health issues:    HPI         Genitourinary - Male  Onset/Duration: ongoing   Description:   Dysuria (painful urination): no  Hematuria (blood in urine): no  Frequency: YES- morning,   Waking at night to urinate: sleeping 5-6 hours at night, wakes to urinate and sleep for 2 hours or more.  But not waking due to need to urinate   Hesitancy (delay in urine): YES- slow to start   Retention (unable to empty): YES  Decrease in urinary flow: Unable to quantify.    Incontinence: no  Progression of Symptoms:  same  Accompanying Signs & Symptoms:  Fever: no  Back/Flank pain: no  Urethral discharge: no  Testicle lumps/masses/pain: no  Nausea and/or vomiting: no  Abdominal pain: no  History:   History of frequent UTI s: no  History of kidney stones: no  History of hernias: YES- surgically repaired 80's-90's  Personal or Family history of Prostate problems: no  Sexually active: YES  Precipitating or alleviating factors: Noted to be slower, weak flow in the AM.  Will typically have urge to go 2 times in 2 hours in the Am, but can go longer in the rest of the day.    Symptoms slightly improved in the afternoon after fluid intake.  Therapies tried and outcome: none    Recent ua and psa normal.  Labs reviewed in EPIC  BP Readings from Last 3 Encounters:   11/09/20 130/82   07/27/20 (!) 151/80   06/10/20 (!) 142/86    Wt Readings from Last 3 Encounters:   11/09/20 122.1 kg (269 lb 3.2 oz)   06/10/20 118.6 kg (261 lb 6.4 oz)   11/21/19 109.3 kg (241 lb)                  Patient Active Problem List   Diagnosis     Exogenous obesity     Hypertension goal BP (blood pressure) < 150/90     Eczema     Advanced directives, counseling/discussion     Vitamin D deficiency     Colon polyp     Paralyzed hemidiaphragm     Hyperlipidemia LDL goal <130     Venous stasis dermatitis of left lower extremity     Bilateral leg edema     Obesity (BMI  35.0-39.9) with comorbidity (H)     Past Surgical History:   Procedure Laterality Date     ABDOMEN SURGERY       BIOPSY       COLONOSCOPY       COLONOSCOPY WITH CO2 INSUFFLATION N/A 2016    Procedure: COLONOSCOPY WITH CO2 INSUFFLATION;  Surgeon: Ariel Manzanares MD;  Location: [facility]     COLONOSCOPY WITH CO2 INSUFFLATION N/A 2019    Procedure: COLONOSCOPY, WITH CO2 INSUFFLATION;  Surgeon: Ariel Manzanares MD;  Location: [facility] OR     EYE SURGERY      left cataract      HERNIA REPAIR       HERNIA REPAIR       TONSILLECTOMY         Social History     Tobacco Use     Smoking status: Former Smoker     Packs/day: 1.00     Years: 10.00     Pack years: 10.00     Types: Cigarettes, Pipe     Start date: 5/15/1968     Quit date: 1983     Years since quittin.2     Smokeless tobacco: Never Used   Substance Use Topics     Alcohol use: Yes     Comment: Minimal to light     Family History   Problem Relation Age of Onset     C.A.D. Mother      Heart Disease Mother      Diabetes Mother      Obesity Mother      Alcohol/Drug Father      Obesity Daughter          Current Outpatient Medications   Medication Sig Dispense Refill     Ascorbic Acid (VITAMIN C PO) Take 2,000 mg by mouth.       aspirin 325 MG EC tablet Take 325 mg by mouth daily.       atorvastatin (LIPITOR) 40 MG tablet Take 1 tablet (40 mg) by mouth At Bedtime 90 tablet 3     calcium carbonate (OS-GENE 500 MG [place]. [place]) 500 MG tablet Take 1,000 mg by mouth daily       Cyanocobalamin (VITAMIN B-12 PO) Take 1,000 mg by mouth daily       lecithin 1200 MG CAPS Take 400 mg by mouth daily        losartan (COZAAR) 50 MG tablet Take 1 tablet (50 mg) by mouth daily 90 tablet 1     metoprolol tartrate (LOPRESSOR) 50 MG tablet Take 1 tablet (50 mg) by mouth 2 times daily 180 tablet 1     Omega-3 Fatty Acids (FISH OIL) 1200 MG capsule Take 2 capsules by mouth daily        tamsulosin (FLOMAX) 0.4 MG capsule Take 1 capsule (0.4 mg) by mouth daily 30 capsule 1  "    VITAMIN D, CHOLECALCIFEROL, PO Take 5,000 Units by mouth daily       vitamin E 400 UNITS TABS Take 400 Units by mouth daily              Review of Systems   Constitutional, HEENT, cardiovascular, pulmonary, gi and gu systems are negative, except as otherwise noted.      Objective    /82   Pulse 79   Temp 97.9  F (36.6  C) (Tympanic)   Resp 18   Ht 1.778 m (5' 10\")   Wt 122.1 kg (269 lb 3.2 oz)   SpO2 99%   BMI 38.63 kg/m    Body mass index is 38.63 kg/m .  Physical Exam   GENERAL: healthy, alert and no distress  EYES: Eyes grossly normal to inspection, PERRL and conjunctivae and sclerae normal  NECK: no adenopathy, no asymmetry, masses, or scars and thyroid normal to palpation  RESP: lungs clear to auscultation - no rales, rhonchi or wheezes  CV: regular rate and rhythm, normal S1 S2, no S3 or S4, no murmur, click or rub, no peripheral edema and peripheral pulses strong  ABDOMEN: soft, nontender, no hepatosplenomegaly, no masses and bowel sounds normal  MS: no gross musculoskeletal defects noted, no edema  SKIN: no suspicious lesions or rashes  PSYCH: mentation appears normal, affect normal/bright            Assessment & Plan     (N40.1,  R35.0) Benign prostatic hyperplasia with urinary frequency  (primary encounter diagnosis)  Comment: frequency and poor stream, not much nocturia  Plan: tamsulosin (FLOMAX) 0.4 MG capsule        Start flomax 0.4 mg daily, 30 tabs sent, follow-up via MyChart on how he is doing for refill  Watch bp has this can lower bp as well    (I10) Hypertension goal BP (blood pressure) < 150/90  Comment: to goal  Plan: losartan (COZAAR) 50 MG tablet, metoprolol         tartrate (LOPRESSOR) 50 MG tablet         Refill x 6 months recent labs normal  Declines medicare wellness this year    (I70.0) Atherosclerosis of abdominal aorta (H)  (I65.23) Carotid atherosclerosis, bilateral  Comment: stable  Plan: atorvastatin (LIPITOR) 40 MG tablet        Refill x 1 yr        (E66.01) Morbid " obesity (H)  Comment: wt rising  Plan: increase activity and watch nutrition           Patient Instructions       Consider getting these vaccines:  1. Influenza  2. Pneumonia (PPV-23)  3. Shingrix (Shingles)      Return in about 2 weeks (around 11/23/2020) for video visit, Wellness Exam.    Riddhi Lopez MD  [facility]

## 2020-10-30 NOTE — RESULT ENCOUNTER NOTE
Rm,  Here are your lab results.  We will discuss these at your upcoming office or telephone visit.   See you soon.  Riddhi Lopez MD

## 2020-11-09 ENCOUNTER — OFFICE VISIT (OUTPATIENT)
Dept: FAMILY MEDICINE | Facility: CLINIC | Age: 75
End: 2020-11-09
Payer: COMMERCIAL

## 2020-11-09 VITALS
TEMPERATURE: 97.9 F | HEIGHT: 70 IN | HEART RATE: 79 BPM | DIASTOLIC BLOOD PRESSURE: 82 MMHG | RESPIRATION RATE: 18 BRPM | SYSTOLIC BLOOD PRESSURE: 130 MMHG | OXYGEN SATURATION: 99 % | WEIGHT: 269.2 LBS | BODY MASS INDEX: 38.54 KG/M2

## 2020-11-09 DIAGNOSIS — I70.0 ATHEROSCLEROSIS OF ABDOMINAL AORTA (H): ICD-10-CM

## 2020-11-09 DIAGNOSIS — N40.1 BENIGN PROSTATIC HYPERPLASIA WITH URINARY FREQUENCY: Primary | ICD-10-CM

## 2020-11-09 DIAGNOSIS — E66.01 MORBID OBESITY (H): ICD-10-CM

## 2020-11-09 DIAGNOSIS — I10 HYPERTENSION GOAL BP (BLOOD PRESSURE) < 150/90: ICD-10-CM

## 2020-11-09 DIAGNOSIS — R35.0 BENIGN PROSTATIC HYPERPLASIA WITH URINARY FREQUENCY: Primary | ICD-10-CM

## 2020-11-09 DIAGNOSIS — I65.23 CAROTID ATHEROSCLEROSIS, BILATERAL: ICD-10-CM

## 2020-11-09 PROCEDURE — 99214 OFFICE O/P EST MOD 30 MIN: CPT | Performed by: FAMILY MEDICINE

## 2020-11-09 RX ORDER — METOPROLOL TARTRATE 50 MG
50 TABLET ORAL 2 TIMES DAILY
Qty: 180 TABLET | Refills: 1 | Status: SHIPPED | OUTPATIENT
Start: 2020-11-09 | End: 2021-04-20

## 2020-11-09 RX ORDER — TAMSULOSIN HYDROCHLORIDE 0.4 MG/1
0.4 CAPSULE ORAL DAILY
Qty: 30 CAPSULE | Refills: 1 | Status: SHIPPED | OUTPATIENT
Start: 2020-11-09 | End: 2021-03-03

## 2020-11-09 RX ORDER — LOSARTAN POTASSIUM 50 MG/1
50 TABLET ORAL DAILY
Qty: 90 TABLET | Refills: 1 | Status: SHIPPED | OUTPATIENT
Start: 2020-11-09 | End: 2021-04-20

## 2020-11-09 RX ORDER — ATORVASTATIN CALCIUM 40 MG/1
40 TABLET, FILM COATED ORAL AT BEDTIME
Qty: 90 TABLET | Refills: 3 | Status: SHIPPED | OUTPATIENT
Start: 2020-11-09 | End: 2021-02-26

## 2020-11-09 ASSESSMENT — MIFFLIN-ST. JEOR: SCORE: 1967.33

## 2020-11-09 NOTE — NURSING NOTE
"Chief Complaint   Patient presents with     Urinary Problem       Initial /82   Pulse 79   Temp 97.9  F (36.6  C) (Tympanic)   Resp 18   Ht 1.778 m (5' 10\")   Wt 122.1 kg (269 lb 3.2 oz)   SpO2 99%   BMI 38.63 kg/m   Estimated body mass index is 38.63 kg/m  as calculated from the following:    Height as of this encounter: 1.778 m (5' 10\").    Weight as of this encounter: 122.1 kg (269 lb 3.2 oz).  Medication Reconciliation: complete  Felipe Lucas CMA    "

## 2021-01-15 ENCOUNTER — HEALTH MAINTENANCE LETTER (OUTPATIENT)
Age: 76
End: 2021-01-15

## 2021-02-26 ENCOUNTER — MYC MEDICAL ADVICE (OUTPATIENT)
Dept: FAMILY MEDICINE | Facility: CLINIC | Age: 76
End: 2021-02-26

## 2021-02-26 DIAGNOSIS — R35.0 BENIGN PROSTATIC HYPERPLASIA WITH URINARY FREQUENCY: ICD-10-CM

## 2021-02-26 DIAGNOSIS — I65.23 CAROTID ATHEROSCLEROSIS, BILATERAL: ICD-10-CM

## 2021-02-26 DIAGNOSIS — I70.0 ATHEROSCLEROSIS OF ABDOMINAL AORTA (H): ICD-10-CM

## 2021-02-26 DIAGNOSIS — N40.1 BENIGN PROSTATIC HYPERPLASIA WITH URINARY FREQUENCY: ICD-10-CM

## 2021-02-26 RX ORDER — ATORVASTATIN CALCIUM 40 MG/1
40 TABLET, FILM COATED ORAL AT BEDTIME
Qty: 90 TABLET | Refills: 2 | Status: SHIPPED | OUTPATIENT
Start: 2021-02-26 | End: 2021-11-15

## 2021-02-26 NOTE — TELEPHONE ENCOUNTER
I have resent the atorvastatin prescription to the new pharmacy.     To provider to review side effects pt is having from the Tamsulosin.    When done send to pool for someone to update insurance information.   Kavitha ROBBINSN, RN

## 2021-03-03 RX ORDER — FINASTERIDE 5 MG/1
5 TABLET, FILM COATED ORAL DAILY
Qty: 30 TABLET | Refills: 1 | Status: SHIPPED | OUTPATIENT
Start: 2021-03-03 | End: 2021-09-08

## 2021-03-05 ENCOUNTER — IMMUNIZATION (OUTPATIENT)
Dept: PEDIATRICS | Facility: CLINIC | Age: 76
End: 2021-03-05
Payer: COMMERCIAL

## 2021-03-05 PROCEDURE — 91300 PR COVID VAC PFIZER DIL RECON 30 MCG/0.3 ML IM: CPT

## 2021-03-05 PROCEDURE — 0001A PR COVID VAC PFIZER DIL RECON 30 MCG/0.3 ML IM: CPT

## 2021-03-26 ENCOUNTER — IMMUNIZATION (OUTPATIENT)
Dept: PEDIATRICS | Facility: CLINIC | Age: 76
End: 2021-03-26
Attending: INTERNAL MEDICINE
Payer: COMMERCIAL

## 2021-03-26 PROCEDURE — 0002A PR COVID VAC PFIZER DIL RECON 30 MCG/0.3 ML IM: CPT

## 2021-03-26 PROCEDURE — 91300 PR COVID VAC PFIZER DIL RECON 30 MCG/0.3 ML IM: CPT

## 2021-04-17 ENCOUNTER — NURSE TRIAGE (OUTPATIENT)
Dept: NURSING | Facility: CLINIC | Age: 76
End: 2021-04-17

## 2021-04-18 NOTE — TELEPHONE ENCOUNTER
"L ear problem: He states it seems to be a little swollen, tender to the touch (\"2-3\") and he can feel pressure. \"It does not hurt. It began this morning and feels like it is getting worse. \" When he moves his jaw, he states he can feel it in his ear. No drainage. No fever noted. He can hear out of that ear. No previous hx of ear infections. No recent cold sx.   Triaged to a disposition of See provider within 24 hrs. Home care given per guideline. If sx persist, he is considering either going to  tomorrow, or making a clinic appointment for Monday.     Jeanine Zaidi RN Triage Nurse Advisor 9:27 PM 4/17/2021    Additional Information    Negative: Ear pain is main symptom    Negative: Hearing loss (complete or partial) is main symptom    Negative: Earwax is the main concern    Negative: [1] Has nasal allergies AND [2] they are acting up    Negative: Earache persists > 1 hour    Negative: Pus or cloudy discharge from ear canal    Negative: Ear congestion present > 48 hours    Ear congestion    Negative: Moving the earlobe or touching the ear clearly increases the pain    Negative: Foreign body struck in the ear (e.g., bug, piece of cotton)    Negative: Followed an ear injury    Negative: [1] Recently diagnosed with otitis media AND [2] currently on oral antibiotics    Negative: [1] Stiff neck (unable to touch chin to chest) AND [2] fever    Negative: [1] Bony area of skull behind the ear is pink or swollen AND [2] fever    Negative: Fever > 104 F (40 C)    Negative: Patient sounds very sick or weak to the triager    Negative: [1] SEVERE pain AND [2] not improved 2 hours after taking analgesic medication (e.g., ibuprofen or acetaminophen)    Negative: Walking is very unsteady    Negative: Sudden onset of ear pain after long - thin object was inserted into the ear canal (e.g., pencil, Q-tip)    Negative: Diabetes mellitus or weak immune system (e.g., HIV positive, cancer chemo, splenectomy, organ transplant, chronic " steroids)    Negative: New blurred vision or vision changes    Negative: White, yellow, or green discharge    Negative: Bloody discharge or unexplained bleeding from ear canal    Earache  (Exceptions: brief ear pain of < 60 minutes duration, earache occurring during air travel    Protocols used: EAR - CONGESTION-A-AH, EARACHE-A-AH  COVID 19 Nurse Triage Plan/Patient Instructions    Please be aware that novel coronavirus (COVID-19) may be circulating in the community. If you develop symptoms such as fever, cough, or SOB or if you have concerns about the presence of another infection including coronavirus (COVID-19), please contact your health care provider or visit [email or web address].     Disposition/Instructions    In-Person Visit with provider recommended. Reference Visit Selection Guide.    Thank you for taking steps to prevent the spread of this virus.  o Limit your contact with others.  o Wear a simple mask to cover your cough.  o Wash your hands well and often.    Resources    [facility]: About COVID-19: www.[facility].org/covid19/    CDC: What to Do If You're Sick: www.cdc.gov/coronavirus/2019-ncov/about/steps-when-sick.html    CDC: Ending Home Isolation: www.cdc.gov/coronavirus/2019-ncov/hcp/disposition-in-home-patients.html     CDC: Caring for Someone: www.cdc.gov/coronavirus/2019-ncov/if-you-are-sick/care-for-someone.html     [facility]: Interim Guidance for Hospital Discharge to Home: www.health.[facility].[place]/diseases/coronavirus/hcp/hospdischarge.pdf    [facility] clinical trials (COVID-19 research studies): clinicalaffairs.[facility]/umn-clinical-trials     Below are the COVID-19 hotlines at the [place] Department of Health ([facility]). Interpreters are available.   o For health questions: Call 863-465-3210 or 1-335.499.7513 (7 a.m. to 7 p.m.)  o For questions about schools and childcare: Call 276-351-8431 or 1-889.327.6136 (7 a.m. to 7 p.m.)

## 2021-04-19 ENCOUNTER — MYC MEDICAL ADVICE (OUTPATIENT)
Dept: FAMILY MEDICINE | Facility: CLINIC | Age: 76
End: 2021-04-19

## 2021-04-19 DIAGNOSIS — I10 HYPERTENSION GOAL BP (BLOOD PRESSURE) < 150/90: ICD-10-CM

## 2021-04-20 ENCOUNTER — MYC MEDICAL ADVICE (OUTPATIENT)
Dept: FAMILY MEDICINE | Facility: CLINIC | Age: 76
End: 2021-04-20

## 2021-04-20 RX ORDER — METOPROLOL TARTRATE 50 MG
50 TABLET ORAL 2 TIMES DAILY
Qty: 180 TABLET | Refills: 1 | Status: SHIPPED | OUTPATIENT
Start: 2021-04-20 | End: 2021-11-15

## 2021-04-20 RX ORDER — LOSARTAN POTASSIUM 50 MG/1
50 TABLET ORAL DAILY
Qty: 90 TABLET | Refills: 1 | Status: SHIPPED | OUTPATIENT
Start: 2021-04-20 | End: 2021-10-25

## 2021-04-21 ENCOUNTER — OFFICE VISIT (OUTPATIENT)
Dept: FAMILY MEDICINE | Facility: CLINIC | Age: 76
End: 2021-04-21
Payer: COMMERCIAL

## 2021-04-21 VITALS
RESPIRATION RATE: 20 BRPM | TEMPERATURE: 97.6 F | BODY MASS INDEX: 39.72 KG/M2 | WEIGHT: 276.8 LBS | DIASTOLIC BLOOD PRESSURE: 84 MMHG | HEART RATE: 85 BPM | SYSTOLIC BLOOD PRESSURE: 132 MMHG

## 2021-04-21 DIAGNOSIS — E78.5 HYPERLIPIDEMIA LDL GOAL <130: Primary | ICD-10-CM

## 2021-04-21 DIAGNOSIS — H69.92 DYSFUNCTION OF LEFT EUSTACHIAN TUBE: ICD-10-CM

## 2021-04-21 DIAGNOSIS — I10 HYPERTENSION GOAL BP (BLOOD PRESSURE) < 150/90: ICD-10-CM

## 2021-04-21 LAB
ANION GAP SERPL CALCULATED.3IONS-SCNC: 2 MMOL/L (ref 3–14)
BUN SERPL-MCNC: 22 MG/DL (ref 7–30)
CALCIUM SERPL-MCNC: 9 MG/DL (ref 8.5–10.1)
CHLORIDE SERPL-SCNC: 104 MMOL/L (ref 94–109)
CHOLEST SERPL-MCNC: 165 MG/DL
CO2 SERPL-SCNC: 32 MMOL/L (ref 20–32)
CREAT SERPL-MCNC: 0.92 MG/DL (ref 0.66–1.25)
GFR SERPL CREATININE-BSD FRML MDRD: 81 ML/MIN/{1.73_M2}
GLUCOSE SERPL-MCNC: 104 MG/DL (ref 70–99)
HDLC SERPL-MCNC: 63 MG/DL
LDLC SERPL CALC-MCNC: 80 MG/DL
NONHDLC SERPL-MCNC: 102 MG/DL
POTASSIUM SERPL-SCNC: 4.2 MMOL/L (ref 3.4–5.3)
SODIUM SERPL-SCNC: 138 MMOL/L (ref 133–144)
TRIGL SERPL-MCNC: 112 MG/DL

## 2021-04-21 PROCEDURE — 80048 BASIC METABOLIC PNL TOTAL CA: CPT | Performed by: FAMILY MEDICINE

## 2021-04-21 PROCEDURE — 36415 COLL VENOUS BLD VENIPUNCTURE: CPT | Performed by: FAMILY MEDICINE

## 2021-04-21 PROCEDURE — 80061 LIPID PANEL: CPT | Performed by: FAMILY MEDICINE

## 2021-04-21 PROCEDURE — 99214 OFFICE O/P EST MOD 30 MIN: CPT | Performed by: FAMILY MEDICINE

## 2021-04-21 ASSESSMENT — PAIN SCALES - GENERAL: PAINLEVEL: NO PAIN (0)

## 2021-04-21 NOTE — PROGRESS NOTES
SUBJECTIVE:  Rm Maldonado is a 75 year old male who presents with the following concerns;              Symptoms: cc Present Absent Comment   Fever/Chills   x    Fatigue  x  Ongoing    Muscle Aches   x    Eye Irritation   x    Sneezing   x    Nasal Quan/Drg   x    Sinus Pressure/Pain   x    Loss of smell   x    Dental pain   x    Sore Throat   x    Swollen Glands   x    Ear Pain/Fullness  x  Left sided, feeling plugged/swollen, mild discomfort, pressure with jaw closing causes movement sensation in ear    Cough  x  Possible chronic cough    Wheeze   x    Chest Pain   x    Shortness of breath   x    Rash   x    Other   x      Symptom duration:  4/17/21-onset of symptoms    Sympom severity:  slightly less today    Treatments tried:  movement on outer side of ear, plugging nose and drinking water- per FNA   Contacts:  none        Medications updated and reviewed.  ROS:  Other than noted above, general, HEENT, respiratory, cardiac and gastrointestinal systems are negative.    /84   Pulse 85   Temp 97.6  F (36.4  C) (Oral)   Resp 20   Wt 125.6 kg (276 lb 12.8 oz)   BMI 39.72 kg/m      OBJECTIVE:  GENERAL:  Alert, no acute distress  EYES:  PERRL, EOM normal, conjunctiva and lids normal  HEENT: TMs normal. Left external ear slightly erythematous -no pain with manipulation or palpation, slightly increased vascularity to canal.  Oral mucosa and posterior oropharynx normal  RESP:  Lungs clear to auscultation.  CV: normal rate, regular rhythm, no murmur or gallop.        (H69.82) Dysfunction of left eustachian tube (primary encounter diagnosis)  Comment: Feels like ear needs to pop. Exam findings not consistent with otitis media or externa.   Plan: Trial afrin x 3 days        (E78.5) Hyperlipidemia LDL goal <130   Comment: Stable  Plan: Lipid panel reflex to direct LDL Fasting        Follow up in 6 months for BP check and wellness visit    (I10) Hypertension goal BP (blood pressure) < 150/90  Comment:  Stable  Plan: BASIC METABOLIC PANEL        Follow up in 6 months for BP check and wellness visit    ELIS CarlinS  Riddhi Lopez MD

## 2021-04-21 NOTE — TELEPHONE ENCOUNTER
Next 5 appointments (look out 90 days)    Apr 21, 2021  9:55 AM  SHORT with Riddhi Lopez MD  [facility] ([facility] ) [address] [facility] [address]  [phone]   May 12, 2021 10:00 AM  Lab visit with AN LAB  [facility] ([facility] ) [address] [facility] [address]  [phone]        Patient will come to appointment now for evaluation.  Samia Hernandez RN

## 2021-04-21 NOTE — NURSING NOTE
"Chief Complaint   Patient presents with     Otalgia       Initial BP (!) 168/96   Pulse 85   Temp 97.6  F (36.4  C) (Oral)   Resp 20   Wt 125.6 kg (276 lb 12.8 oz)   BMI 39.72 kg/m   Estimated body mass index is 39.72 kg/m  as calculated from the following:    Height as of 11/9/20: 1.778 m (5' 10\").    Weight as of this encounter: 125.6 kg (276 lb 12.8 oz).  Medication Reconciliation: complete  Felipe Lucas CMA    "

## 2021-04-21 NOTE — PATIENT INSTRUCTIONS
Ok to use Afrin or neosynephrine nose spray for up to 3 days in a row to help with ear pressure

## 2021-06-24 ENCOUNTER — MYC MEDICAL ADVICE (OUTPATIENT)
Dept: FAMILY MEDICINE | Facility: CLINIC | Age: 76
End: 2021-06-24

## 2021-06-24 ENCOUNTER — E-VISIT (OUTPATIENT)
Dept: FAMILY MEDICINE | Facility: CLINIC | Age: 76
End: 2021-06-24
Payer: COMMERCIAL

## 2021-06-24 DIAGNOSIS — R05.9 COUGH: Primary | ICD-10-CM

## 2021-06-24 PROCEDURE — 99421 OL DIG E/M SVC 5-10 MIN: CPT | Performed by: FAMILY MEDICINE

## 2021-06-24 NOTE — TELEPHONE ENCOUNTER
Pt states he called to schedule COVID test and no appointments available until Saturday. Pt inquires if there are other options within [facility], or the community to get the test sooner. RN discussed [facility] scheduling staff have offered him the options through [place], and RN recommended pt visits the [place] Dept of Health website for resources in the community if he would like to see other available options for testing.     ITZEL UlloaN, RN

## 2021-06-26 DIAGNOSIS — R05.9 COUGH: ICD-10-CM

## 2021-06-26 LAB
SARS-COV-2 RNA RESP QL NAA+PROBE: NORMAL
SPECIMEN SOURCE: NORMAL

## 2021-06-26 PROCEDURE — U0003 INFECTIOUS AGENT DETECTION BY NUCLEIC ACID (DNA OR RNA); SEVERE ACUTE RESPIRATORY SYNDROME CORONAVIRUS 2 (SARS-COV-2) (CORONAVIRUS DISEASE [COVID-19]), AMPLIFIED PROBE TECHNIQUE, MAKING USE OF HIGH THROUGHPUT TECHNOLOGIES AS DESCRIBED BY CMS-2020-01-R: HCPCS | Performed by: FAMILY MEDICINE

## 2021-06-26 PROCEDURE — U0005 INFEC AGEN DETEC AMPLI PROBE: HCPCS | Performed by: FAMILY MEDICINE

## 2021-06-27 LAB
LABORATORY COMMENT REPORT: NORMAL
SARS-COV-2 RNA RESP QL NAA+PROBE: NEGATIVE
SPECIMEN SOURCE: NORMAL

## 2021-08-23 ENCOUNTER — MYC MEDICAL ADVICE (OUTPATIENT)
Dept: FAMILY MEDICINE | Facility: CLINIC | Age: 76
End: 2021-08-23

## 2021-09-02 ENCOUNTER — MYC MEDICAL ADVICE (OUTPATIENT)
Dept: FAMILY MEDICINE | Facility: CLINIC | Age: 76
End: 2021-09-02

## 2021-09-08 ENCOUNTER — ANCILLARY PROCEDURE (OUTPATIENT)
Dept: GENERAL RADIOLOGY | Facility: CLINIC | Age: 76
End: 2021-09-08
Attending: PHYSICIAN ASSISTANT
Payer: COMMERCIAL

## 2021-09-08 ENCOUNTER — OFFICE VISIT (OUTPATIENT)
Dept: FAMILY MEDICINE | Facility: CLINIC | Age: 76
End: 2021-09-08
Payer: COMMERCIAL

## 2021-09-08 VITALS
WEIGHT: 270 LBS | SYSTOLIC BLOOD PRESSURE: 132 MMHG | RESPIRATION RATE: 17 BRPM | BODY MASS INDEX: 38.74 KG/M2 | DIASTOLIC BLOOD PRESSURE: 88 MMHG | HEART RATE: 99 BPM | OXYGEN SATURATION: 96 % | TEMPERATURE: 98.2 F

## 2021-09-08 DIAGNOSIS — R05.9 COUGH: Primary | ICD-10-CM

## 2021-09-08 DIAGNOSIS — J32.1 FRONTAL SINUSITIS, UNSPECIFIED CHRONICITY: ICD-10-CM

## 2021-09-08 PROCEDURE — 71046 X-RAY EXAM CHEST 2 VIEWS: CPT | Performed by: RADIOLOGY

## 2021-09-08 PROCEDURE — 99213 OFFICE O/P EST LOW 20 MIN: CPT | Performed by: PHYSICIAN ASSISTANT

## 2021-09-08 RX ORDER — FLUTICASONE PROPIONATE 50 MCG
1 SPRAY, SUSPENSION (ML) NASAL DAILY
Qty: 1 G | Refills: 1 | Status: SHIPPED | OUTPATIENT
Start: 2021-09-08 | End: 2022-11-23

## 2021-09-08 ASSESSMENT — PAIN SCALES - GENERAL: PAINLEVEL: NO PAIN (0)

## 2021-09-08 NOTE — PROGRESS NOTES
Assessment & Plan       ICD-10-CM    1. Cough  R05 XR Chest 2 Views   2. Frontal sinusitis, unspecified chronicity  J32.1 amoxicillin-clavulanate (AUGMENTIN) 875-125 MG tablet     fluticasone (FLONASE) 50 MCG/ACT nasal spray   Talk to patient about his concerns I believe his symptoms are more for chronic sinusitis.  Warning signs discussed.  side effects discussed  Symptomatic treatment: such as fluids,  OTC acetaminophen and /or non-steroidal anti-inflammatory medication.  Follow up  1-2 wks as needed    Return in about 2 weeks (around 9/22/2021) for recheck, As Needed.    GRACIELA [facility]    Johnny Caceres is a 76 year old who presents for the following health issues     HPI     Acute Illness  Acute illness concerns: cough   Onset/Duration: 6-8 weeks - started middle of June. Got better 6 wks later. Then came back. History of off and on cough in the past.   Symptoms:  Fever: no  Chills/Sweats: no  Headache (location?): no  Sinus Pressure: yes, frontal headache at times.   Conjunctivitis:  no  Ear Pain: no  Rhinorrhea: no  Congestion: nasal congestion with laying down. Gets up and has to cough to clear throat for a couple minutes then feels fine.   Sore Throat: no  Cough: YES  Wheeze: Occ  Decreased Appetite: no  Nausea: no  Vomiting: no  Diarrhea: no  Dysuria/Freq.: no  Dysuria or Hematuria: no  Fatigue/Achiness: no  occ short of breath.   Doesn't feel ill.     Sick/Strep Exposure: no  Therapies tried and outcome: musinex  Had negative covid test done     History of paralyzed hemidiaphragm.     Review of Systems   Constitutional, HEENT, cardiovascular, pulmonary, gi and gu systems are negative, except as otherwise noted.      Objective    /88   Pulse 99   Temp 98.2  F (36.8  C) (Tympanic)   Resp 17   Wt 122.5 kg (270 lb)   SpO2 96%   BMI 38.74 kg/m    Body mass index is 38.74 kg/m .  Physical Exam   GENERAL: healthy, alert and no distress  EYES: Eyes  grossly normal to inspection, PERRL and conjunctivae and sclerae normal  HENT: ear canals and TM's normal, nose and mouth without ulcers or lesions  NECK: no adenopathy, no asymmetry, masses, or scars and thyroid normal to palpation  RESP: lungs clear to auscultation - no rales, rhonchi or wheezes- decrease breath sounds LLL.   CV: regular rate and rhythm, normal S1 S2, no S3 or S4, no murmur, click or rub, no peripheral edema and peripheral pulses strong  NEURO: Normal strength and tone, mentation intact and speech normal  PSYCH: mentation appears normal, affect normal/bright    X-ray chest: neg. Left hemidiaphragm. pending radiology

## 2021-09-09 NOTE — RESULT ENCOUNTER NOTE
MrJavier Maldonado,    Your x-ray was read as normal by the radiologist.     Please contact the clinic if you have additional questions.  Thank you.    Sincerely,    Vinny Hill PA-C

## 2021-09-26 ENCOUNTER — HEALTH MAINTENANCE LETTER (OUTPATIENT)
Age: 76
End: 2021-09-26

## 2021-10-15 ENCOUNTER — MYC MEDICAL ADVICE (OUTPATIENT)
Dept: FAMILY MEDICINE | Facility: CLINIC | Age: 76
End: 2021-10-15

## 2021-10-15 DIAGNOSIS — N40.1 BENIGN PROSTATIC HYPERPLASIA WITH URINARY FREQUENCY: ICD-10-CM

## 2021-10-15 DIAGNOSIS — R35.0 BENIGN PROSTATIC HYPERPLASIA WITH URINARY FREQUENCY: ICD-10-CM

## 2021-10-15 NOTE — TELEPHONE ENCOUNTER
Routing refill request to provider for review/approval because:  Drug not active on patient's medication list  See pt note.  Kavitha ROBBINSN, RN

## 2021-10-16 RX ORDER — TAMSULOSIN HYDROCHLORIDE 0.4 MG/1
0.4 CAPSULE ORAL DAILY
Qty: 30 CAPSULE | Refills: 1 | OUTPATIENT
Start: 2021-10-16

## 2021-10-19 ENCOUNTER — MYC MEDICAL ADVICE (OUTPATIENT)
Dept: FAMILY MEDICINE | Facility: CLINIC | Age: 76
End: 2021-10-19

## 2021-10-19 DIAGNOSIS — I10 HYPERTENSION GOAL BP (BLOOD PRESSURE) < 150/90: Primary | ICD-10-CM

## 2021-10-19 DIAGNOSIS — R35.0 BENIGN PROSTATIC HYPERPLASIA WITH URINARY FREQUENCY: ICD-10-CM

## 2021-10-19 DIAGNOSIS — N40.1 BENIGN PROSTATIC HYPERPLASIA WITH URINARY FREQUENCY: ICD-10-CM

## 2021-10-19 RX ORDER — TAMSULOSIN HYDROCHLORIDE 0.4 MG/1
0.4 CAPSULE ORAL DAILY
Qty: 30 CAPSULE | Refills: 1 | Status: SHIPPED | OUTPATIENT
Start: 2021-10-19 | End: 2021-11-15

## 2021-10-20 DIAGNOSIS — R35.0 BENIGN PROSTATIC HYPERPLASIA WITH URINARY FREQUENCY: ICD-10-CM

## 2021-10-20 DIAGNOSIS — N40.1 BENIGN PROSTATIC HYPERPLASIA WITH URINARY FREQUENCY: ICD-10-CM

## 2021-10-21 RX ORDER — TAMSULOSIN HYDROCHLORIDE 0.4 MG/1
CAPSULE ORAL
Qty: 30 CAPSULE | Refills: 0 | OUTPATIENT
Start: 2021-10-21

## 2021-10-24 ENCOUNTER — MYC MEDICAL ADVICE (OUTPATIENT)
Dept: FAMILY MEDICINE | Facility: CLINIC | Age: 76
End: 2021-10-24

## 2021-10-24 DIAGNOSIS — I10 HYPERTENSION GOAL BP (BLOOD PRESSURE) < 150/90: ICD-10-CM

## 2021-10-25 RX ORDER — LOSARTAN POTASSIUM 50 MG/1
50 TABLET ORAL DAILY
Qty: 90 TABLET | Refills: 0 | Status: SHIPPED | OUTPATIENT
Start: 2021-10-25 | End: 2021-11-15

## 2021-11-08 ENCOUNTER — LAB (OUTPATIENT)
Dept: LAB | Facility: CLINIC | Age: 76
End: 2021-11-08
Payer: COMMERCIAL

## 2021-11-08 DIAGNOSIS — I10 HYPERTENSION GOAL BP (BLOOD PRESSURE) < 150/90: ICD-10-CM

## 2021-11-08 LAB
ANION GAP SERPL CALCULATED.3IONS-SCNC: 3 MMOL/L (ref 3–14)
BUN SERPL-MCNC: 17 MG/DL (ref 7–30)
CALCIUM SERPL-MCNC: 8.7 MG/DL (ref 8.5–10.1)
CHLORIDE BLD-SCNC: 105 MMOL/L (ref 94–109)
CO2 SERPL-SCNC: 31 MMOL/L (ref 20–32)
CREAT SERPL-MCNC: 0.82 MG/DL (ref 0.66–1.25)
GFR SERPL CREATININE-BSD FRML MDRD: 87 ML/MIN/1.73M2
GLUCOSE BLD-MCNC: 91 MG/DL (ref 70–99)
POTASSIUM BLD-SCNC: 4.1 MMOL/L (ref 3.4–5.3)
SODIUM SERPL-SCNC: 139 MMOL/L (ref 133–144)

## 2021-11-08 PROCEDURE — 80048 BASIC METABOLIC PNL TOTAL CA: CPT

## 2021-11-08 PROCEDURE — 36415 COLL VENOUS BLD VENIPUNCTURE: CPT

## 2021-11-08 NOTE — PATIENT INSTRUCTIONS
Patient Education   Personalized Prevention Plan  You are due for the preventive services outlined below.  Your care team is available to assist you in scheduling these services.  If you have already completed any of these items, please share that information with your care team to update in your medical record.  Health Maintenance Due   Topic Date Due     ANNUAL REVIEW OF HM ORDERS  Never done     Zoster (Shingles) Vaccine (1 of 2) Never done     Pneumococcal Vaccine (1 of 1 - PPSV23) Never done     Flu Vaccine (1) 09/01/2021     COVID-19 Vaccine (3 - Booster for Pfizer series) 09/26/2021           Make an appointment with dermatology    Increase tamsulosin dose to 0.8 mg.

## 2021-11-08 NOTE — PROGRESS NOTES
"SUBJECTIVE:   Rm Maldonado is a 76 year old male who presents for Preventive Visit.      Patient has been advised of split billing requirements and indicates understanding: Yes   Are you in the first 12 months of your Medicare coverage?  No    Healthy Habits:    In general, how would you rate your overall health?  Fair    Frequency of exercise:  4-5 days/week    Duration of exercise:  Less than 15 minutes    Do you usually eat at least 4 servings of fruit and vegetables a day, include whole grains    & fiber and avoid regularly eating high fat or \"junk\" foods?  No    Taking medications regularly:  Yes    Barriers to taking medications:  None    Medication side effects:  None    Ability to successfully perform activities of daily living:  No assistance needed    Home Safety:  No safety concerns identified    Hearing Impairment:  Need to ask people to speak up or repeat themselves    In the past 6 months, have you been bothered by leaking of urine? Yes    In general, how would you rate your overall mental or emotional health?  Fair      PHQ-2 Total Score:    Do you feel safe in your environment? Yes    Have you ever done Advance Care Planning? (For example, a Health Directive, POLST, or a discussion with a medical provider or your loved ones about your wishes): Yes, advance care planning is on file.       Fall risk  Fallen 2 or more times in the past year?: No  Any fall with injury in the past year?: No    Cognitive Screening   1) Repeat 3 items (Leader, Season, Table)    2) Clock draw: NORMAL  3) 3 item recall: Recalls 3 objects  Results: NORMAL clock, 1-2 items recalled: COGNITIVE IMPAIRMENT LESS LIKELY    Mini-CogTM Vicky Johnson. Licensed by the author for use in [facility]; reprinted with permission (nick@.[email or web address]). All rights reserved.      Do you have sleep apnea, excessive snoring or daytime drowsiness?: taking frequent naps, dozing during day, sleep pattern has changed     Reviewed and " updated as needed this visit by clinical staff  Tobacco  Allergies  Meds  Problems  Med Hx  Surg Hx  Fam Hx  Soc Hx         Reviewed and updated as needed this visit by Provider  Tobacco  Allergies  Meds  Problems  Med Hx  Surg Hx  Fam Hx        Social History     Tobacco Use     Smoking status: Former Smoker     Packs/day: 1.00     Years: 10.00     Pack years: 10.00     Types: Cigarettes, Pipe     Start date: 5/15/1968     Quit date: 1983     Years since quittin.2     Smokeless tobacco: Never Used   Substance Use Topics     Alcohol use: Yes     Comment: Minimal to light     If you drink alcohol do you typically have >3 drinks per day or >7 drinks per week? Not applicable    Alcohol Use 11/15/2021   Prescreen: >3 drinks/day or >7 drinks/week? -   Prescreen: >3 drinks/day or >7 drinks/week? Not Applicable               Current providers sharing in care for this patient include:   Patient Care Team:  Riddhi Lopez MD as PCP - General (Family Practice)  Riddhi Lopez MD as Assigned PCP    The following health maintenance items are reviewed in Epic and correct as of today:  Health Maintenance Due   Topic Date Due     ANNUAL REVIEW OF HM ORDERS  Never done     ZOSTER IMMUNIZATION (1 of 2) Never done     Pneumococcal Vaccine: 65+ Years (1 of 1 - PPSV23) Never done     INFLUENZA VACCINE (1) 2021     COVID-19 Vaccine (3 - Booster for Pfizer series) 2021       Family history of prostate cancer: No  Last PSA:   PSA   Date Value Ref Range Status   10/28/2020 2.44 0 - 4 ug/L Final     Comment:     Assay Method:  Chemiluminescence using Siemens Vista analyzer     Doing self testicular exam: YES     Family history of colon cancer:No  Last colonscopy: 2019 q5y     Family history of CAD:Yes mother  Last Cholesterol:   Lab Results   Component Value Date    CHOL 165 2021     Lab Results   Component Value Date    HDL 63 2021     Lab Results   Component Value Date    LDL  80 04/21/2021     Lab Results   Component Value Date    TRIG 112 04/21/2021     Lab Results   Component Value Date    CHOLHDLRATIO 3.6 10/26/2015          Immunizations:    Td Tdap 6/2021,  Covid:Pf 3/26/202, planned  Flu planned  Shingrix: recommended  Pneumovax:         Seat Belt:YES   Sunscreen use: YES  Calcium Intake: adeq  Health Care Directive:YES   Sexually Active:YES      Current contraception: none     Current Concerns: skin lesion on scalp, new, raised. No bleeding  Tamsulosin not as effective,           Patient Ed:  Reviewed health maintenance including diet, regular exercise, and periodic exams.     The risks, benefits, and treatment options of prescribed medications or other treatments have been discussed with the patient.  The patient should call or schedule a follow up appt if no improvement or other problems.   Labs reviewed in EPIC  BP Readings from Last 3 Encounters:   11/15/21 134/70   09/08/21 132/88   04/21/21 132/84    Wt Readings from Last 3 Encounters:   11/15/21 124.3 kg (274 lb)   09/08/21 122.5 kg (270 lb)   04/21/21 125.6 kg (276 lb 12.8 oz)                  Patient Active Problem List   Diagnosis     Exogenous obesity     Hypertension goal BP (blood pressure) < 150/90     Eczema     Advanced directives, counseling/discussion     Vitamin D deficiency     Colon polyp     Paralyzed hemidiaphragm     Hyperlipidemia LDL goal <130     Venous stasis dermatitis of left lower extremity     Bilateral leg edema     Obesity (BMI 35.0-39.9) with comorbidity (H)     Past Surgical History:   Procedure Laterality Date     ABDOMEN SURGERY       BIOPSY       COLONOSCOPY       COLONOSCOPY WITH CO2 INSUFFLATION N/A 8/18/2016    Procedure: COLONOSCOPY WITH CO2 INSUFFLATION;  Surgeon: Ariel Manzanares MD;  Location: [facility] OR     COLONOSCOPY WITH CO2 INSUFFLATION N/A 9/9/2019    Procedure: COLONOSCOPY, WITH CO2 INSUFFLATION;  Surgeon: Ariel Manzanares MD;  Location: [facility] OR     EYE SURGERY      left  cataract      HERNIA REPAIR       HERNIA REPAIR       TONSILLECTOMY         Social History     Tobacco Use     Smoking status: Former Smoker     Packs/day: 1.00     Years: 10.00     Pack years: 10.00     Types: Cigarettes, Pipe     Start date: 5/15/1968     Quit date: 1983     Years since quittin.2     Smokeless tobacco: Never Used   Substance Use Topics     Alcohol use: Yes     Comment: Minimal to light     Family History   Problem Relation Age of Onset     C.A.D. Mother      Heart Disease Mother      Diabetes Mother      Obesity Mother      Alcohol/Drug Father      Obesity Daughter          Current Outpatient Medications   Medication Sig Dispense Refill     Ascorbic Acid (VITAMIN C PO) Take 2,000 mg by mouth.       aspirin (ASA) 81 MG EC tablet Take 1 tablet (81 mg) by mouth daily       atorvastatin (LIPITOR) 40 MG tablet Take 1 tablet (40 mg) by mouth At Bedtime 90 tablet 3     calcium carbonate (OS-GENE 500 MG [place]) 500 MG tablet Take 1,000 mg by mouth daily       Cyanocobalamin (VITAMIN B-12 PO) Take 1,000 mg by mouth daily       fluticasone (FLONASE) 50 MCG/ACT nasal spray Spray 1 spray into both nostrils daily 1 g 1     lecithin 1200 MG CAPS Take 400 mg by mouth daily        losartan (COZAAR) 50 MG tablet Take 1 tablet (50 mg) by mouth daily 90 tablet 1     metoprolol tartrate (LOPRESSOR) 50 MG tablet Take 1 tablet (50 mg) by mouth 2 times daily 180 tablet 1     Omega-3 Fatty Acids (FISH OIL) 1200 MG capsule Take 2 capsules by mouth daily        tamsulosin (FLOMAX) 0.4 MG capsule Take 2 capsules (0.8 mg) by mouth daily 180 capsule 1     VITAMIN D, CHOLECALCIFEROL, PO Take 5,000 Units by mouth daily       vitamin E 400 UNITS TABS Take 400 Units by mouth daily               Review of Systems  Constitutional, HEENT, cardiovascular, pulmonary, GI, , musculoskeletal, neuro, skin, endocrine and psych systems are negative, except as otherwise noted.    OBJECTIVE:   /70   Pulse 86   Temp 98.4  " F (36.9  C) (Oral)   Resp 20   Ht 1.778 m (5' 10\")   Wt 124.3 kg (274 lb)   SpO2 96%   BMI 39.31 kg/m   Estimated body mass index is 39.31 kg/m  as calculated from the following:    Height as of this encounter: 1.778 m (5' 10\").    Weight as of this encounter: 124.3 kg (274 lb).  Physical Exam  GENERAL: healthy, alert and no distress  EYES: Eyes grossly normal to inspection, PERRL and conjunctivae and sclerae normal  HENT: ear canals and TM's normal, nose and mouth without ulcers or lesions  NECK: no adenopathy, no asymmetry, masses, or scars and thyroid normal to palpation  RESP: lungs clear to auscultation - no rales, rhonchi or wheezes  CV: regular rate and rhythm, normal S1 S2, no S3 or S4, no murmur, click or rub, no peripheral edema and peripheral pulses strong  ABDOMEN: soft, nontender, no hepatosplenomegaly, no masses and bowel sounds normal  MS: no gross musculoskeletal defects noted, no edema  SKIN: no suspicious lesions or rashes, 8 mm raised pink, translucent lesion on scalp,   NEURO: Normal strength and tone, mentation intact and speech normal  PSYCH: mentation appears normal, affect normal/bright    Diagnostic Test Results:  Labs reviewed in Epic    ASSESSMENT / PLAN:   (Z00.00) Encounter for Medicare annual wellness exam  (primary encounter diagnosis)  Comment: preventive needs reviewed   Plan: see orders in Epic.     (I70.0) Atherosclerosis of abdominal aorta (H)  (I65.23) Carotid atherosclerosis, bilateral  Comment: no new symptoms   Plan: atorvastatin (LIPITOR) 40 MG tablet, aspirin         (ASA) 81 MG EC tablet        Continue atorvastatin Refill x 1 yr   Add baby asa     (I10) Hypertension goal BP (blood pressure) < 150/90  Comment: to goal  Plan: losartan (COZAAR) 50 MG tablet, metoprolol         tartrate (LOPRESSOR) 50 MG tablet         Refill x 6 months f/u 6 months for bp check and labs     (N40.1,  R35.0) Benign prostatic hyperplasia with urinary frequency  Comment: not as " "helpful  Plan: tamsulosin (FLOMAX) 0.4 MG capsule        Increase dose to 0.8 mg each night   Refill x 6 months     (E66.01) Morbid obesity (H)  Comment: trending down  Plan: has exercise plan    (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    Weight as of this encounter: 124.3 kg (274 lb).    Weight management plan: Discussed healthy diet and exercise guidelines    He reports that he quit smoking about 38 years ago. His smoking use included cigarettes and pipe. He started smoking about 53 years ago. He has a 10.00 pack-year smoking history. He has never used smokeless tobacco.      Appropriate preventive services were discussed with this patient, including applicable screening as appropriate for cardiovascular disease, diabetes, osteopenia/osteoporosis, and glaucoma.  As appropriate for age/gender, discussed screening for colorectal cancer, prostate cancer, breast cancer, and cervical cancer. Checklist reviewing preventive services available has been given to the patient.    Reviewed patients plan of care and provided an AVS. The Intermediate Care Plan ( asthma action plan, low back pain action plan, and migraine action plan) for Rm meets the Care Plan requirement. This Care Plan has been established and reviewed with the Patient.    Counseling Resources:  ATP IV Guidelines  Pooled Cohorts Equation Calculator  Breast Cancer Risk Calculator  Breast Cancer: Medication to Reduce Risk  FRAX Risk Assessment  ICSI Preventive Guidelines  Dietary Guidelines for " Americans, 2010  USDA's MyPlate  ASA Prophylaxis  Lung CA Screening    Riddhi Lopez MD  [facility]    Identified Health Risks:

## 2021-11-15 ENCOUNTER — OFFICE VISIT (OUTPATIENT)
Dept: FAMILY MEDICINE | Facility: CLINIC | Age: 76
End: 2021-11-15
Payer: COMMERCIAL

## 2021-11-15 VITALS
TEMPERATURE: 98.4 F | WEIGHT: 274 LBS | HEART RATE: 86 BPM | RESPIRATION RATE: 20 BRPM | BODY MASS INDEX: 39.22 KG/M2 | SYSTOLIC BLOOD PRESSURE: 134 MMHG | DIASTOLIC BLOOD PRESSURE: 70 MMHG | HEIGHT: 70 IN | OXYGEN SATURATION: 96 %

## 2021-11-15 DIAGNOSIS — Z00.00 ENCOUNTER FOR MEDICARE ANNUAL WELLNESS EXAM: Primary | ICD-10-CM

## 2021-11-15 DIAGNOSIS — R35.0 BENIGN PROSTATIC HYPERPLASIA WITH URINARY FREQUENCY: ICD-10-CM

## 2021-11-15 DIAGNOSIS — I10 HYPERTENSION GOAL BP (BLOOD PRESSURE) < 150/90: ICD-10-CM

## 2021-11-15 DIAGNOSIS — E66.01 MORBID OBESITY (H): ICD-10-CM

## 2021-11-15 DIAGNOSIS — N40.1 BENIGN PROSTATIC HYPERPLASIA WITH URINARY FREQUENCY: ICD-10-CM

## 2021-11-15 DIAGNOSIS — I65.23 CAROTID ATHEROSCLEROSIS, BILATERAL: ICD-10-CM

## 2021-11-15 DIAGNOSIS — D48.5 NEOPLASM OF UNCERTAIN BEHAVIOR OF SCALP: ICD-10-CM

## 2021-11-15 DIAGNOSIS — I70.0 ATHEROSCLEROSIS OF ABDOMINAL AORTA (H): ICD-10-CM

## 2021-11-15 PROCEDURE — 99213 OFFICE O/P EST LOW 20 MIN: CPT | Mod: 25 | Performed by: FAMILY MEDICINE

## 2021-11-15 PROCEDURE — 99397 PER PM REEVAL EST PAT 65+ YR: CPT | Performed by: FAMILY MEDICINE

## 2021-11-15 RX ORDER — TAMSULOSIN HYDROCHLORIDE 0.4 MG/1
0.8 CAPSULE ORAL DAILY
Qty: 180 CAPSULE | Refills: 1 | Status: SHIPPED | OUTPATIENT
Start: 2021-11-15 | End: 2022-11-04

## 2021-11-15 RX ORDER — LOSARTAN POTASSIUM 50 MG/1
50 TABLET ORAL DAILY
Qty: 90 TABLET | Refills: 1 | Status: SHIPPED | OUTPATIENT
Start: 2021-11-15 | End: 2022-08-29

## 2021-11-15 RX ORDER — METOPROLOL TARTRATE 50 MG
50 TABLET ORAL 2 TIMES DAILY
Qty: 180 TABLET | Refills: 1 | Status: SHIPPED | OUTPATIENT
Start: 2021-11-15 | End: 2022-06-02

## 2021-11-15 RX ORDER — ATORVASTATIN CALCIUM 40 MG/1
40 TABLET, FILM COATED ORAL AT BEDTIME
Qty: 90 TABLET | Refills: 3 | Status: SHIPPED | OUTPATIENT
Start: 2021-11-15 | End: 2022-11-23

## 2021-11-15 ASSESSMENT — PAIN SCALES - GENERAL: PAINLEVEL: NO PAIN (0)

## 2021-11-15 ASSESSMENT — ACTIVITIES OF DAILY LIVING (ADL): CURRENT_FUNCTION: NO ASSISTANCE NEEDED

## 2021-11-15 ASSESSMENT — MIFFLIN-ST. JEOR: SCORE: 1984.11

## 2021-11-15 NOTE — NURSING NOTE
"Chief Complaint   Patient presents with     Wellness Visit       Initial BP (!) 145/92   Pulse 86   Temp 98.4  F (36.9  C) (Oral)   Resp 20   Ht 1.778 m (5' 10\")   Wt 124.3 kg (274 lb)   SpO2 96%   BMI 39.31 kg/m   Estimated body mass index is 39.31 kg/m  as calculated from the following:    Height as of this encounter: 1.778 m (5' 10\").    Weight as of this encounter: 124.3 kg (274 lb).  Medication Reconciliation: complete  Felipe Lucas CMA    "

## 2021-12-29 NOTE — MR AVS SNAPSHOT
After Visit Summary   9/18/2018    Rm Maldonado    MRN: [id number]           Patient Information     Date Of Birth          1945        Visit Information        Provider Department      9/18/2018 8:15 AM Riddhi Lopez MD [facility]        Today's Diagnoses     Lymphedema of both lower extremities    -  1    Morbid obesity (H)          Care Instructions      Ok to use water pill as needed for swelling.        Next visit due in December.          Follow-ups after your visit        Follow-up notes from your care team     Return in about 3 months (around 12/18/2018) for Non-fasting Lab Work, BP Recheck.      Future tests that were ordered for you today     Open Future Orders        Priority Expected Expires Ordered    **Basic metabolic panel FUTURE anytime Routine 9/18/2018 9/18/2019 9/18/2018            Who to contact     If you have questions or need follow up information about today's clinic visit or your schedule please contact [facility] directly at [phone].  Normal or non-critical lab and imaging results will be communicated to you by Adjughart, letter or phone within 4 business days after the clinic has received the results. If you do not hear from us within 7 days, please contact the clinic through Acamicat or phone. If you have a critical or abnormal lab result, we will notify you by phone as soon as possible.  Submit refill requests through Lingorami or call your pharmacy and they will forward the refill request to us. Please allow 3 business days for your refill to be completed.          Additional Information About Your Visit        Adjughart Information     Lingorami gives you secure access to your electronic health record. If you see a primary care provider, you can also send messages to your care team and make appointments. If you have questions, please call your primary care clinic.  If you do not have a primary care provider, please call [phone]  Agnes Valerio is a 81 year old female here for  Chief Complaint   Patient presents with   • Office Visit     Denies latex allergy or sensitivity.    Medication verified, no changes.  PCP and Pharmacy verified.    Social History     Tobacco Use   Smoking Status Never Smoker   Smokeless Tobacco Never Used     Advance Directives Filed: Yes    ECOG:   ECOG [12/29/21 0940]   ECOG Performance Status 1       Vitals:    Visit Vitals  /61   Pulse 68   Wt 82 kg (180 lb 12.4 oz)   SpO2 97%   BMI 30.08 kg/m²       These vital signs are:  Within defined parameters (Per Reference \"Defined Limits Hospital Outpatient Department (HOD)\")    Height: No.  Ht Readings from Last 1 Encounters:   10/06/21 5' 5\" (1.651 m)     Weight:Yes, shoes on.  Wt Readings from Last 3 Encounters:   12/29/21 82 kg (180 lb 12.4 oz)   12/15/21 80 kg (176 lb 5.9 oz)   12/14/21 80.3 kg (177 lb 1.6 oz)       BMI: Body mass index is 30.08 kg/m².    REVIEW OF SYSTEMS  GENERAL:  Patient denies headache, fevers, chills, night sweats, excessive fatigue, change in appetite, weight loss, dizziness  ALLERGIC/IMMUNOLOGIC: Verified allergies: Yes  EYES:  Patient denies significant visual difficulties, double vision, blurred vision  ENT/MOUTH: Patient denies problems with hearing, sore throat, sinus drainage, mouth sores  ENDOCRINE:  Patient denies diabetes, thyroid disease, hormone replacement, hot flashes  HEMATOLOGIC/LYMPHATIC: Patient denies easy bruising, bleeding, tender lymph nodes, swollen lymph nodes  BREASTS: Patient denies abnormal masses of breast, nipple discharge, pain  RESPIRATORY:  Patient denies lung pain with breathing, cough, coughing up blood, shortness of breath  CARDIOVASCULAR:  Patient denies anginal chest pain, palpitations, shortness of breath when lying flat, peripheral edema  GASTROINTESTINAL: Patient denies abdominal pain , nausea, vomiting, diarrhea, GI bleeding, constipation, change in bowel habits, heartburn, sensation of feeling  and they will assist you.        Care EveryWhere ID     This is your Care EveryWhere ID. This could be used by other organizations to access your [facility] medical records  [id number]        Your Vitals Were     Pulse Temperature Respirations Pulse Oximetry BMI (Body Mass Index)       60 97.6  F (36.4  C) (Oral) 20 95% 36.13 kg/m2        Blood Pressure from Last 3 Encounters:   09/18/18 133/87   06/19/18 128/80   06/06/18 130/80    Weight from Last 3 Encounters:   09/18/18 255 lb 6.4 oz (115.8 kg)   06/19/18 261 lb 3.2 oz (118.5 kg)   06/06/18 263 lb (119.3 kg)               Primary Care Provider Office Phone # Fax #    Riddhi Lopez [phone] [id number] [facility] [address]        Equal Access to Services     [facility] Center[facility] : Hadii aad ku hadasho Soomaali, waaxda luqadaha, qaybta kaalmada adeegyada, waxay medardoin hayblairn kendy dawn . So [facility] [phone].    ATENCIÓN: Si habla español, tiene a piedra disposición servicios gratuitos de asistencia lingüística. Idalmis al [phone].    We comply with applicable federal civil rights laws and [place] laws. We do not discriminate on the basis of race, color, national origin, age, disability, sex, sexual orientation, or gender identity.            Thank you!     Thank you for choosing [facility]  for your care. Our goal is always to provide you with excellent care. Hearing back from our patients is one way we can continue to improve our services. Please take a few minutes to complete the written survey that you may receive in the mail after your visit with us. Thank you!             Your Updated Medication List - Protect others around you: Learn how to safely use, store and throw away your medicines at www.disposemymeds.org.          This list is accurate as of 9/18/18  8:53 AM.  Always use your most recent med list.                   Brand Name Dispense Instructions for use Diagnosis    aspirin 325 MG EC tablet  full, difficulty swallowing  : Patient denies abnormal genital masses, blood in the urine, frequency, urgency, burning with urination, hesitancy, incontinence, vaginal bleeding, discharge  MUSCULOSKELETAL:  Patient denies joint pain, bone pain, joint swelling, redness, decreased range of motion  SKIN:  Patient denies chronic rashes, inflammation, ulcerations, skin changes, itching  NEUROLOGIC:  Patient denies loss of balance, areas of focal weakness, abnormal gait, sensory problems, numbness, tingling  PSYCHIATRIC: Patient denies insomnia, depression, anxiety    This patient reported abnormal symptoms that needed immediate verbal communication: No        Take 325 mg by mouth daily.        calcium carbonate 500 mg {elemental} 500 MG tablet    OS-GENE     Take 1,000 mg by mouth daily        fish Oil 1200 MG capsule      Take 2 capsules by mouth daily        FUROSEMIDE PO      Take 20 mg by mouth daily        lecithin 1200 MG Caps capsule    lecithin     Take 400 mg by mouth daily        losartan 50 MG tablet    COZAAR    90 tablet    Take 1 tablet (50 mg) by mouth daily    Hypertension goal BP (blood pressure) < 150/90       metoprolol tartrate 50 MG tablet    LOPRESSOR    180 tablet    Take 1 tablet (50 mg) by mouth 2 times daily    Hypertension goal BP (blood pressure) < 150/90       simvastatin 10 MG tablet    ZOCOR    90 tablet    Take 1 tablet (10 mg) by mouth At Bedtime    Hyperlipidemia LDL goal <130       VITAMIN C PO      Take 2,000 mg by mouth.        VITAMIN D (CHOLECALCIFEROL) PO      Take 5,000 Units by mouth daily        vitamin E 400 units Tabs      Take 400 Units by mouth daily

## 2022-06-01 DIAGNOSIS — I10 HYPERTENSION GOAL BP (BLOOD PRESSURE) < 150/90: ICD-10-CM

## 2022-06-02 ENCOUNTER — MYC MEDICAL ADVICE (OUTPATIENT)
Dept: FAMILY MEDICINE | Facility: CLINIC | Age: 77
End: 2022-06-02
Payer: COMMERCIAL

## 2022-06-02 RX ORDER — METOPROLOL TARTRATE 50 MG
TABLET ORAL
Qty: 180 TABLET | Refills: 1 | Status: SHIPPED | OUTPATIENT
Start: 2022-06-02 | End: 2022-11-22

## 2022-06-08 ENCOUNTER — LAB (OUTPATIENT)
Dept: LAB | Facility: CLINIC | Age: 77
End: 2022-06-08
Payer: COMMERCIAL

## 2022-06-08 DIAGNOSIS — I10 HYPERTENSION GOAL BP (BLOOD PRESSURE) < 150/90: ICD-10-CM

## 2022-06-08 DIAGNOSIS — E78.5 HYPERLIPIDEMIA LDL GOAL <130: ICD-10-CM

## 2022-06-08 LAB
ANION GAP SERPL CALCULATED.3IONS-SCNC: 5 MMOL/L (ref 3–14)
BUN SERPL-MCNC: 18 MG/DL (ref 7–30)
CALCIUM SERPL-MCNC: 8.9 MG/DL (ref 8.5–10.1)
CHLORIDE BLD-SCNC: 103 MMOL/L (ref 94–109)
CHOLEST SERPL-MCNC: 144 MG/DL
CO2 SERPL-SCNC: 30 MMOL/L (ref 20–32)
CREAT SERPL-MCNC: 0.82 MG/DL (ref 0.66–1.25)
FASTING STATUS PATIENT QL REPORTED: NO
GFR SERPL CREATININE-BSD FRML MDRD: >90 ML/MIN/1.73M2
GLUCOSE BLD-MCNC: 88 MG/DL (ref 70–99)
HDLC SERPL-MCNC: 63 MG/DL
LDLC SERPL CALC-MCNC: 67 MG/DL
NONHDLC SERPL-MCNC: 81 MG/DL
POTASSIUM BLD-SCNC: 4.2 MMOL/L (ref 3.4–5.3)
SODIUM SERPL-SCNC: 138 MMOL/L (ref 133–144)
TRIGL SERPL-MCNC: 69 MG/DL

## 2022-06-08 PROCEDURE — 80048 BASIC METABOLIC PNL TOTAL CA: CPT

## 2022-06-08 PROCEDURE — 36415 COLL VENOUS BLD VENIPUNCTURE: CPT

## 2022-06-08 PROCEDURE — 80061 LIPID PANEL: CPT

## 2022-07-25 ENCOUNTER — NURSE TRIAGE (OUTPATIENT)
Dept: NURSING | Facility: CLINIC | Age: 77
End: 2022-07-25

## 2022-07-26 NOTE — TELEPHONE ENCOUNTER
Nurse Triage SBAR    Is this a 2nd Level Triage? NO    Situation:   Fevers and sweating    Background:   Pt had diarrhea for the last 2 days. Now with low grade fevers and diaphoresis. Pt stated he felt dizzy earlier today but now feels better. Says he slept all day. No SOB, no chest pain or pressure.     Assessment:   Poss covid, pt will do home covid test and call back if positive. Encouraged fluids per care advice.     Protocol Recommended Disposition:   Home Care, Routine office f/u appointment      THO RECINOS RN          Reason for Disposition    [1] MODERATE sweating (e.g., interferes with normal activities like work or school; causes embarrassment in social situations) AND [2] involves just face, hands (palms), underarms, or feet (soles) AND [3] present > 4 weeks    [1] Fever AND [2] no signs of serious infection or localizing symptoms (all other triage questions negative)    Additional Information    Negative: Shock suspected (e.g., cold/pale/clammy skin, too weak to stand, low BP, rapid pulse)    Negative: Sounds like a life-threatening emergency to the triager    Negative: Fever    Negative: Weakness    Negative: Chest pain or cardiac ischemia is suspected    Negative: Dizziness and lightheadedness (e.g., feeling woozy, feeling like he/she might faint)    Negative: Heat exhaustion suspected (i.e., dehydration from heat exposure)    Negative: [1] Has diabetes (diabetes mellitus) AND [2] sweating from low blood sugar (i.e., < 70 mg/dl or 3.9 mmol/l)    Negative: Difficulty breathing    Negative: Patient sounds very sick or weak to the triager    Negative: [1] SEVERE sweating (e.g., drenched with sweat) AND [2] cause unknown    Negative: [1] NIGHT SWEATS occur (e.g., drenching sweat that occurs at night and has to change bed clothes or bed sheets) AND [2] cause unknown    Negative: [1] MODERATE sweating (e.g., interferes with normal activities like work or school) AND [2] possibly  related to new medication or change in medication dosage    Negative: [1] Weight loss > 10 pounds (5 kg) AND [2] not dieting    Negative: [1] MODERATE sweating (e.g., interferes with normal activities like work or school) AND [2] cause unknown AND [3]  new onset in the last 4 weeks    Negative: Excessive sweating is a chronic symptom (recurrent or ongoing AND present > 4 weeks)    Negative: [1] MODERATE sweating (e.g., interferes with normal activities like work or sleep) AND [2] menopause is suspected    Negative: Shock suspected (e.g., cold/pale/clammy skin, too weak to stand, low BP, rapid pulse)    Negative: Difficult to awaken or acting confused (e.g., disoriented, slurred speech)    Negative: [1] Difficulty breathing AND [2] bluish lips, tongue or face    Negative: New onset rash with multiple purple (or blood-colored) spots or dots    Negative: Sounds like a life-threatening emergency to the triager    Negative: Fever in a cancer patient who is currently (or recently) receiving chemotherapy or radiation therapy, or cancer patient who has metastatic or end-stage cancer and is receiving palliative care    Negative: Pregnant    Negative: Postpartum (from 0 to 6 weeks after delivery)    Negative: Fever onset within 24 hours of receiving vaccine    Negative: [1] Fever AND [2] within 14 days of COVID-19 Exposure    Negative: Other symptom is present, see that guideline  (e.g., symptoms of cough, runny nose, sore throat, earache, abdominal pain, diarrhea, vomiting)    Negative: [1] Headache AND [2] stiff neck (can't touch chin to chest)    Negative: Difficulty breathing    Negative: IV drug abuse    Negative: Patient sounds very sick or weak to the triager  (Exception: mild weakness and hasn't taken fever medicine)    Negative: Fever > 104 F (40 C)    Negative: [1] Fever > 101 F (38.3 C) AND [2] age > 60    Negative: [1] Fever > 100.0 F (37.8 C) AND [2] bedridden (e.g., nursing home patient, CVA, chronic illness,  recovering from surgery)    Negative: [1] Fever > 100.0 F (37.8 C) AND [2] indwelling urinary catheter (e.g., Chris, Coude)    Negative: [1] Fever > 100.0 F (37.8 C) AND [2] has port (portacath), central line, or PICC line    Negative: [1] Fever > 100.0 F (37.8 C) AND [2] diabetes mellitus or weak immune system (e.g., HIV positive, cancer chemo, splenectomy, organ transplant, chronic steroids)    Negative: [1] Fever > 100.0 F (37.8 C) AND [2] surgery in the last month    Negative: Transplant patient (e.g., kidney, liver, lung, heart)    Negative: Fever present > 3 days (72 hours)    Negative: [1] Fever > 100.0 F (37.8 C) AND [2] foreign travel to a developing country in the last month    Negative: [1] Intermittent fever > 100.0 F (37.8 C) AND [2] lasts > 3 weeks    Protocols used: [id number]-G-BN, FEVER-A-AH

## 2022-08-29 DIAGNOSIS — I10 HYPERTENSION GOAL BP (BLOOD PRESSURE) < 150/90: ICD-10-CM

## 2022-08-29 PROBLEM — H50.53 VERTICAL HETEROPHORIA: Status: ACTIVE | Noted: 2022-08-29

## 2022-08-29 RX ORDER — LOSARTAN POTASSIUM 50 MG/1
TABLET ORAL
Qty: 90 TABLET | Refills: 0 | Status: SHIPPED | OUTPATIENT
Start: 2022-08-29 | End: 2022-11-23

## 2022-11-03 ENCOUNTER — MYC MEDICAL ADVICE (OUTPATIENT)
Dept: FAMILY MEDICINE | Facility: CLINIC | Age: 77
End: 2022-11-03

## 2022-11-03 DIAGNOSIS — R35.0 BENIGN PROSTATIC HYPERPLASIA WITH URINARY FREQUENCY: ICD-10-CM

## 2022-11-03 DIAGNOSIS — N40.1 BENIGN PROSTATIC HYPERPLASIA WITH URINARY FREQUENCY: ICD-10-CM

## 2022-11-04 RX ORDER — TAMSULOSIN HYDROCHLORIDE 0.4 MG/1
0.8 CAPSULE ORAL DAILY
Qty: 180 CAPSULE | Refills: 0 | Status: SHIPPED | OUTPATIENT
Start: 2022-11-04 | End: 2023-01-31

## 2022-11-21 ENCOUNTER — NURSE TRIAGE (OUTPATIENT)
Dept: NURSING | Facility: CLINIC | Age: 77
End: 2022-11-21

## 2022-11-21 DIAGNOSIS — I10 HYPERTENSION GOAL BP (BLOOD PRESSURE) < 150/90: ICD-10-CM

## 2022-11-22 RX ORDER — METOPROLOL TARTRATE 50 MG
100 TABLET ORAL 2 TIMES DAILY
Qty: 180 TABLET | Refills: 1 | COMMUNITY
Start: 2022-11-22 | End: 2022-11-28

## 2022-11-22 NOTE — TELEPHONE ENCOUNTER
"Pt reports he felt light headed and pulse raced a little bit around 3 pm then sat down and checked his blood pressure which was 144/92 jpulse 83. Pt reports he felt flushed and pulse \"picking up\" while watching tv about 8:40 and rechecked his blood pressure and it was 190/100 and pulse 113. Pt reports at time of call \"feeling fine\". Pt reports he takes metoprolol 50 mg twice daily and losartan 50 mg daily in the morning and has not taken his evening dose yet.     Advised Morris to make sure he is drinking enough fluids. Follow up with PCP tomorrow. Call back if new or worsening symptoms and call back protocol reviewed with Morris below.    Morris verbalizes understanding and agrees to plan.     Reason for Disposition    Systolic BP  >= 180 OR Diastolic >= 110    Additional Information    Negative: Difficult to awaken or acting confused (e.g., disoriented, slurred speech)    Negative: SEVERE difficulty breathing (e.g., struggling for each breath, speaks in single words)    Negative: [1] Weakness of the face, arm or leg on one side of the body AND [2] new-onset    Negative: [1] Numbness (i.e., loss of sensation) of the face, arm or leg on one side of the body AND [2] new-onset    Negative: [1] Chest pain lasts > 5 minutes AND [2] history of heart disease (i.e., heart attack, bypass surgery, angina, angioplasty, CHF)    Negative: [1] Chest pain AND [2] took nitrogylcerin AND [3] pain was not relieved    Negative: Sounds like a life-threatening emergency to the triager    Negative: Symptom is main concern (e.g., headache, chest pain)    Negative: Low blood pressure is main concern    Negative: [1] Systolic BP  >= 160 OR Diastolic >= 100 AND [2] cardiac or neurologic symptoms (e.g., chest pain, difficulty breathing, unsteady gait, blurred vision)    Negative: [1] Pregnant 20 or more weeks (or postpartum < 6 weeks) AND [2] new hand or face swelling    Negative: [1] Pregnant 20 or more weeks (or postpartum < 6 weeks) AND [2] " Systolic BP >= 160 OR Diastolic >= 100    Negative: [1] Systolic BP  >= 200 OR Diastolic >= 120 AND [2] having NO cardiac or neurologic symptoms    Negative: [1] Pregnant 20 or more weeks (or postpartum < 6 weeks) AND [2] Systolic BP  >= 140 OR Diastolic >= 90    Negative: [1] Systolic BP  >= 180 OR Diastolic >= 110 AND [2] missed most recent dose of blood pressure medication    Protocols used: BLOOD PRESSURE - HIGH-A-AH

## 2022-11-22 NOTE — TELEPHONE ENCOUNTER
Provider:  Do you have any other recommendations?  Thank you. Elvira Cano R.N.     In calling the patient he reports that he is having no symptoms at all currently. His blood pressure this am, before his medication, was 165/91 P.76. Last night he reports that he came up the stairs and was a little lightheaded. He reports that last night, out in the garage, he got light headed and had an increased heart rate. He stood there for a minute and worked his was into the house. It kind of cleared up after awhile.  At about 8:50 pm. He came in and his blood pressure was 203/101 P.113.  He felt heart palpitation.  Currently no confusion, weakness.      Nursing advice:  Patient was assisted in making an appointment as listed below.  He was advised if he has blood pressure readings 160 systolic and 90 diastolic or higher and no symptoms he is to call us for triage. If he is have any symptoms at all he is to go to the E.R. for assessment.  If he is having blood pressure readings higher than 160 systolic and 90 diastolic and symptoms he is to go to the E.R. for assessment.  Patient verbalized good understanding, agrees with plan and needs no further support.  Thank you. Elvira Cano R.N.    Next 5 appointments (look out 90 days)    Nov 23, 2022  2:00 PM  (Arrive by 1:40 PM)  [facility] Vinny [facility]-C  [facility] [id number] [facility] [address]  [phone]   Dec 22, 2022  1:30 PM  (Arrive by 1:10 PM)  Annual Wellness Visit with Riddhi Lopez MD  [facility] [address] [facility] [address]  [phone]

## 2022-11-22 NOTE — TELEPHONE ENCOUNTER
Can we get pt seen? He is having heart palpitations, dizziness, and high bp than he has ever seen, 203/101.  Rona Ulloa,

## 2022-11-22 NOTE — TELEPHONE ENCOUNTER
Patient informed of provider note as below, patient verbalized understanding    Sandi ROBBINSN, RN

## 2022-11-22 NOTE — TELEPHONE ENCOUNTER
Spoke to patient on the phone. He is feeling better, but is willing to come in to the clinic this week however, patient lives in [place] and, it seems, this message was routed to us accidentally. Routed to [facility]. Ariana Gonzalez RN on 11/22/2022 at 8:38 AM

## 2022-11-22 NOTE — TELEPHONE ENCOUNTER
Provider Response to 2nd Level Triage Request    I have reviewed the RN documentation. My recommendation is:  Face to face visit this week in clinic.  IF persisting palpitations or lightheadedness, or if onset of chest pain or shortness of breath, needsz to be seen emergently.  BROOKEJ

## 2022-11-22 NOTE — TELEPHONE ENCOUNTER
Agree with plan.  Would recommend pt increase metoprolol dose to 100 mg twice daily starting with tonight's dose (take 2 of his current tablets) and continue that until seen tomorrow.

## 2022-11-23 ENCOUNTER — OFFICE VISIT (OUTPATIENT)
Dept: FAMILY MEDICINE | Facility: CLINIC | Age: 77
End: 2022-11-23
Payer: COMMERCIAL

## 2022-11-23 VITALS
SYSTOLIC BLOOD PRESSURE: 144 MMHG | RESPIRATION RATE: 14 BRPM | HEART RATE: 66 BPM | HEIGHT: 71 IN | WEIGHT: 261 LBS | TEMPERATURE: 97.3 F | BODY MASS INDEX: 36.54 KG/M2 | OXYGEN SATURATION: 96 % | DIASTOLIC BLOOD PRESSURE: 80 MMHG

## 2022-11-23 DIAGNOSIS — I70.0 ATHEROSCLEROSIS OF ABDOMINAL AORTA (H): ICD-10-CM

## 2022-11-23 DIAGNOSIS — E66.01 MORBID OBESITY (H): ICD-10-CM

## 2022-11-23 DIAGNOSIS — R00.0 RACING HEART BEAT: ICD-10-CM

## 2022-11-23 DIAGNOSIS — R23.2 FLUSHING: ICD-10-CM

## 2022-11-23 DIAGNOSIS — I65.23 CAROTID ATHEROSCLEROSIS, BILATERAL: ICD-10-CM

## 2022-11-23 DIAGNOSIS — I10 HYPERTENSION GOAL BP (BLOOD PRESSURE) < 150/90: Primary | ICD-10-CM

## 2022-11-23 LAB
ANION GAP SERPL CALCULATED.3IONS-SCNC: 6 MMOL/L (ref 3–14)
BUN SERPL-MCNC: 19 MG/DL (ref 7–30)
CALCIUM SERPL-MCNC: 8.9 MG/DL (ref 8.5–10.1)
CHLORIDE BLD-SCNC: 106 MMOL/L (ref 94–109)
CO2 SERPL-SCNC: 29 MMOL/L (ref 20–32)
CREAT SERPL-MCNC: 0.86 MG/DL (ref 0.66–1.25)
GFR SERPL CREATININE-BSD FRML MDRD: 90 ML/MIN/1.73M2
GLUCOSE BLD-MCNC: 96 MG/DL (ref 70–99)
POTASSIUM BLD-SCNC: 4.1 MMOL/L (ref 3.4–5.3)
SODIUM SERPL-SCNC: 141 MMOL/L (ref 133–144)

## 2022-11-23 PROCEDURE — 99214 OFFICE O/P EST MOD 30 MIN: CPT | Performed by: PHYSICIAN ASSISTANT

## 2022-11-23 PROCEDURE — 93000 ELECTROCARDIOGRAM COMPLETE: CPT | Performed by: PHYSICIAN ASSISTANT

## 2022-11-23 PROCEDURE — 36415 COLL VENOUS BLD VENIPUNCTURE: CPT | Performed by: PHYSICIAN ASSISTANT

## 2022-11-23 PROCEDURE — 80048 BASIC METABOLIC PNL TOTAL CA: CPT | Performed by: PHYSICIAN ASSISTANT

## 2022-11-23 RX ORDER — LOSARTAN POTASSIUM 50 MG/1
50 TABLET ORAL DAILY
Qty: 90 TABLET | Refills: 1 | Status: SHIPPED | OUTPATIENT
Start: 2022-11-23 | End: 2023-01-31

## 2022-11-23 RX ORDER — ATORVASTATIN CALCIUM 40 MG/1
40 TABLET, FILM COATED ORAL AT BEDTIME
Qty: 90 TABLET | Refills: 3 | Status: SHIPPED | OUTPATIENT
Start: 2022-11-23 | End: 2023-12-27

## 2022-11-23 ASSESSMENT — PAIN SCALES - GENERAL: PAINLEVEL: NO PAIN (0)

## 2022-11-23 NOTE — PROGRESS NOTES
Assessment & Plan       ICD-10-CM    1. Hypertension goal BP (blood pressure) < 150/90  I10 EKG 12-lead complete w/read - Clinics     losartan (COZAAR) 50 MG tablet     Basic metabolic panel  (Ca, Cl, CO2, Creat, Gluc, K, Na, BUN)      2. Flushing  R23.2 EKG 12-lead complete w/read - Clinics      3. Racing heart beat  R00.0 EKG 12-lead complete w/read - Clinics      4. Atherosclerosis of abdominal aorta (H)  I70.0 atorvastatin (LIPITOR) 40 MG tablet      5. Carotid atherosclerosis, bilateral  I65.23 atorvastatin (LIPITOR) 40 MG tablet      6. Morbid obesity (H)  E66.01       Talk to patient about his concerns.  EKG stable today.  Labs are pending.  He just made a change to his metoprolol dosage.  His blood pressure has improved.  He is asymptomatic today.  We talked about good nutrition and hydration.  Were going to monitor his symptoms if this becomes a recurrent problem then we will see him again at that time.  Follow up  Dependant on labs.     Return for recheck.    Vinny Hill PA-C  [facility]   Morris is a 77 year old accompanied by his self, presenting for the following health issues:  Hypertension      History of Present Illness       Hypertension: He presents for follow up of hypertension.  He does check blood pressure  regularly outside of the clinic. Outside blood pressures have been over 140/90. He follows a low salt diet.     higher blood pressure readings at home. Had an episode of flush feeling and racy heart for about 10 minutes. No chest pain or short of breath.  He admits that he had not been drinking much water that day.  He went to drink some water and shortly after felt better.  Called RN and she talked to Dr. Lopez and told to increase metoprolol 100mg twice a day.  Overall he is feeling good today.  He denies any problems like this in the past.    Annual Wellness Visit    Patient has been advised of split billing requirements and indicates  "understanding: Yes     Are you in the first 12 months of your Medicare Part B coverage?  No    Physical Health:    In general, how would you rate your overall physical health? good    Outside of work, how many days during the week do you exercise?none    Outside of work, approximately how many minutes a day do you exercise?not applicable  If you drink alcohol do you typically have >3 drinks per day or >7 drinks per week? Yes - AUDIT SCORE:    <6 per months        Do you usually eat at least 4 servings of fruit and vegetables a day, include whole grains & fiber and avoid regularly eating high fat or \"junk\" foods? NO    Do you have any problems taking medications regularly? No    Do you have any side effects from medications? none    Needs assistance for the following daily activities: no assistance needed    Which of the following safety concerns are present in your home?  none identified     Hearing impairment: No    In the past 6 months, have you been bothered by leaking of urine? yes    Mental Health:    In general, how would you rate your overall mental or emotional health? good  PHQ-2 Score: 0    Do you feel safe in your environment? Yes    Have you ever done Advance Care Planning? (For example, a Health Directive, POLST, or a discussion with a medical provider or your loved ones about your wishes)? No, advance care planning information given to patient to review.  Patient declined advance care planning discussion at this time.    Fall risk:  Fallen 2 or more times in the past year?: No  Any fall with injury in the past year?: No    Cognitive Screening: No cognitive impairment noted on exam today.        Current providers sharing in care for this patient include:   Patient Care Team:  Riddhi Lopez MD as PCP - General (Family Practice)  Riddhi Lopez MD as Assigned PCP    Patient has been advised of split billing requirements and indicates understanding: Yes    Review of Systems   Constitutional, " "HEENT, cardiovascular, pulmonary, gi and gu systems are negative, except as otherwise noted.      Objective    BP (!) 144/80   Pulse 66   Temp 97.3  F (36.3  C) (Tympanic)   Resp 14   Ht 1.803 m (5' 11\")   Wt 118.4 kg (261 lb)   SpO2 96%   BMI 36.40 kg/m    Body mass index is 36.4 kg/m .  Physical Exam   GENERAL: healthy, alert and no distress  RESP: lungs clear to auscultation - no rales, rhonchi or wheezes  CV: regular rate and rhythm, normal S1 S2, no S3 or S4, no murmur, click or rub, no peripheral edema and peripheral pulses strong  ABDOMEN: soft, nontender, no hepatosplenomegaly, no masses and bowel sounds normal  MS: no gross musculoskeletal defects noted, no edema    EKG: NSR.  Low voltage limb leads.  Similar readings from 2017.  Labs pending               "

## 2022-11-27 ENCOUNTER — MYC MEDICAL ADVICE (OUTPATIENT)
Dept: FAMILY MEDICINE | Facility: CLINIC | Age: 77
End: 2022-11-27

## 2022-11-27 DIAGNOSIS — I10 HYPERTENSION GOAL BP (BLOOD PRESSURE) < 150/90: ICD-10-CM

## 2022-11-28 RX ORDER — METOPROLOL TARTRATE 100 MG
100 TABLET ORAL 2 TIMES DAILY
Qty: 180 TABLET | Refills: 1 | Status: SHIPPED | OUTPATIENT
Start: 2022-11-28 | End: 2023-06-02

## 2022-12-22 ENCOUNTER — NURSE TRIAGE (OUTPATIENT)
Dept: FAMILY MEDICINE | Facility: CLINIC | Age: 77
End: 2022-12-22

## 2022-12-22 ENCOUNTER — VIRTUAL VISIT (OUTPATIENT)
Dept: FAMILY MEDICINE | Facility: CLINIC | Age: 77
End: 2022-12-22
Payer: COMMERCIAL

## 2022-12-22 DIAGNOSIS — U07.1 INFECTION DUE TO 2019 NOVEL CORONAVIRUS: Primary | ICD-10-CM

## 2022-12-22 DIAGNOSIS — E66.01 MORBID OBESITY (H): ICD-10-CM

## 2022-12-22 DIAGNOSIS — I10 HYPERTENSION GOAL BP (BLOOD PRESSURE) < 150/90: ICD-10-CM

## 2022-12-22 PROCEDURE — 99213 OFFICE O/P EST LOW 20 MIN: CPT | Mod: 95 | Performed by: INTERNAL MEDICINE

## 2022-12-22 RX ORDER — NIRMATRELVIR AND RITONAVIR 300-100 MG
3 KIT ORAL 2 TIMES DAILY
Qty: 30 EACH | Refills: 0 | Status: SHIPPED | OUTPATIENT
Start: 2022-12-22 | End: 2023-01-23

## 2022-12-22 NOTE — TELEPHONE ENCOUNTER
Patient called to clinic to report positive Covid test at home today. Patient denies covid exposure but reports wife was sick recently with cough, sinus sx, sore throat but is better now. Patient's wife did not get tested for covid and patient's grandson had recent strep throat infection.     Patient c/o low grade fever 99.8, sore throat, body aches, occasional cough to clear mucous. Patient worst sx is sore throat and states feels sore deeper in the throat when swallowing.Patient states sx are staying the same since it started but cool air does seem to help sx. Patient denies trouble breathing, shortness of breath, or congestion.      Patient reports blood pressure was elevated 2 weeks ago and saw provider who did increased metoprolol and has help. Patient states systolic 150's and diastolic under 90. Patient states he did get all of covid vaccine except for omicron booster.     Patient informed of Paxlovid treatment available for patient who are at higher risk for complication of Covid. Due to recent elevated BP, RN advise provider visit for assessment and treatment prescription, patient agreed and open to another provider beside PCP.    Patient scheduled for virtual visit today 12/22/2022.     VAL Michelle  [facility] Triage  [place]                         Reason for Disposition    [1] COVID-19 diagnosed by positive lab test (e.g., PCR, rapid self-test kit) AND [2] mild symptoms (e.g., cough, fever, others) AND [3] no complications or SOB    Additional Information    Negative: SEVERE difficulty breathing (e.g., struggling for each breath, speaks in single words)    Negative: Difficult to awaken or acting confused (e.g., disoriented, slurred speech)    Negative: Bluish (or gray) lips or face now    Negative: Shock suspected (e.g., cold/pale/clammy skin, too weak to stand, low BP, rapid pulse)    Negative: Sounds like a life-threatening emergency to the triager    Negative: SEVERE or constant chest pain or  "pressure  (Exception: Mild central chest pain, present only when coughing.)    Negative: MODERATE difficulty breathing (e.g., speaks in phrases, SOB even at rest, pulse 100-120)    Negative: Headache and stiff neck (can't touch chin to chest)    Negative: Oxygen level (e.g., pulse oximetry) 90 percent or lower    Negative: Chest pain or pressure    Negative: Patient sounds very sick or weak to the triager    Negative: MILD difficulty breathing (e.g., minimal/no SOB at rest, SOB with walking, pulse <100)    Negative: Fever > 103 F (39.4 C)    Negative: [1] Fever > 101 F (38.3 C) AND [2] over 60 years of age    Negative: [1] Fever > 100.0 F (37.8 C) AND [2] bedridden (e.g., nursing home patient, CVA, chronic illness, recovering from surgery)    Negative: HIGH RISK for severe COVID complications (e.g., weak immune system, age > 64 years, obesity with BMI > 25, pregnant, chronic lung disease or other chronic medical condition) (Exception: Already seen by PCP and no new or worsening symptoms.)    Negative: [1] HIGH RISK patient AND [2] influenza is widespread in the community AND [3] ONE OR MORE respiratory symptoms: cough, sore throat, runny or stuffy nose    Negative: [1] HIGH RISK patient AND [2] influenza exposure within the last 7 days AND [3] ONE OR MORE respiratory symptoms: cough, sore throat, runny or stuffy nose    Negative: Oxygen level (e.g., pulse oximetry) 91 to 94 percent    Answer Assessment - Initial Assessment Questions  1. COVID-19 DIAGNOSIS: \"Who made your COVID-19 diagnosis?\" \"Was it confirmed by a positive lab test or self-test?\" If not diagnosed by a doctor (or NP/PA), ask \"Are there lots of cases (community spread) where you live?\" Note: See public health department website, if unsure.      Positive test at home  2. COVID-19 EXPOSURE: \"Was there any known exposure to COVID before the symptoms began?\" CDC Definition of close contact: within 6 feet (2 meters) for a total of 15 minutes or more over a " "24-hour period.       No exposure, wife has been sick but had sinus infection (cough) sore throat but she is better now. Grandson had strep throat infection.   3. ONSET: \"When did the COVID-19 symptoms start?\"       Sunday 12/18/22   4. WORST SYMPTOM: \"What is your worst symptom?\" (e.g., cough, fever, shortness of breath, muscle aches)      Sore throat and feels deeper in the throat. No trouble breathing shortness of breath, no congestion  5. COUGH: \"Do you have a cough?\" If Yes, ask: \"How bad is the cough?\"        Yes, seems to occasional to clear throat with mucous  6. FEVER: \"Do you have a fever?\" If Yes, ask: \"What is your temperature, how's it measured, and when did it start?\"      99.8 today  7. RESPIRATORY STATUS: \"Describe your breathing?\" (e.g., shortness of breath, wheezing, unable to speak)       No   8. BETTER-SAME-WORSE: \"Are you getting better, staying the same or getting worse compared to yesterday?\"  If getting worse, ask, \"In what way?\"      Staying the same, blood pressure was elevated 2 weeks ago. Increased metoprolol and has help but still running under 150/90  9. HIGH RISK DISEASE: \"Do you have any chronic medical problems?\" (e.g., asthma, heart or lung disease, weak immune system, obesity, etc.)      HTN, obesity  10. VACCINE: \"Have you had the COVID-19 vaccine?\" If Yes, ask: \"Which one, how many shots, when did you get it?\"       Yes all doses  11. BOOSTER: \"Have you received your COVID-19 booster?\" If Yes, ask: \"Which one and when did you get it?\"       No   12. PREGNANCY: \"Is there any chance you are pregnant?\" \"When was your last menstrual period?\"        N/a.   13. OTHER SYMPTOMS: \"Do you have any other symptoms?\"  (e.g., chills, fatigue, headache, loss of smell or taste, muscle pain, sore throat)       Body aches, soret throat.   14. O2 SATURATION MONITOR:  \"Do you use an oxygen saturation monitor (pulse oximeter) at home?\" If Yes, ask \"What is your reading (oxygen level) today?\" \"What is " "your usual oxygen saturation reading?\" (e.g., 95%)        No    Protocols used: CORONAVIRUS (COVID-19) DIAGNOSED OR [id number] 1.18.2022      "

## 2022-12-22 NOTE — PROGRESS NOTES
"Morris is a 77 year old who is being evaluated via a billable video visit.      How would you like to obtain your AVS? MyChart  If the video visit is dropped, the invitation should be resent by: Text to cell phone: [phone]  Will anyone else be joining your video visit? No        Assessment & Plan     1.  Infection due to SARS-CoV-2. Paxlovid prescribed.  Drug drug interaction discussed.  Side effects discussed.  Rebound COVID discussed.  Patient advised to stop vitamin C.  Also advised the stop atorvastatin for 5 days while he is on Paxlovid.  2.  Hypertension well-controlled.  3.  Obesity     BMI:   Estimated body mass index is 36.4 kg/m  as calculated from the following:    Height as of 11/23/22: 1.803 m (5' 11\").    Weight as of 11/23/22: 118.4 kg (261 lb).     Return in about 2 weeks (around 1/5/2023) for prn.    Brayan Muro MD  [facility]   Morris is a 77 year old, presenting for the following health issues:  Covid Concern      HPI       COVID-19 Symptom Review  How many days ago did these symptoms start? 12/18/22    Are any of the following symptoms significant for you?    New or worsening difficulty breathing? No    Worsening cough? Yes, I am coughing up mucus.    Fever or chills? Yes, I felt feverish or had chills.    Headache: No    Sore throat: YES    Chest pain: No    Diarrhea: No    Body aches? YES    What treatments has patient tried? Iburofen, mucinex   Does patient live in a nursing home, group home, or shelter? No  Does patient have a way to get food/medications during quarantined? Yes, I have a friend or family member who can help me.    77-year-old gentleman with history of hypertension and dyslipidemia presents with 3-day history of cough, sore throat and body ache.  Has flulike symptoms.  He tested positive for COVID today.  He felt he should get antiviral therapy.  He does not have shortness of breath.    Review of Systems   All other systems reviewed and " are negative.     Constitutional, HEENT, cardiovascular, pulmonary, GI, , musculoskeletal, neuro, skin, endocrine and psych systems are negative, except as otherwise noted.      Objective           Vitals:  No vitals were obtained today due to virtual visit.    Physical Exam   GENERAL: Healthy, alert and no distress  EYES: Eyes grossly normal to inspection.  No discharge or erythema, or obvious scleral/conjunctival abnormalities.  RESP: No audible wheeze, cough, or visible cyanosis.  No visible retractions or increased work of breathing.    SKIN: Visible skin clear. No significant rash, abnormal pigmentation or lesions.  NEURO: Cranial nerves grossly intact.  Mentation and speech appropriate for age.  PSYCH: Mentation appears normal, affect normal/bright, judgement and insight intact, normal speech and appearance well-groomed.                Video-Visit Details    Type of service:  Video Visit   Video Start Time: 5:32 PM  Video End Time:5:38 PM    Originating Location (pt. Location): Home    Distant Location (provider location):  On-site  Platform used for Video Visit: Jordan

## 2023-01-31 ENCOUNTER — OFFICE VISIT (OUTPATIENT)
Dept: FAMILY MEDICINE | Facility: CLINIC | Age: 78
End: 2023-01-31
Payer: COMMERCIAL

## 2023-01-31 VITALS
OXYGEN SATURATION: 95 % | BODY MASS INDEX: 36.4 KG/M2 | TEMPERATURE: 98.3 F | HEIGHT: 71 IN | SYSTOLIC BLOOD PRESSURE: 156 MMHG | HEART RATE: 72 BPM | WEIGHT: 260 LBS | RESPIRATION RATE: 17 BRPM | DIASTOLIC BLOOD PRESSURE: 90 MMHG

## 2023-01-31 DIAGNOSIS — E66.01 MORBID OBESITY (H): ICD-10-CM

## 2023-01-31 DIAGNOSIS — R35.0 BENIGN PROSTATIC HYPERPLASIA WITH URINARY FREQUENCY: ICD-10-CM

## 2023-01-31 DIAGNOSIS — E78.5 HYPERLIPIDEMIA LDL GOAL <130: ICD-10-CM

## 2023-01-31 DIAGNOSIS — N40.1 BENIGN PROSTATIC HYPERPLASIA WITH URINARY FREQUENCY: ICD-10-CM

## 2023-01-31 DIAGNOSIS — I10 HYPERTENSION GOAL BP (BLOOD PRESSURE) < 140/90: ICD-10-CM

## 2023-01-31 DIAGNOSIS — Z00.00 ENCOUNTER FOR MEDICARE ANNUAL WELLNESS EXAM: Primary | ICD-10-CM

## 2023-01-31 DIAGNOSIS — I70.0 ATHEROSCLEROSIS OF ABDOMINAL AORTA (H): ICD-10-CM

## 2023-01-31 PROBLEM — H25.811 COMBINED FORMS OF AGE-RELATED CATARACT, RIGHT EYE: Status: ACTIVE | Noted: 2023-01-31

## 2023-01-31 PROCEDURE — 99214 OFFICE O/P EST MOD 30 MIN: CPT | Mod: 25 | Performed by: FAMILY MEDICINE

## 2023-01-31 PROCEDURE — G0439 PPPS, SUBSEQ VISIT: HCPCS | Performed by: FAMILY MEDICINE

## 2023-01-31 RX ORDER — LOSARTAN POTASSIUM 100 MG/1
100 TABLET ORAL DAILY
Qty: 90 TABLET | Refills: 1 | Status: SHIPPED | OUTPATIENT
Start: 2023-01-31 | End: 2023-04-05

## 2023-01-31 RX ORDER — TAMSULOSIN HYDROCHLORIDE 0.4 MG/1
0.4 CAPSULE ORAL DAILY
Qty: 90 CAPSULE | Refills: 3 | Status: SHIPPED | OUTPATIENT
Start: 2023-01-31 | End: 2024-03-15

## 2023-01-31 ASSESSMENT — ENCOUNTER SYMPTOMS
DIZZINESS: 0
CHILLS: 0
ARTHRALGIAS: 1
WEAKNESS: 0
DIARRHEA: 0
EYE PAIN: 1
COUGH: 0
MYALGIAS: 0
NERVOUS/ANXIOUS: 0
NAUSEA: 0
FREQUENCY: 0
DYSURIA: 0
ABDOMINAL PAIN: 0
SORE THROAT: 0
PALPITATIONS: 0
PARESTHESIAS: 0
SHORTNESS OF BREATH: 0
HEMATOCHEZIA: 0
HEMATURIA: 0
HEADACHES: 0
FEVER: 0
CONSTIPATION: 0
JOINT SWELLING: 0
HEARTBURN: 0

## 2023-01-31 ASSESSMENT — PATIENT HEALTH QUESTIONNAIRE - PHQ9
SUM OF ALL RESPONSES TO PHQ QUESTIONS 1-9: 2
SUM OF ALL RESPONSES TO PHQ QUESTIONS 1-9: 2
10. IF YOU CHECKED OFF ANY PROBLEMS, HOW DIFFICULT HAVE THESE PROBLEMS MADE IT FOR YOU TO DO YOUR WORK, TAKE CARE OF THINGS AT HOME, OR GET ALONG WITH OTHER PEOPLE: NOT DIFFICULT AT ALL

## 2023-01-31 ASSESSMENT — PAIN SCALES - GENERAL: PAINLEVEL: NO PAIN (0)

## 2023-01-31 ASSESSMENT — ACTIVITIES OF DAILY LIVING (ADL): CURRENT_FUNCTION: NO ASSISTANCE NEEDED

## 2023-01-31 NOTE — PATIENT INSTRUCTIONS
Patient Education   Personalized Prevention Plan  You are due for the preventive services outlined below.  Your care team is available to assist you in scheduling these services.  If you have already completed any of these items, please share that information with your care team to update in your medical record.  Health Maintenance Due   Topic Date Due    ANNUAL REVIEW OF HM ORDERS  Never done    Zoster (Shingles) Vaccine (1 of 2) Never done    Pneumococcal Vaccine (1 - PCV) Never done    COVID-19 Vaccine (5 - Booster for Pfizer series) 07/09/2022    Flu Vaccine (1) 09/01/2022          Increase losartan to 100 mg daily.    Check home blood pressures after on the new dose of losartan for 2 weeks and send the readings to Riddhi Lopez MD via [employer] message.

## 2023-01-31 NOTE — PROGRESS NOTES
"SUBJECTIVE:   Morris is a 77 year old who presents for Preventive Visit.    Patient has been advised of split billing requirements and indicates understanding: Yes  Are you in the first 12 months of your Medicare coverage?  No    Healthy Habits:     In general, how would you rate your overall health?  Fair    Frequency of exercise:  None    Do you usually eat at least 4 servings of fruit and vegetables a day, include whole grains    & fiber and avoid regularly eating high fat or \"junk\" foods?  No    Taking medications regularly:  Yes    Medication side effects:  None    Ability to successfully perform activities of daily living:  No assistance needed    Home Safety:  No safety concerns identified    Hearing Impairment:  Difficulty following a conversation in a noisy restaurant or crowded room, difficult to understand a speaker at a public meeting or Church service, difficulty understanding soft or whispered speech and difficulty understanding speech on the telephone    In the past 6 months, have you been bothered by leaking of urine? Yes    In general, how would you rate your overall mental or emotional health?  Fair      PHQ-2 Total Score: 0    Additional concerns today:  No      Have you ever done Advance Care Planning? (For example, a Health Directive, POLST, or a discussion with a medical provider or your loved ones about your wishes): No, advance care planning information given to patient to review.  Patient declined advance care planning discussion at this time.       Fall risk  Fallen 2 or more times in the past year?: No  Any fall with injury in the past year?: No    Cognitive Screening Normal cognition based on my direct observation during interview and exam.     Do you have sleep apnea, excessive snoring or daytime drowsiness?: no    Reviewed and updated as needed this visit by clinical staff   Tobacco  Allergies  Meds  Problems  Med Hx  Surg Hx  Fam Hx          Reviewed and updated as needed this " visit by Provider   Tobacco  Allergies  Meds  Problems  Med Hx  Surg Hx  Fam Hx         Social History     Tobacco Use     Smoking status: Former     Packs/day: 1.00     Years: 10.00     Pack years: 10.00     Types: Cigarettes, Pipe     Start date: 5/15/1968     Quit date: 1983     Years since quittin.4     Smokeless tobacco: Never   Substance Use Topics     Alcohol use: Yes     Comment: Minimal to light     If you drink alcohol do you typically have >3 drinks per day or >7 drinks per week? No    Alcohol Use 2023   Prescreen: >3 drinks/day or >7 drinks/week? No   Prescreen: >3 drinks/day or >7 drinks/week? -           Current providers sharing in care for this patient include:   Patient Care Team:  Riddhi Lopez MD as PCP - General (Family Practice)  Riddhi Lopez MD as Assigned PCP    The following health maintenance items are reviewed in Epic and correct as of today:  Health Maintenance   Topic Date Due     ANNUAL REVIEW OF HM ORDERS  Never done     ZOSTER IMMUNIZATION (1 of 2) Never done     Pneumococcal Vaccine: 65+ Years (1 - PCV) Never done     COVID-19 Vaccine (5 - Booster for Pfizer series) 2022     INFLUENZA VACCINE (1) 2022     BMP  2023     LIPID  2023     MEDICARE ANNUAL WELLNESS VISIT  2024     FALL RISK ASSESSMENT  2024     COLORECTAL CANCER SCREENING  2024     ADVANCE CARE PLANNING  2028     DTAP/TDAP/TD IMMUNIZATION (3 - Td or Tdap) 06/10/2031     HEPATITIS C SCREENING  Completed     PHQ-2 (once per calendar year)  Completed     IPV IMMUNIZATION  Aged Out     MENINGITIS IMMUNIZATION  Aged Out     LUNG CANCER SCREENING  Discontinued         Family history of prostate cancer: No  Last PSA:   PSA   Date Value Ref Range Status   10/28/2020 2.44 0 - 4 ug/L Final     Comment:     Assay Method:  Chemiluminescence using Siemens Vista analyzer     Doing self testicular exam: YES     Family history of colon cancer:No  Last  "colonscopy: 9/2019 q5y scope     Family history of CAD:Yes mother  Last Cholesterol:   Lab Results   Component Value Date    CHOL 144 06/08/2022    CHOL 165 04/21/2021     Lab Results   Component Value Date    HDL 63 06/08/2022    HDL 63 04/21/2021     Lab Results   Component Value Date    LDL 67 06/08/2022    LDL 80 04/21/2021     Lab Results   Component Value Date    TRIG 69 06/08/2022    TRIG 112 04/21/2021     Lab Results   Component Value Date    CHOLHDLRATIO 3.6 10/26/2015          Immunizations:    Td Tdap 6/2021  Covid:Pf boost#2 5/14/2022  Flu 3/2019  Shingrix: declines  Pneumovax declines,     Seat Belt:YES   Sunscreen use: YES  Calcium Intake: adeq  Health Care Directive:YES   Sexually Active:YES      Current contraception: none     Current Concerns:      Hypertension  He reports his blood medication was changed six weeks ago. Since his blood pressure has been 140-150's/80-90's. He has a home blood pressure cuff and checked it daily until he got sick \"having diarrhea\"  a week ago and he stopped. He will start checking it again.He denies headaches, shortness of breath, palpitations, fatigue or vision changes. He reports he does not exercise, and does not have activities that interests him.He reports his activity these days is cleaning the snow off his drive.       Patient Ed:  Reviewed health maintenance including diet, regular exercise, and periodic exams.     The risks, benefits, and treatment options of prescribed medications or other treatments have been discussed with the patient.  The patient should call or schedule a follow up appt if no improvement or other problems.   Labs reviewed in EPIC  BP Readings from Last 3 Encounters:   01/31/23 (!) 156/90   11/23/22 (!) 144/80   11/15/21 134/70    Wt Readings from Last 3 Encounters:   01/31/23 117.9 kg (260 lb)   11/23/22 118.4 kg (261 lb)   11/15/21 124.3 kg (274 lb)                  Patient Active Problem List   Diagnosis     Hypertension goal BP (blood " pressure) < 150/90     Eczema     Advanced directives, counseling/discussion     Vitamin D deficiency     Colon polyp     Paralyzed hemidiaphragm     Hyperlipidemia LDL goal <130     Venous stasis dermatitis of left lower extremity     Bilateral leg edema     Obesity (BMI 35.0-39.9) with comorbidity (H)     Vertical heterophoria     Carotid atherosclerosis, bilateral     Atherosclerosis of abdominal aorta (H)     Combined forms of age-related cataract, right eye     Past Surgical History:   Procedure Laterality Date     ABDOMEN SURGERY       BIOPSY       COLONOSCOPY       COLONOSCOPY WITH CO2 INSUFFLATION N/A 2016    Procedure: COLONOSCOPY WITH CO2 INSUFFLATION;  Surgeon: Ariel Manzanares MD;  Location: [facility] OR     COLONOSCOPY WITH CO2 INSUFFLATION N/A 2019    Procedure: COLONOSCOPY, WITH CO2 INSUFFLATION;  Surgeon: Ariel Manzanares MD;  Location: [facility] OR     EYE SURGERY      left cataract      HERNIA REPAIR       HERNIA REPAIR       TONSILLECTOMY         Social History     Tobacco Use     Smoking status: Former     Packs/day: 1.00     Years: 10.00     Pack years: 10.00     Types: Cigarettes, Pipe     Start date: 5/15/1968     Quit date: 1983     Years since quittin.4     Smokeless tobacco: Never   Substance Use Topics     Alcohol use: Yes     Comment: Minimal to light     Family History   Problem Relation Age of Onset     C.A.D. Mother      Heart Disease Mother      Diabetes Mother      Obesity Mother      Alcohol/Drug Father      Obesity Daughter          Current Outpatient Medications   Medication Sig Dispense Refill     Ascorbic Acid (VITAMIN C PO) Take 2,000 mg by mouth.       aspirin (ASA) 81 MG EC tablet Take 1 tablet (81 mg) by mouth daily       atorvastatin (LIPITOR) 40 MG tablet Take 1 tablet (40 mg) by mouth At Bedtime 90 tablet 3     calcium carbonate (OS-GENE 500 MG [place]. [place]) 500 MG tablet Take 1,000 mg by mouth daily       Cyanocobalamin (VITAMIN B-12 PO) Take 1,000 mg by  "mouth daily       lecithin 1200 MG CAPS Take 400 mg by mouth daily        losartan (COZAAR) 50 MG tablet Take 1 tablet (50 mg) by mouth daily 90 tablet 1     metoprolol tartrate (LOPRESSOR) 100 MG tablet Take 1 tablet (100 mg) by mouth 2 times daily 180 tablet 1     Omega-3 Fatty Acids (FISH OIL) 1200 MG capsule Take 2 capsules by mouth daily        tamsulosin (FLOMAX) 0.4 MG capsule Take 2 capsules (0.8 mg) by mouth daily 180 capsule 0     VITAMIN D, CHOLECALCIFEROL, PO Take 5,000 Units by mouth daily       vitamin E 400 UNITS TABS Take 400 Units by mouth daily               Review of Systems   Constitutional: Negative for chills and fever.   HENT: Positive for hearing loss. Negative for congestion and sore throat.    Eyes: Positive for pain. Negative for visual disturbance.   Respiratory: Negative for cough and shortness of breath.    Cardiovascular: Negative for chest pain, palpitations and peripheral edema.   Gastrointestinal: Negative for abdominal pain, constipation, diarrhea, heartburn, hematochezia and nausea.   Genitourinary: Negative for dysuria, frequency, genital sores, hematuria and urgency.   Musculoskeletal: Positive for arthralgias. Negative for joint swelling and myalgias.   Skin: Negative for rash.   Neurological: Negative for dizziness, weakness, headaches and paresthesias.   Psychiatric/Behavioral: Negative for mood changes. The patient is not nervous/anxious.          OBJECTIVE:   BP (!) 156/90 (BP Location: Left arm, Patient Position: Sitting)   Pulse 72   Temp 98.3  F (36.8  C) (Oral)   Resp 17   Ht 1.803 m (5' 11\")   Wt 117.9 kg (260 lb)   SpO2 95%   BMI 36.26 kg/m   Estimated body mass index is 36.26 kg/m  as calculated from the following:    Height as of this encounter: 1.803 m (5' 11\").    Weight as of this encounter: 117.9 kg (260 lb).  Physical Exam  GENERAL: healthy, alert and no distress  EYES: Eyes grossly normal to inspection, PERRL and conjunctivae and sclerae normal  HENT: " ear canals and TM's normal, nose and mouth without ulcers or lesions  NECK: no adenopathy, no asymmetry, masses, or scars and thyroid normal to palpation  RESP: lungs clear to auscultation - no rales, rhonchi or wheezes  CV: regular rate and rhythm, normal S1 S2, no S3 or S4, no murmur, click or rub, no peripheral edema and peripheral pulses strong  ABDOMEN: soft, nontender, no hepatosplenomegaly, no masses and bowel sounds normal  MS: no gross musculoskeletal defects noted, no edema  SKIN: no suspicious lesions or rashes  NEURO: Normal strength and tone, mentation intact and speech normal  PSYCH: mentation appears normal, affect normal/bright    Diagnostic Test Results:  Labs reviewed in Epic    ASSESSMENT / PLAN:   (Z00.00) Encounter for Medicare annual wellness exam  (primary encounter diagnosis)  Comment: Preventative needs reviews.  Plan: See Epic for orders    (N40.1,  R35.0) Benign prostatic hyperplasia with urinary frequency  Comment: Controlled  Plan: tamsulosin (FLOMAX) 0.4 MG capsule  Refill x 1 yr             (I10) Hypertension goal BP (blood pressure) < 140/90  Comment: Not controlled. Blood pressure in clinic today 162/91. Rechecked 159/90.Discussed increasing physical activity. Discussed risk and benefits of increasing dose of losartan.  Plan: losartan (COZAAR) 100 MG tablet  After being on new dose for 2 weeks, check blood pressure for a couple of days at home and send readings to via [employer] to Dr. Lopez.    (I70.0) Atherosclerosis of abdominal aorta (H)  (E78.5) Hyperlipidemia LDL goal <130  Comment: Controlled  Plan: Refill x 1 yr in Nov 2022    (E66.01) Morbid obesity (H)  Comment: Trending down.   Plan: Encouraged to find and participate in a physical activity of his choice.            COUNSELING:  Reviewed preventive health counseling, as reflected in patient instructions  Special attention given to:       Regular exercise       Healthy diet/nutrition      BMI:   Estimated body mass index is  "36.26 kg/m  as calculated from the following:    Height as of this encounter: 1.803 m (5' 11\").    Weight as of this encounter: 117.9 kg (260 lb).   Weight management plan: Discussed healthy diet and exercise guidelines      He reports that he quit smoking about 39 years ago. His smoking use included cigarettes and pipe. He started smoking about 54 years ago. He has a 10.00 pack-year smoking history. He has never used smokeless tobacco.      Appropriate preventive services were discussed with this patient, including applicable screening as appropriate for cardiovascular disease, diabetes, osteopenia/osteoporosis, and glaucoma.  As appropriate for age/gender, discussed screening for colorectal cancer, prostate cancer, breast cancer, and cervical cancer. Checklist reviewing preventive services available has been given to the patient.    Reviewed patients plan of care and provided an AVS. The Intermediate Care Plan ( asthma action plan, low back pain action plan, and migraine action plan) for Rm meets the Care Plan requirement. This Care Plan has been established and reviewed with the Patient.          Riddhi Lopez MD  [facility]    Identified Health Risks:  Answers for HPI/ROS submitted by the patient on 1/31/2023  If you checked off any problems, how difficult have these problems made it for you to do your work, take care of things at home, or get along with other people?: Not difficult at all  PHQ9 TOTAL SCORE: 2      "

## 2023-03-31 ENCOUNTER — NURSE TRIAGE (OUTPATIENT)
Dept: NURSING | Facility: CLINIC | Age: 78
End: 2023-03-31
Payer: COMMERCIAL

## 2023-04-01 ENCOUNTER — NURSE TRIAGE (OUTPATIENT)
Dept: NURSING | Facility: CLINIC | Age: 78
End: 2023-04-01
Payer: COMMERCIAL

## 2023-04-01 NOTE — TELEPHONE ENCOUNTER
"Addendum     11:40pm         03/31/2023     B/P readings-  131/87 & 106/73 done at 8:57 pm  123/79 & 11/85 done at 11:08 pm    Patient wondering if he should take his Larsantan 50 mg & Metoprolol 100 mg based on the above B/P readings.  Writer advised to take.    Lexi Summers RN, [facility] Triage Nurse Advisor        __________________________________________    Had BP issues awhile ago, med changes: Metoprolol 100 mg 2x/day. Losartan 50 mg 2x/day. Did get new BP monitor. Seen 1/31/23. This am was 106/78, took meds, took again this afternoon 87/62. Later today was 117/73. Currently 140/91. Has not drank as much as usual today . Has been monitoring BP readings more lately. Has lost some weight but not major amount.     Protocol and care advice reviewed. Call back with low BP readings, dizziness/lightheadedness. Transferred to scheduling to make appointment within next 3 days.     DAVID DE LA VEGA RN  [facility] nurse advisors  3/31/2023  8:43 PM        Reason for Disposition    Brief (now gone) dizziness or lightheadedness after standing up or eating    Additional Information    Negative: Started suddenly after an allergic medicine, an allergic food, or bee sting    Negative: Shock suspected (e.g., cold/pale/clammy skin, too weak to stand, low BP, rapid pulse)    Negative: Difficult to awaken or acting confused (e.g., disoriented, slurred speech)    Negative: Fainted    Negative: [1] Systolic BP < 90 AND [2] dizzy, lightheaded, or weak    Negative: Chest pain    Negative: Bleeding (e.g., vomiting blood, rectal bleeding or tarry stools, severe vaginal bleeding)(Exception: fainted from sight of small amount of blood; small cut or abrasion)    Negative: Extra heart beats or heart is beating fast (i.e., \"palpitations\")    Negative: Sounds like a life-threatening emergency to the triager    Negative: [1] Systolic BP < 80 AND [2] NOT dizzy, lightheaded or weak    Negative: Abdominal pain    Negative: Fever " > 100.4 F (38.0 C)    Negative: Major surgery in the past month    Negative: [1] Drinking very little AND [2] dehydration suspected (e.g., no urine > 12 hours, very dry mouth, very lightheaded)    Negative: [1] Fall in systolic BP > 20 mm Hg from normal AND [2] dizzy, lightheaded, or weak    Negative: Patient sounds very sick or weak to the triager    Negative: [1] Systolic BP < 90 AND [2] NOT dizzy, lightheaded or weak    Negative: [1] Systolic BP  AND [2] taking blood pressure medications AND [3] dizzy, lightheaded or weak    Negative: [1] Systolic BP  AND [2] taking blood pressure medications AND [3] NOT dizzy, lightheaded or weak    Negative: [1] Fall in systolic BP > 20 mm Hg from normal AND [2] NOT dizzy, lightheaded, or weak    Negative: Fall in systolic BP > 20 mmHg after standing up    Negative: Fall in systolic BP > 20 mmHg after eating a meal    Negative: Diastolic BP < 50 mm Hg    Protocols used: BLOOD PRESSURE - LOW-[facility]

## 2023-04-02 NOTE — TELEPHONE ENCOUNTER
Hx of HTN: medication increased Metoprolol from 50mg twice daily to 100mg twice daily and Losartan increased from 50 to 100mg @ night. His readings have been a little low recently (in the last couple of days). Today it was 116/85 P64 @ 9:20am, 123/81 P64 @9:28pm. After taking medication it dropped to 79/59 @ 1pm. (medication had been taken @ 9:30am). 1:10pm 94/60 P 75.  Yesterday 122/80 @ 10am, and rechecked 1+1/2 hrs later 106/78. Medication taken @ 11:34am.   Blood pressure checked often, although he states he is not having any sx, he has been curious about his readings.   /63 P99 @ 9:36pm  105/68 P106 @ 9:29pm  107/73 P 76 @ 9:42pm  He has a new blood pressure machine, and wonders how accurate it is. He is not having any sx.   He has not taken his medication for tonight--thinks it might make his blood pressure go too low. He wants to know if he should hold it?  Dr Mary Neal on call: paged via [employer] @ 10:18pm  10:30pm Hold Losartan tonight if systolic is <120 or if he is symptomatic.Continue checking blood pressure. Call tomorrow with readings for further instructions.   10:35pm Called patient back with this information--he verbalized understanding.     Jeanine Zaidi RN Triage Nurse Advisor 10:37 PM 4/1/2023    Reason for Disposition    [1] Systolic BP  AND [2] taking blood pressure medications AND [3] NOT dizzy, lightheaded or weak    Additional Information    Negative: Started suddenly after an allergic medicine, an allergic food, or bee sting    Negative: Shock suspected (e.g., cold/pale/clammy skin, too weak to stand, low BP, rapid pulse)    Negative: Difficult to awaken or acting confused (e.g., disoriented, slurred speech)    Negative: Fainted    Negative: [1] Systolic BP < 90 AND [2] dizzy, lightheaded, or weak    Negative: Chest pain    Negative: Bleeding (e.g., vomiting blood, rectal bleeding or tarry stools, severe vaginal bleeding)(Exception: fainted from sight of small amount of  "blood; small cut or abrasion)    Negative: Extra heart beats or heart is beating fast (i.e., \"palpitations\")    Negative: Sounds like a life-threatening emergency to the triager    Negative: [1] Systolic BP < 80 AND [2] NOT dizzy, lightheaded or weak    Negative: Abdominal pain    Negative: Fever > 100.4 F (38.0 C)    Negative: Major surgery in the past month    Negative: [1] Drinking very little AND [2] dehydration suspected (e.g., no urine > 12 hours, very dry mouth, very lightheaded)    Negative: [1] Fall in systolic BP > 20 mm Hg from normal AND [2] dizzy, lightheaded, or weak    Negative: Patient sounds very sick or weak to the triager    Negative: [1] Systolic BP < 90 AND [2] NOT dizzy, lightheaded or weak    Negative: [1] Systolic BP  AND [2] taking blood pressure medications AND [3] dizzy, lightheaded or weak    Protocols used: BLOOD PRESSURE - LOW-A-      "

## 2023-04-05 ENCOUNTER — VIRTUAL VISIT (OUTPATIENT)
Dept: FAMILY MEDICINE | Facility: CLINIC | Age: 78
End: 2023-04-05
Payer: COMMERCIAL

## 2023-04-05 DIAGNOSIS — I10 HYPERTENSION GOAL BP (BLOOD PRESSURE) < 140/90: Primary | ICD-10-CM

## 2023-04-05 PROCEDURE — 99213 OFFICE O/P EST LOW 20 MIN: CPT | Mod: VID | Performed by: FAMILY MEDICINE

## 2023-04-05 RX ORDER — LOSARTAN POTASSIUM 50 MG/1
1 TABLET ORAL
COMMUNITY
Start: 2023-02-27 | End: 2023-06-02

## 2023-04-05 NOTE — PROGRESS NOTES
Morris is a 77 year old who is being evaluated via a billable video visit.      How would you like to obtain your AVS? MyChart  If the video visit is dropped, the invitation should be resent by: Send to e-mail at: [email or web address]  Will anyone else be joining your video visit? No          Assessment & Plan     (I10) Hypertension goal BP (blood pressure) < 140/90  (primary encounter diagnosis)  Comment: dropping low  Plan: reduce losartan to 50 mg daily, follow-up with home readings in 2 weeks.  Consider 75mg of losartan if running high.                Patient Instructions   Reduce losartan to 50 mg once a day.    Continue all other medications as you are currently taking them.       Riddhi Lopez MD  [facility] [place]    Subjective   Morris is a 77 year old, presenting for the following health issues:  Recheck Medication and Hypertension        4/5/2023     9:34 AM   Additional Questions   Roomed by Juanita     History of Present Illness       Hypertension: He presents for follow up of hypertension.  He does check blood pressure  regularly outside of the clinic. Outpatient blood pressures have not been over 140/90. He follows a low salt diet. He consumes 0 sweetened beverage(s) daily.He exercises with enough effort to increase his heart rate 9 or less minutes per day.  He exercises with enough effort to increase his heart rate 3 or less days per week.   He is taking medications regularly.     Low readings on home validated cuff, usually no symptoms.    Has lost 10# since January. Not very active yet and anticipating that will improve.      Currently taking losartan 100 mg daily, metoprolol tartrate 100 mg twice daily and tamsulosin 0.4 mg daily  Has 50 mg losartan tabs at home and had been taking 1 twice a day.   Did not fill 100 mg tablets    Has not had any hypotensive symptoms during any of his low readings.            Review of Systems   Constitutional, HEENT, cardiovascular, pulmonary, gi and gu  systems are negative, except as otherwise noted.      Objective           Vitals:  No vitals were obtained today due to virtual visit.    Physical Exam   GENERAL: Healthy, alert and no distress  EYES: Eyes grossly normal to inspection.  No discharge or erythema, or obvious scleral/conjunctival abnormalities.  RESP: No audible wheeze, cough, or visible cyanosis.  No visible retractions or increased work of breathing.    SKIN: Visible skin clear. No significant rash, abnormal pigmentation or lesions.  NEURO: Cranial nerves grossly intact.  Mentation and speech appropriate for age.  PSYCH: Mentation appears normal, affect normal/bright, judgement and insight intact, normal speech and appearance well-groomed.                Video-Visit Details    Type of service:  Video Visit   Video Start Time: 10:34 AM  Video End Time:11:00 AM    Originating Location (pt. Location): Home    Distant Location (provider location):  Off-site  Platform used for Video Visit: [employer]

## 2023-04-05 NOTE — PATIENT INSTRUCTIONS
Reduce losartan to 50 mg once a day.    Continue all other medications as you are currently taking them.

## 2023-04-23 ENCOUNTER — HEALTH MAINTENANCE LETTER (OUTPATIENT)
Age: 78
End: 2023-04-23

## 2023-05-31 ENCOUNTER — MYC MEDICAL ADVICE (OUTPATIENT)
Dept: FAMILY MEDICINE | Facility: CLINIC | Age: 78
End: 2023-05-31
Payer: COMMERCIAL

## 2023-05-31 DIAGNOSIS — I10 HYPERTENSION GOAL BP (BLOOD PRESSURE) < 150/90: Primary | ICD-10-CM

## 2023-06-02 RX ORDER — LOSARTAN POTASSIUM 25 MG/1
25 TABLET ORAL 2 TIMES DAILY
Qty: 180 TABLET | Refills: 1 | Status: SHIPPED | OUTPATIENT
Start: 2023-06-02 | End: 2024-04-29

## 2023-06-02 RX ORDER — METOPROLOL TARTRATE 50 MG
50 TABLET ORAL 2 TIMES DAILY
Qty: 180 TABLET | Refills: 1 | Status: SHIPPED | OUTPATIENT
Start: 2023-06-02 | End: 2023-12-27

## 2023-08-17 ENCOUNTER — MYC MEDICAL ADVICE (OUTPATIENT)
Dept: FAMILY MEDICINE | Facility: CLINIC | Age: 78
End: 2023-08-17
Payer: COMMERCIAL

## 2023-08-17 DIAGNOSIS — Z12.5 SCREENING PSA (PROSTATE SPECIFIC ANTIGEN): Primary | ICD-10-CM

## 2023-08-17 DIAGNOSIS — E78.5 HYPERLIPIDEMIA LDL GOAL <130: ICD-10-CM

## 2023-08-17 DIAGNOSIS — I10 HYPERTENSION GOAL BP (BLOOD PRESSURE) < 140/90: ICD-10-CM

## 2023-08-17 NOTE — TELEPHONE ENCOUNTER
Provider:  Please see MyChart message from the patient. I am unclear when you wanted to see him back after changing his medication 5/31/2023.   He has another question about taking on his wife as a patient.   Thank you. Elvira Cano R.N.

## 2023-09-14 ENCOUNTER — TELEPHONE (OUTPATIENT)
Dept: FAMILY MEDICINE | Facility: CLINIC | Age: 78
End: 2023-09-14
Payer: COMMERCIAL

## 2023-09-14 NOTE — TELEPHONE ENCOUNTER
Patient Quality Outreach    Patient is due for the following:   Hypertension -  BP check      Topic Date Due    Zoster (Shingles) Vaccine (1 of 2) Never done    Pneumococcal Vaccine (1 - PCV) Never done    COVID-19 Vaccine (5 - Pfizer series) 07/09/2022    Flu Vaccine (1) 09/01/2023       Next Steps:   Schedule a nurse only visit for Blood Pressure check and Immunizations    Type of outreach:    Sent [employer] message.      Questions for provider review:    None           Lily Gomez MA

## 2023-09-20 ENCOUNTER — LAB (OUTPATIENT)
Dept: LAB | Facility: CLINIC | Age: 78
End: 2023-09-20
Payer: COMMERCIAL

## 2023-09-20 DIAGNOSIS — Z12.5 SCREENING PSA (PROSTATE SPECIFIC ANTIGEN): ICD-10-CM

## 2023-09-20 DIAGNOSIS — I10 HYPERTENSION GOAL BP (BLOOD PRESSURE) < 140/90: ICD-10-CM

## 2023-09-20 DIAGNOSIS — E78.5 HYPERLIPIDEMIA LDL GOAL <130: ICD-10-CM

## 2023-09-20 LAB
ANION GAP SERPL CALCULATED.3IONS-SCNC: 12 MMOL/L (ref 7–15)
BUN SERPL-MCNC: 20.8 MG/DL (ref 8–23)
CALCIUM SERPL-MCNC: 9.2 MG/DL (ref 8.8–10.2)
CHLORIDE SERPL-SCNC: 103 MMOL/L (ref 98–107)
CHOLEST SERPL-MCNC: 162 MG/DL
CREAT SERPL-MCNC: 0.88 MG/DL (ref 0.67–1.17)
DEPRECATED HCO3 PLAS-SCNC: 27 MMOL/L (ref 22–29)
EGFRCR SERPLBLD CKD-EPI 2021: 89 ML/MIN/1.73M2
GLUCOSE SERPL-MCNC: 92 MG/DL (ref 70–99)
HDLC SERPL-MCNC: 66 MG/DL
LDLC SERPL CALC-MCNC: 81 MG/DL
NONHDLC SERPL-MCNC: 96 MG/DL
POTASSIUM SERPL-SCNC: 4.1 MMOL/L (ref 3.4–5.3)
PSA SERPL DL<=0.01 NG/ML-MCNC: 1.81 NG/ML (ref 0–6.5)
SODIUM SERPL-SCNC: 142 MMOL/L (ref 136–145)
TRIGL SERPL-MCNC: 77 MG/DL

## 2023-09-20 PROCEDURE — 80048 BASIC METABOLIC PNL TOTAL CA: CPT

## 2023-09-20 PROCEDURE — 80061 LIPID PANEL: CPT

## 2023-09-20 PROCEDURE — G0103 PSA SCREENING: HCPCS

## 2023-09-20 PROCEDURE — 36415 COLL VENOUS BLD VENIPUNCTURE: CPT

## 2023-11-28 ENCOUNTER — TELEPHONE (OUTPATIENT)
Dept: FAMILY MEDICINE | Facility: CLINIC | Age: 78
End: 2023-11-28

## 2023-11-28 ENCOUNTER — MYC MEDICAL ADVICE (OUTPATIENT)
Dept: FAMILY MEDICINE | Facility: CLINIC | Age: 78
End: 2023-11-28

## 2023-11-28 ENCOUNTER — OFFICE VISIT (OUTPATIENT)
Dept: FAMILY MEDICINE | Facility: CLINIC | Age: 78
End: 2023-11-28
Payer: COMMERCIAL

## 2023-11-28 VITALS
SYSTOLIC BLOOD PRESSURE: 158 MMHG | DIASTOLIC BLOOD PRESSURE: 84 MMHG | TEMPERATURE: 98.7 F | WEIGHT: 260 LBS | OXYGEN SATURATION: 97 % | RESPIRATION RATE: 20 BRPM | HEIGHT: 71 IN | HEART RATE: 81 BPM | BODY MASS INDEX: 36.4 KG/M2

## 2023-11-28 DIAGNOSIS — F41.9 ANXIETY: Primary | ICD-10-CM

## 2023-11-28 DIAGNOSIS — F41.9 ANXIETY: ICD-10-CM

## 2023-11-28 PROCEDURE — 99214 OFFICE O/P EST MOD 30 MIN: CPT | Performed by: FAMILY MEDICINE

## 2023-11-28 RX ORDER — ESCITALOPRAM OXALATE 5 MG/1
TABLET ORAL
Qty: 74 TABLET | Refills: 0 | Status: SHIPPED | OUTPATIENT
Start: 2023-11-28 | End: 2023-12-19

## 2023-11-28 RX ORDER — HYDROXYZINE HYDROCHLORIDE 10 MG/1
10-50 TABLET, FILM COATED ORAL
Qty: 60 TABLET | Refills: 1 | Status: SHIPPED | OUTPATIENT
Start: 2023-11-28 | End: 2023-12-19

## 2023-11-28 ASSESSMENT — ANXIETY QUESTIONNAIRES
1. FEELING NERVOUS, ANXIOUS, OR ON EDGE: SEVERAL DAYS
3. WORRYING TOO MUCH ABOUT DIFFERENT THINGS: SEVERAL DAYS
2. NOT BEING ABLE TO STOP OR CONTROL WORRYING: SEVERAL DAYS
7. FEELING AFRAID AS IF SOMETHING AWFUL MIGHT HAPPEN: MORE THAN HALF THE DAYS
GAD7 TOTAL SCORE: 6
6. BECOMING EASILY ANNOYED OR IRRITABLE: NOT AT ALL
IF YOU CHECKED OFF ANY PROBLEMS ON THIS QUESTIONNAIRE, HOW DIFFICULT HAVE THESE PROBLEMS MADE IT FOR YOU TO DO YOUR WORK, TAKE CARE OF THINGS AT HOME, OR GET ALONG WITH OTHER PEOPLE: SOMEWHAT DIFFICULT
GAD7 TOTAL SCORE: 6
5. BEING SO RESTLESS THAT IT IS HARD TO SIT STILL: SEVERAL DAYS

## 2023-11-28 ASSESSMENT — PATIENT HEALTH QUESTIONNAIRE - PHQ9
SUM OF ALL RESPONSES TO PHQ QUESTIONS 1-9: 7
5. POOR APPETITE OR OVEREATING: NOT AT ALL

## 2023-11-28 ASSESSMENT — ENCOUNTER SYMPTOMS: NERVOUS/ANXIOUS: 1

## 2023-11-28 ASSESSMENT — PAIN SCALES - GENERAL: PAINLEVEL: NO PAIN (0)

## 2023-11-28 NOTE — TELEPHONE ENCOUNTER
"Pt seen for visit with Dr. Lopez today and prescribed lexapro and hydroxyzine.    Pt states pharmacy got prescription for lexapro but not prescription for hydroxyzine.    Reviewed chart and prescription indicates \"waiting for payer response.\"  Writer advised this means prescription was sent to pharmacy, but they are waiting on response from pt's insurance to determine if a PA is needed. Advised pt can follow up with pharmacy and/or insurance company if he hasn't heard back on this within a few days. Pt verbalized understanding.     Pt asking Dr. Lopez if he can take an otc medicine like benadryl as an alternative for sleep problems until he can get prescription for hydroxyzine.   Writer advised benadryl is not typically advised in older adults due to increased fall risk, but will defer to provider for specific recommendations.     Iwona Little RN    [facility]- Primary Care    "

## 2023-11-28 NOTE — TELEPHONE ENCOUNTER
Pt notified via phone of provider 's 11/28/2023  3:17 PM message below as written. Pt indicates understanding of issues and agrees with the plan.    ITZEL UlloaN, RN

## 2023-11-28 NOTE — PATIENT INSTRUCTIONS
Hydroxyzine for sleep. Start with 1 tablet 30 minutes prior to sleep time, ok in to increase dose each night to max of 50 mg.  Give each dose about 3 nights to work.    Lexapro for anxiety, taken daily for best effect.  Watch for MyChart message from Riddhi Lopez MD in about 3 weeks. Ok to increase dose to 2 tablets if not noting any change after 2 weeks.

## 2023-11-28 NOTE — PROGRESS NOTES
"  Assessment & Plan     (F41.9) Anxiety  (primary encounter diagnosis)  Comment: increasing  Plan: escitalopram (LEXAPRO) 5 MG tablet, hydrOXYzine        (ATARAX) 10 MG tablet        Start lexapro taper to 10 mg   Hydroxyzine for sleep as needed. Discussed potential side effects and actions to take if they occur.   Consider adjusting losartan if bp does not improve on home monitoring cuff             BMI:   Estimated body mass index is 36.26 kg/m  as calculated from the following:    Height as of this encounter: 1.803 m (5' 11\").    Weight as of this encounter: 117.9 kg (260 lb).   Weight management plan: Discussed healthy diet and exercise guidelines    See Patient Instructions    Riddhi Lopez MD  [facility]    Johnny Caceres is a 78 year old, presenting for the following health issues:  Hypertension and Anxiety        11/28/2023    11:45 AM   Additional Questions   Roomed by Lily   Accompanied by n/a       History of Present Illness       Reason for visit:  Anxiety    He eats 0-1 servings of fruits and vegetables daily.He consumes 0 sweetened beverage(s) daily.He exercises with enough effort to increase his heart rate 9 or less minutes per day.  He exercises with enough effort to increase his heart rate 3 or less days per week.   He is taking medications regularly.         1/31/2023     9:25 AM 11/28/2023    11:48 AM   PHQ   PHQ-9 Total Score 2 7   Q9: Thoughts of better off dead/self-harm past 2 weeks Not at all Not at all          11/28/2023    11:48 AM   RAJAT-7 SCORE   Total Score 6     Increasing anxiety and irritability.  Is worried it is making his bp increase.    Having some stressors with family and worries.  Very poor sleep lately              Review of Systems   Psychiatric/Behavioral:  The patient is nervous/anxious.    All other systems reviewed and are negative.           Objective    BP (!) 184/94   Pulse 81   Temp 98.7  F (37.1  C) (Tympanic)   Resp 20   Ht 1.803 m " "(5' 11\")   Wt 117.9 kg (260 lb)   SpO2 97%   BMI 36.26 kg/m    Body mass index is 36.26 kg/m .  Physical Exam   GENERAL: healthy, alert and no distress  RESP: lungs clear to auscultation - no rales, rhonchi or wheezes  CV: regular rate and rhythm, normal S1 S2, no S3 or S4, no murmur, click or rub, no peripheral edema and peripheral pulses strong  MS: no gross musculoskeletal defects noted, no edema  SKIN: no suspicious lesions or rashes  PSYCH: mentation appears normal, affect normal/bright, and anxious                      "

## 2023-12-19 RX ORDER — ESCITALOPRAM OXALATE 10 MG/1
10 TABLET ORAL DAILY
Qty: 90 TABLET | Refills: 0 | Status: SHIPPED | OUTPATIENT
Start: 2023-12-19 | End: 2024-04-29

## 2023-12-26 DIAGNOSIS — I10 HYPERTENSION GOAL BP (BLOOD PRESSURE) < 150/90: ICD-10-CM

## 2023-12-26 DIAGNOSIS — I70.0 ATHEROSCLEROSIS OF ABDOMINAL AORTA (H): ICD-10-CM

## 2023-12-26 DIAGNOSIS — I65.23 CAROTID ATHEROSCLEROSIS, BILATERAL: ICD-10-CM

## 2023-12-27 RX ORDER — METOPROLOL TARTRATE 50 MG
50 TABLET ORAL 2 TIMES DAILY
Qty: 180 TABLET | Refills: 1 | Status: SHIPPED | OUTPATIENT
Start: 2023-12-27 | End: 2024-06-05

## 2023-12-27 RX ORDER — ATORVASTATIN CALCIUM 40 MG/1
40 TABLET, FILM COATED ORAL AT BEDTIME
Qty: 90 TABLET | Refills: 1 | Status: SHIPPED | OUTPATIENT
Start: 2023-12-27 | End: 2024-06-05

## 2024-01-04 ENCOUNTER — PATIENT OUTREACH (OUTPATIENT)
Dept: CARE COORDINATION | Facility: CLINIC | Age: 79
End: 2024-01-04
Payer: COMMERCIAL

## 2024-01-18 ENCOUNTER — PATIENT OUTREACH (OUTPATIENT)
Dept: CARE COORDINATION | Facility: CLINIC | Age: 79
End: 2024-01-18
Payer: COMMERCIAL

## 2024-03-14 DIAGNOSIS — N40.1 BENIGN PROSTATIC HYPERPLASIA WITH URINARY FREQUENCY: ICD-10-CM

## 2024-03-14 DIAGNOSIS — R35.0 BENIGN PROSTATIC HYPERPLASIA WITH URINARY FREQUENCY: ICD-10-CM

## 2024-03-15 RX ORDER — TAMSULOSIN HYDROCHLORIDE 0.4 MG/1
0.4 CAPSULE ORAL DAILY
Qty: 90 CAPSULE | Refills: 0 | Status: SHIPPED | OUTPATIENT
Start: 2024-03-15 | End: 2024-06-05

## 2024-04-21 ENCOUNTER — HEALTH MAINTENANCE LETTER (OUTPATIENT)
Age: 79
End: 2024-04-21

## 2024-04-29 DIAGNOSIS — F41.9 ANXIETY: ICD-10-CM

## 2024-04-29 DIAGNOSIS — I10 HYPERTENSION GOAL BP (BLOOD PRESSURE) < 150/90: ICD-10-CM

## 2024-04-29 DIAGNOSIS — E78.5 HYPERLIPIDEMIA LDL GOAL <130: Primary | ICD-10-CM

## 2024-04-29 RX ORDER — LOSARTAN POTASSIUM 25 MG/1
25 TABLET ORAL 2 TIMES DAILY
Qty: 180 TABLET | Refills: 0 | Status: SHIPPED | OUTPATIENT
Start: 2024-04-29 | End: 2024-06-05

## 2024-04-29 RX ORDER — ESCITALOPRAM OXALATE 10 MG/1
10 TABLET ORAL DAILY
Qty: 90 TABLET | Refills: 0 | Status: SHIPPED | OUTPATIENT
Start: 2024-04-29 | End: 2024-06-05

## 2024-05-29 ENCOUNTER — LAB (OUTPATIENT)
Dept: LAB | Facility: CLINIC | Age: 79
End: 2024-05-29
Payer: COMMERCIAL

## 2024-05-29 DIAGNOSIS — E78.5 HYPERLIPIDEMIA LDL GOAL <130: ICD-10-CM

## 2024-05-29 DIAGNOSIS — I10 HYPERTENSION GOAL BP (BLOOD PRESSURE) < 150/90: ICD-10-CM

## 2024-05-29 LAB
ANION GAP SERPL CALCULATED.3IONS-SCNC: 9 MMOL/L (ref 7–15)
BUN SERPL-MCNC: 15.4 MG/DL (ref 8–23)
CALCIUM SERPL-MCNC: 8.9 MG/DL (ref 8.8–10.2)
CHLORIDE SERPL-SCNC: 102 MMOL/L (ref 98–107)
CHOLEST SERPL-MCNC: 156 MG/DL
CREAT SERPL-MCNC: 0.84 MG/DL (ref 0.67–1.17)
DEPRECATED HCO3 PLAS-SCNC: 29 MMOL/L (ref 22–29)
EGFRCR SERPLBLD CKD-EPI 2021: 90 ML/MIN/1.73M2
FASTING STATUS PATIENT QL REPORTED: YES
FASTING STATUS PATIENT QL REPORTED: YES
GLUCOSE SERPL-MCNC: 87 MG/DL (ref 70–99)
HDLC SERPL-MCNC: 59 MG/DL
LDLC SERPL CALC-MCNC: 84 MG/DL
NONHDLC SERPL-MCNC: 97 MG/DL
POTASSIUM SERPL-SCNC: 4.3 MMOL/L (ref 3.4–5.3)
SODIUM SERPL-SCNC: 140 MMOL/L (ref 135–145)
TRIGL SERPL-MCNC: 66 MG/DL

## 2024-05-29 PROCEDURE — 80061 LIPID PANEL: CPT

## 2024-05-29 PROCEDURE — 80048 BASIC METABOLIC PNL TOTAL CA: CPT

## 2024-05-29 PROCEDURE — 36415 COLL VENOUS BLD VENIPUNCTURE: CPT

## 2024-06-05 ENCOUNTER — OFFICE VISIT (OUTPATIENT)
Dept: FAMILY MEDICINE | Facility: CLINIC | Age: 79
End: 2024-06-05
Payer: COMMERCIAL

## 2024-06-05 VITALS
SYSTOLIC BLOOD PRESSURE: 120 MMHG | BODY MASS INDEX: 35.34 KG/M2 | HEIGHT: 71 IN | TEMPERATURE: 98.5 F | HEART RATE: 95 BPM | DIASTOLIC BLOOD PRESSURE: 76 MMHG | OXYGEN SATURATION: 92 % | RESPIRATION RATE: 20 BRPM | WEIGHT: 252.4 LBS

## 2024-06-05 DIAGNOSIS — E66.01 CLASS 2 SEVERE OBESITY DUE TO EXCESS CALORIES WITH SERIOUS COMORBIDITY AND BODY MASS INDEX (BMI) OF 35.0 TO 35.9 IN ADULT (H): ICD-10-CM

## 2024-06-05 DIAGNOSIS — I70.0 ATHEROSCLEROSIS OF ABDOMINAL AORTA (H): ICD-10-CM

## 2024-06-05 DIAGNOSIS — I65.23 CAROTID ATHEROSCLEROSIS, BILATERAL: ICD-10-CM

## 2024-06-05 DIAGNOSIS — H69.93 DYSFUNCTION OF BOTH EUSTACHIAN TUBES: ICD-10-CM

## 2024-06-05 DIAGNOSIS — I10 HYPERTENSION GOAL BP (BLOOD PRESSURE) < 150/90: ICD-10-CM

## 2024-06-05 DIAGNOSIS — R35.0 BENIGN PROSTATIC HYPERPLASIA WITH URINARY FREQUENCY: ICD-10-CM

## 2024-06-05 DIAGNOSIS — Z00.00 ENCOUNTER FOR MEDICARE ANNUAL WELLNESS EXAM: Primary | ICD-10-CM

## 2024-06-05 DIAGNOSIS — N40.1 BENIGN PROSTATIC HYPERPLASIA WITH URINARY FREQUENCY: ICD-10-CM

## 2024-06-05 DIAGNOSIS — F41.9 ANXIETY: ICD-10-CM

## 2024-06-05 DIAGNOSIS — H93.13 TINNITUS, BILATERAL: ICD-10-CM

## 2024-06-05 DIAGNOSIS — E66.812 CLASS 2 SEVERE OBESITY DUE TO EXCESS CALORIES WITH SERIOUS COMORBIDITY AND BODY MASS INDEX (BMI) OF 35.0 TO 35.9 IN ADULT (H): ICD-10-CM

## 2024-06-05 PROCEDURE — 99214 OFFICE O/P EST MOD 30 MIN: CPT | Mod: 25 | Performed by: FAMILY MEDICINE

## 2024-06-05 PROCEDURE — G0439 PPPS, SUBSEQ VISIT: HCPCS | Performed by: FAMILY MEDICINE

## 2024-06-05 RX ORDER — METOPROLOL TARTRATE 50 MG
50 TABLET ORAL 2 TIMES DAILY
Qty: 180 TABLET | Refills: 1 | Status: SHIPPED | OUTPATIENT
Start: 2024-06-05

## 2024-06-05 RX ORDER — HYDROXYZINE HYDROCHLORIDE 10 MG/1
10 TABLET, FILM COATED ORAL
Qty: 90 TABLET | Refills: 1 | Status: SHIPPED | OUTPATIENT
Start: 2024-06-05

## 2024-06-05 RX ORDER — TAMSULOSIN HYDROCHLORIDE 0.4 MG/1
0.4 CAPSULE ORAL DAILY
Qty: 90 CAPSULE | Refills: 1 | Status: SHIPPED | OUTPATIENT
Start: 2024-06-05

## 2024-06-05 RX ORDER — MOMETASONE FUROATE MONOHYDRATE 50 UG/1
2 SPRAY, METERED NASAL DAILY
Qty: 17 G | Refills: 3 | Status: SHIPPED | OUTPATIENT
Start: 2024-06-05

## 2024-06-05 RX ORDER — ATORVASTATIN CALCIUM 40 MG/1
40 TABLET, FILM COATED ORAL AT BEDTIME
Qty: 90 TABLET | Refills: 3 | Status: SHIPPED | OUTPATIENT
Start: 2024-06-05

## 2024-06-05 RX ORDER — RESPIRATORY SYNCYTIAL VIRUS VACCINE 120MCG/0.5
0.5 KIT INTRAMUSCULAR ONCE
Qty: 1 EACH | Refills: 0 | Status: CANCELLED | OUTPATIENT
Start: 2024-06-05 | End: 2024-06-05

## 2024-06-05 RX ORDER — LOSARTAN POTASSIUM 25 MG/1
25 TABLET ORAL 2 TIMES DAILY
Qty: 180 TABLET | Refills: 1 | Status: SHIPPED | OUTPATIENT
Start: 2024-06-05

## 2024-06-05 RX ORDER — ESCITALOPRAM OXALATE 10 MG/1
10 TABLET ORAL DAILY
Qty: 90 TABLET | Refills: 1 | Status: SHIPPED | OUTPATIENT
Start: 2024-06-05

## 2024-06-05 SDOH — HEALTH STABILITY: PHYSICAL HEALTH: ON AVERAGE, HOW MANY DAYS PER WEEK DO YOU ENGAGE IN MODERATE TO STRENUOUS EXERCISE (LIKE A BRISK WALK)?: 0 DAYS

## 2024-06-05 ASSESSMENT — PAIN SCALES - GENERAL: PAINLEVEL: NO PAIN (0)

## 2024-06-05 ASSESSMENT — SOCIAL DETERMINANTS OF HEALTH (SDOH): HOW OFTEN DO YOU GET TOGETHER WITH FRIENDS OR RELATIVES?: NEVER

## 2024-06-05 NOTE — PROGRESS NOTES
"Preventive Care Visit  [facility]  Riddhi Lopez MD, Family Medicine  Jun 5, 2024      Assessment & Plan     (Z00.00) Encounter for Medicare annual wellness exam  (primary encounter diagnosis)  Comment: preventive needs reviewed   Plan: see orders in Epic.     (H69.93) Dysfunction of both eustachian tubes  (H93.13) Tinnitus, bilateral  Comment: worsening  Plan: mometasone (NASONEX) 50 MCG/ACT nasal spray,         Adult Audiology  Referral, Adult ENT          Referral        Refer to audiology and ENT  Start nasonex for possible ETD    (F41.9) Anxiety  Comment: improved on lexapro  Plan: escitalopram (LEXAPRO) 10 MG tablet,         hydrOXYzine HCl (ATARAX) 10 MG tablet        Continue lexapro,  Refill x 6 months  As needed use of hydroxyzine for sleep    (I70.0) Atherosclerosis of abdominal aorta (H24)  (I65.23) Carotid atherosclerosis, bilateral  Comment: asymptomatic  Plan: atorvastatin (LIPITOR) 40 MG tablet        Refill x 1 yr     (I10) Hypertension goal BP (blood pressure) < 150/90  Comment: to goal  Plan: losartan (COZAAR) 25 MG tablet, metoprolol         tartrate (LOPRESSOR) 50 MG tablet         Refill x 6 months     (N40.1,  R35.0) Benign prostatic hyperplasia with urinary frequency  Comment: stable  Plan: tamsulosin (FLOMAX) 0.4 MG capsule        Refill x 1 yr     (E66.01,  Z68.35) Class 2 severe obesity due to excess calories with serious comorbidity and body mass index (BMI) of 35.0 to 35.9 in adult (H)  Comment: struggling to get below 240  Plan: not exercising, may start with walking daily  Discussed nutrition plan            BMI  Estimated body mass index is 35.7 kg/m  as calculated from the following:    Height as of this encounter: 1.791 m (5' 10.5\").    Weight as of this encounter: 114.5 kg (252 lb 6.4 oz).   Weight management plan: Discussed healthy diet and exercise guidelines    Counseling  Appropriate preventive services were discussed with this " patient, including applicable screening as appropriate for fall prevention, nutrition, physical activity, Tobacco-use cessation, weight loss and cognition.  Checklist reviewing preventive services available has been given to the patient.  Reviewed patient's diet, addressing concerns and/or questions.   Patient is at risk for social isolation and has been provided with information about the benefit of social connection.   He is at risk for psychosocial distress and has been provided with information to reduce risk.   The patient was provided with written information regarding signs of hearing loss.   Information on urinary incontinence and treatment options given to patient.       See Patient Instructions    Johnny Caceres is a 78 year old, presenting for the following:  Wellness Visit        6/5/2024     2:11 PM   Additional Questions   Roomed by Felipe         Health Care Directive  Patient does not have a Health Care Directive or Living Will: Patient states has Advance Directive and will bring in a copy to clinic.    HPI  Morris comes in today with concerns about his tinnitus in both ears.  He has had this for years though over the past 6 months it seems to have been increasing in intensity.  There is some element of waxing and waning however it is constant at some level.  He has never been evaluated for it other than when he was leaving the .    He is continued on the Lexapro which has helped with anxiety he ran out of hydroxyzine and there were no refills though it was helpful for him to sleep when he had difficult nights.  He would like a refill of both medicines    He is frustrated by his lack of weight loss he is able to get to about 10 pounds down and then hits a wall.  He does report that he does better when he does not have to follow his wife's eating schedule and is able to eat when he is hungry and eat what he wants to eat.  Admits that he does not do much physical activity and is willing to make  that change.            6/5/2024   General Health   How would you rate your overall physical health? Good   Feel stress (tense, anxious, or unable to sleep) Only a little   (!) STRESS CONCERN      6/5/2024   Nutrition   Diet: Regular (no restrictions)    Low salt    Other   If other, please elaborate: reduced sugars read labels reduced portions         6/5/2024   Exercise   Days per week of moderate/strenous exercise 0 days   (!) EXERCISE CONCERN      6/5/2024   Social Factors   Frequency of gathering with friends or relatives Never   Worry food won't last until get money to buy more No   Food not last or not have enough money for food? No   Do you have housing?  Yes   Are you worried about losing your housing? No   Lack of transportation? No   Unable to get utilities (heat,electricity)? No   (!) SOCIAL CONNECTIONS CONCERN      6/5/2024   Fall Risk   Fallen 2 or more times in the past year? No    No   Trouble with walking or balance? No    No          6/5/2024   Activities of Daily Living- Home Safety   Needs help with the following daily activites None of the above   Safety concerns in the home None of the above         6/5/2024   Dental   Dentist two times every year? Yes         6/5/2024   Hearing Screening   Hearing concerns? (!) IT'S HARD TO FOLLOW A CONVERSATION IN A NOISY RESTAURANT OR CROWDED ROOM.    (!) TROUBLE UNDESTANDING A SPEAKER IN A PUBLIC MEETING OR [employer] SERVICE.    (!) TROUBLE UNDERSTANDING SOFT OR WHISPERED SPEECH.         6/5/2024   Driving Risk Screening   Patient/family members have concerns about driving No         6/5/2024   General Alertness/Fatigue Screening   Have you been more tired than usual lately? No         6/5/2024   Urinary Incontinence Screening   Bothered by leaking urine in past 6 months Yes         6/5/2024   TB Screening   Were you born outside of the [place]? No         Today's PHQ-2 Score:       6/5/2024     2:08 PM   PHQ-2 ( 1999 [employer])   Q1: Little interest or pleasure in  doing things 0   Q2: Feeling down, depressed or hopeless 0   PHQ-2 Score 0   Q1: Little interest or pleasure in doing things Not at all   Q2: Feeling down, depressed or hopeless Not at all   PHQ-2 Score 0           2024   Substance Use   Alcohol more than 3/day or more than 7/wk No   Do you have a current opioid prescription? No   How severe/bad is pain from 1 to 10? 0/10 (No Pain)   Do you use any other substances recreationally? No     Social History     Tobacco Use    Smoking status: Former     Current packs/day: 0.00     Average packs/day: 1 pack/day for 15.3 years (15.3 ttl pk-yrs)     Types: Cigarettes, Pipe     Start date: 5/15/1968     Quit date: 1983     Years since quittin.8    Smokeless tobacco: Never   Vaping Use    Vaping status: Never Used   Substance Use Topics    Alcohol use: Yes     Comment: Minimal to light    Drug use: No         Family history of prostate cancer: No  Last PSA:   PSA   Date Value Ref Range Status   10/28/2020 2.44 0 - 4 ug/L Final     Comment:     Assay Method:  Chemiluminescence using Siemens Vista analyzer     Prostate Specific Antigen Screen   Date Value Ref Range Status   2023 1.81 0.00 - 6.50 ng/mL Final     Doing self testicular exam: YES     Family history of colon cancer:No  Last colonscopy: 2019 q5y scope     Family history of CAD:Yes mother  Last Cholesterol:   Lab Results   Component Value Date    CHOL 156 2024    CHOL 165 2021     Lab Results   Component Value Date    HDL 59 2024    HDL 63 2021     Lab Results   Component Value Date    LDL 84 2024    LDL 80 2021     Lab Results   Component Value Date    TRIG 66 2024    TRIG 112 2021     Lab Results   Component Value Date    CHOLHDLRATIO 3.6 10/26/2015          Immunizations:    Td: tdap 2021  Covid: 2024  Flu: declined  Shingrix: declines  Pneumovax: declines  RSV: discussed    Seat Belt:YES   Sunscreen use: YES  Calcium Intake: ade  Health Care  Directive:YES   Sexually Active:YES      Current contraception: none       Patient Ed:  Reviewed health maintenance including diet, regular exercise, and periodic exams.     The risks, benefits, and treatment options of prescribed medications or other treatments have been discussed with the patient.  The patient should call or schedule a follow up appt if no improvement or other problems.     ASCVD Risk   The 10-year ASCVD risk score (Jazmyn NICHOLS, et al., 2019) is: 26.1%    Values used to calculate the score:      Age: 78 years      Sex: Male      Is Non- : No      Diabetic: No      Tobacco smoker: No      Systolic Blood Pressure: 120 mmHg      Is BP treated: Yes      HDL Cholesterol: 59 mg/dL      Total Cholesterol: 156 mg/dL            Reviewed and updated as needed this visit by Provider                    Labs reviewed in EPIC  BP Readings from Last 3 Encounters:   06/05/24 120/76   11/28/23 (!) 158/84   01/31/23 (!) 156/90    Wt Readings from Last 3 Encounters:   06/05/24 114.5 kg (252 lb 6.4 oz)   11/28/23 117.9 kg (260 lb)   01/31/23 117.9 kg (260 lb)                  Patient Active Problem List   Diagnosis    Hypertension goal BP (blood pressure) < 140/90    Eczema    Advanced directives, counseling/discussion    Vitamin D deficiency    Colon polyp    Paralyzed hemidiaphragm    Hyperlipidemia LDL goal <130    Venous stasis dermatitis of left lower extremity    Bilateral leg edema    Obesity (BMI 35.0-39.9) with comorbidity (H)    Vertical heterophoria    Carotid atherosclerosis, bilateral    Atherosclerosis of abdominal aorta (H24)    Combined forms of age-related cataract, right eye     Past Surgical History:   Procedure Laterality Date    ABDOMEN SURGERY      BIOPSY      COLONOSCOPY      COLONOSCOPY WITH CO2 INSUFFLATION N/A 8/18/2016    Procedure: COLONOSCOPY WITH CO2 INSUFFLATION;  Surgeon: Ariel Manzanares MD;  Location: [facility] OR    COLONOSCOPY WITH CO2 INSUFFLATION  N/A 2019    Procedure: COLONOSCOPY, WITH CO2 INSUFFLATION;  Surgeon: Ariel Manzanares MD;  Location: [facility] OR    EYE SURGERY      left cataract     HERNIA REPAIR      HERNIA REPAIR      TONSILLECTOMY         Social History     Tobacco Use    Smoking status: Former     Current packs/day: 0.00     Average packs/day: 1 pack/day for 15.3 years (15.3 ttl pk-yrs)     Types: Cigarettes, Pipe     Start date: 5/15/1968     Quit date: 1983     Years since quittin.8    Smokeless tobacco: Never   Substance Use Topics    Alcohol use: Yes     Comment: Minimal to light     Family History   Problem Relation Age of Onset    C.A.D. Mother     Heart Disease Mother     Diabetes Mother     Obesity Mother     Alcohol/Drug Father     Obesity Daughter          Current Outpatient Medications   Medication Sig Dispense Refill    Ascorbic Acid (VITAMIN C PO) Take 2,000 mg by mouth.      aspirin (ASA) 81 MG EC tablet Take 1 tablet (81 mg) by mouth daily      atorvastatin (LIPITOR) 40 MG tablet TAKE 1 TABLET BY MOUTH AT BEDTIME 90 tablet 1    calcium carbonate (OS-GENE 500 MG [place]. [place]) 500 MG tablet Take 1,000 mg by mouth daily      Cyanocobalamin (VITAMIN B-12 PO) Take 1,000 mg by mouth daily      lecithin 1200 MG CAPS Take 400 mg by mouth daily       losartan (COZAAR) 25 MG tablet Take 1 tablet by mouth twice daily 180 tablet 0    metoprolol tartrate (LOPRESSOR) 50 MG tablet Take 1 tablet by mouth twice daily 180 tablet 1    Omega-3 Fatty Acids (FISH OIL) 1200 MG capsule Take 2 capsules by mouth daily       tamsulosin (FLOMAX) 0.4 MG capsule Take 1 capsule by mouth once daily 90 capsule 0    VITAMIN D, CHOLECALCIFEROL, PO Take 5,000 Units by mouth daily      vitamin E 400 UNITS TABS Take 400 Units by mouth daily      escitalopram (LEXAPRO) 10 MG tablet Take 1 tablet by mouth once daily (Patient not taking: Reported on 2024) 90 tablet 0     Current providers sharing in care for this patient include:  Patient Care  "Team:  Riddhi Lopez MD as PCP - General (Family Practice)  Riddhi Lopez MD as Assigned PCP    The following health maintenance items are reviewed in Epic and correct as of today:  Health Maintenance   Topic Date Due    ANNUAL REVIEW OF  ORDERS  Never done    ZOSTER IMMUNIZATION (1 of 2) Never done    RSV VACCINE (Pregnancy & 60+) (1 - 1-dose 60+ series) Never done    Pneumococcal Vaccine: 65+ Years (1 of 1 - PCV) Never done    COVID-19 Vaccine (5 - 2023-24 season) 09/01/2023    INFLUENZA VACCINE (Season Ended) 09/01/2024    COLORECTAL CANCER SCREENING  09/09/2024    BMP  11/29/2024    LIPID  05/29/2025    MEDICARE ANNUAL WELLNESS VISIT  06/05/2025    FALL RISK ASSESSMENT  06/05/2025    GLUCOSE  05/29/2027    ADVANCE CARE PLANNING  01/31/2028    DTAP/TDAP/TD IMMUNIZATION (3 - Td or Tdap) 06/10/2031    HEPATITIS C SCREENING  Completed    PHQ-2 (once per calendar year)  Completed    IPV IMMUNIZATION  Aged Out    HPV IMMUNIZATION  Aged Out    MENINGITIS IMMUNIZATION  Aged Out    RSV MONOCLONAL ANTIBODY  Aged Out    LUNG CANCER SCREENING  Discontinued         Review of Systems  Constitutional, neuro, ENT, endocrine, pulmonary, cardiac, gastrointestinal, genitourinary, musculoskeletal, integument and psychiatric systems are negative, except as otherwise noted.     Objective    Exam  /76   Pulse 95   Temp 98.5  F (36.9  C) (Oral)   Resp 20   Ht 1.791 m (5' 10.5\")   Wt 114.5 kg (252 lb 6.4 oz)   SpO2 92%   BMI 35.70 kg/m     Estimated body mass index is 35.7 kg/m  as calculated from the following:    Height as of this encounter: 1.791 m (5' 10.5\").    Weight as of this encounter: 114.5 kg (252 lb 6.4 oz).    Physical Exam  GENERAL: alert and no distress  EYES: Eyes grossly normal to inspection, PERRL and conjunctivae and sclerae normal  HENT: ear canals and TM's normal, nose and mouth without ulcers or lesions  NECK: no adenopathy, no asymmetry, masses, or scars  RESP: lungs clear to " auscultation - no rales, rhonchi or wheezes  CV: regular rate and rhythm, normal S1 S2, no S3 or S4, no murmur, click or rub, no peripheral edema  ABDOMEN: soft, nontender, no hepatosplenomegaly, no masses and bowel sounds normal  MS: no gross musculoskeletal defects noted, no edema  SKIN: no suspicious lesions or rashes  NEURO: Normal strength and tone, mentation intact and speech normal  PSYCH: mentation appears normal, affect normal/bright        6/5/2024   Mini Cog   Mini-Cog Not Completed (choose reason) Patient declines   Normal cognition based on my direct observation during interview and exam.     Patient declines, there are NO concerns for cognitive deficits.           Signed Electronically by: Riddhi Lopez MD

## 2024-06-05 NOTE — PATIENT INSTRUCTIONS
"Start Nasonex nose spray to help with hearing and maybe the ringing  You will be called to schedule the hearing and ENT appointments    Preventive Care Advice   This is general advice we often give to help people stay healthy. Your care team may have specific advice just for you. Please talk to your care team about your own preventive care needs.  Lifestyle  Exercise at least 150 minutes each week (30 minutes a day, 5 days a week).  Do muscle strengthening activities 2 days a week. These help control your weight and prevent disease.  No smoking.  Wear sunscreen to prevent skin cancer.  Have your home tested for radon every 2 to 5 years. Radon is a colorless, odorless gas that can harm your lungs. To learn more, go to www.health.[facility].[place].us and search for \"Radon in Homes.\"  Keep guns unloaded and locked up in a safe place like a safe or gun vault, or, use a gun lock and hide the keys. Always lock away bullets separately. To learn more, visit 88tc88.mn.gov and search for \"safe gun storage.\"  Nutrition  Eat 5 or more servings of fruits and vegetables each day.  Try wheat bread, brown rice and whole grain pasta (instead of white bread, rice, and pasta).  Get enough calcium and vitamin D. Check the label on foods and aim for 100% of the RDA (recommended daily allowance).  Regular exams  Have a dental exam and cleaning every 6 months.  See your health care team every year to talk about:  Any changes in your health.  Any medicines your care team has prescribed.  Preventive care, family planning, and ways to prevent chronic diseases.  Shots (vaccines)   HPV shots (up to age 26), if you've never had them before.  Hepatitis B shots (up to age 59), if you've never had them before.  COVID-19 shot: Get this shot when it's due.  Flu shot: Get a flu shot every year.  Tetanus shot: Get a tetanus shot every 10 years.  Pneumococcal, hepatitis A, and RSV shots: Ask your care team if you need these based on your risk.  Shingles shot (for " age 50 and up).  General health tests  Diabetes screening:  Starting at age 35, Get screened for diabetes at least every 3 years.  If you are younger than age 35, ask your care team if you should be screened for diabetes.  Cholesterol test: At age 39, start having a cholesterol test every 5 years, or more often if advised.  Bone density scan (DEXA): At age 50, ask your care team if you should have this scan for osteoporosis (brittle bones).  Hepatitis C: Get tested at least once in your life.  Abdominal aortic aneurysm screening: Talk to your doctor about having this screening if you:  Have ever smoked; and  Are biologically male; and  Are between the ages of 65 and 75.  STIs (sexually transmitted infections)  Before age 24: Ask your care team if you should be screened for STIs.  After age 24: Get screened for STIs if you're at risk. You are at risk for STIs (including HIV) if:  You are sexually active with more than one person.  You don't use condoms every time.  You or a partner was diagnosed with a sexually transmitted infection.  If you are at risk for HIV, ask about PrEP medicine to prevent HIV.  Get tested for HIV at least once in your life, whether you are at risk for HIV or not.  Cancer screening tests  Cervical cancer screening: If you have a cervix, begin getting regular cervical cancer screening tests at age 21. Most people who have regular screenings with normal results can stop after age 65. Talk about this with your provider.  Breast cancer scan (mammogram): If you've ever had breasts, begin having regular mammograms starting at age 40. This is a scan to check for breast cancer.  Colon cancer screening: It is important to start screening for colon cancer at age 45.  Have a colonoscopy test every 10 years (or more often if you're at risk) Or, ask your provider about stool tests like a FIT test every year or Cologuard test every 3 years.  To learn more about your testing options, visit:  www.[employer]/896626.pdf.  For help making a decision, visit: christina.deb/wj45843.  Prostate cancer screening test: If you have a prostate and are age 55 to 69, ask your provider if you would benefit from a yearly prostate cancer screening test.  Lung cancer screening: If you are a current or former smoker age 50 to 80, ask your care team if ongoing lung cancer screenings are right for you.  For informational purposes only. Not to replace the advice of your health care provider. Copyright   2023 [facility]. All rights reserved. Clinically reviewed by the [facility] Transitions Program. [employer] 911015 - REV 04/24.

## 2024-06-24 ENCOUNTER — NURSE TRIAGE (OUTPATIENT)
Dept: FAMILY MEDICINE | Facility: CLINIC | Age: 79
End: 2024-06-24
Payer: COMMERCIAL

## 2024-06-24 NOTE — TELEPHONE ENCOUNTER
This encounter is being created due to the [employer] message dated 6/24/2024 as written below:         Last /82 at about noon    Denies: chest pain, SOB, trouble breathing, weakness, dizziness.    Patient reports that he has taken ibuprofen for his headache today and that does help. Headaches currently rated 1/10.  The tingling in his feet/soles is all the time and has been going on for a length of time and is not new.      The wobbling that he spoke of above has been going on for about 6 months.  He doesn't feel like he has a fever or he is ill.   He has had night sweats, around his collar, for the last 2 nights. He does have air conditioning.   He reports a quarter size bruise on his right arm above his elbow.  He is unsure how it got there and reports that he usually doesn't bruise easily. He does not have any other at this time.     Nursing advice: Patient to be seen in clinic today or tomorrow.  He was assisted in making the appointment as listed below.  Patient was given signs and symptoms to go to the E.R. or call 911.  He was notified he has access to a nurse 24 hours a day by calling [phone].  Patient verbalizes good understanding, agrees with plan and states he needs no further support. Elvira Cano R.N.    Next 5 appointments (look out 90 days)      Jun 25, 2024 11:00 AM  (Arrive by 10:40 AM)  Provider Visit with ARELY Paz CNP  [facility] ([facility] ) [address] [facility] [address] [phone]          Reason for Disposition   Unexplained headache that is present > 24 hours    Additional Information   Negative: Difficult to awaken or acting confused (e.g., disoriented, slurred speech)   Negative: Weakness of the face, arm or leg on one side of the body and new-onset   Negative: Numbness of the face, arm or leg on one side of the body and new-onset   Negative: Loss of speech or garbled speech and new-onset   Negative:  Passed out (i.e., fainted, collapsed and was not responding)   Negative: Sounds like a life-threatening emergency to the triager   Negative: Unable to walk without falling   Negative: Stiff neck (can't touch chin to chest)   Negative: Possibility of carbon monoxide exposure   Negative: SEVERE headache, states 'worst headache' of life   Negative: SEVERE headache, sudden-onset (i.e., reaching maximum intensity within seconds to 1 hour)   Negative: Severe pain in one eye   Negative: Loss of vision or double vision  (Exception: Same as prior migraines.)   Negative: Patient sounds very sick or weak to the triager   Negative: Fever > 103 F (39.4 C)   Negative: Fever > 100.0 F (37.8 C) and has diabetes mellitus or a weak immune system (e.g., HIV positive, cancer chemotherapy, organ transplant, splenectomy, chronic steroids)   Negative: SEVERE headache (e.g., excruciating) and has had severe headaches before   Negative: SEVERE headache and not relieved by pain meds   Negative: SEVERE headache and vomiting   Negative: SEVERE headache and fever   Negative: New headache and weak immune system (e.g., HIV positive, cancer chemo, splenectomy, organ transplant, chronic steroids)   Negative: Fever present > 3 days (72 hours)   Negative: Patient wants to be seen   Negative: SEVERE difficulty breathing (e.g., struggling for each breath, speaks in single words)   Negative: Shock suspected (e.g., cold/pale/clammy skin, too weak to stand, low BP, rapid pulse)   Negative: Sounds like a life-threatening emergency to the triager   Negative: Difficulty breathing   Negative: Patient sounds very sick or weak to the triager   Negative: [1] SEVERE sweating (e.g., drenched with sweat) AND [2] cause unknown   Negative: [1] NIGHT SWEATS occur (e.g., drenching sweat that occurs at night and has to change bed clothes or bed sheets) AND [2] cause unknown    Protocols used: Headache-A-OH, Xtilcipd-A-KJ

## 2024-06-25 ENCOUNTER — OFFICE VISIT (OUTPATIENT)
Dept: FAMILY MEDICINE | Facility: CLINIC | Age: 79
End: 2024-06-25
Payer: COMMERCIAL

## 2024-06-25 VITALS
SYSTOLIC BLOOD PRESSURE: 135 MMHG | WEIGHT: 255.2 LBS | DIASTOLIC BLOOD PRESSURE: 83 MMHG | OXYGEN SATURATION: 95 % | RESPIRATION RATE: 18 BRPM | BODY MASS INDEX: 35.73 KG/M2 | HEIGHT: 71 IN | HEART RATE: 83 BPM | TEMPERATURE: 97.8 F

## 2024-06-25 DIAGNOSIS — G44.209 TENSION HEADACHE: ICD-10-CM

## 2024-06-25 DIAGNOSIS — I10 HYPERTENSION GOAL BP (BLOOD PRESSURE) < 140/90: Primary | ICD-10-CM

## 2024-06-25 DIAGNOSIS — F41.9 ANXIETY: ICD-10-CM

## 2024-06-25 PROCEDURE — 99214 OFFICE O/P EST MOD 30 MIN: CPT | Performed by: NURSE PRACTITIONER

## 2024-06-25 RX ORDER — RESPIRATORY SYNCYTIAL VIRUS VACCINE 120MCG/0.5
0.5 KIT INTRAMUSCULAR ONCE
Qty: 1 EACH | Refills: 0 | Status: CANCELLED | OUTPATIENT
Start: 2024-06-25 | End: 2024-06-25

## 2024-06-25 ASSESSMENT — PAIN SCALES - GENERAL: PAINLEVEL: NO PAIN (0)

## 2024-06-25 NOTE — PROGRESS NOTES
Assessment & Plan     Hypertension goal BP (blood pressure) < 140/90  -Discussed how the upcoming stress of his trip and recent death of his friend can cause elevated blood pressure readings. As his blood pressure and lab results were at goal 3 weeks ago during his routine physical recommend we continue to monitor his blood pressure over the next few weeks until he returns from his trip.     Tension headache  -Likely related to the increased stress and upcoming trip, improved with ibuprofen.     Anxiety  -Discussed how stopping escitalopram can cause some worsening of anxiety/depression symptoms which may be accounting for his elevated blood pressure readings. Recommend restarting this and taking with other scheduled medications. Patient also interested in therapy to talk about his anxiety management, referral placed.   - Adult Mental Health  Referral; Future                Subjective   Morris is a 78 year old, presenting for the following health issues:  RECHECK        6/25/2024    10:46 AM   Additional Questions   Roomed by Olga SILVESTRE   Accompanied by self       1. Rm Maldonado is a 78 year old male who presents today for complaints of HEADACHE(S) for 2 day.   Headache location and description: back of the head and top of neck,.  Headache frequency: episodic  Associated symptoms: neck pain   Exacerbating factors: none  Alleviating factors: NSAIDs  Previous headache Hx is positive for: no previous  Past treatment regiments: Ibuprofen      2. Blood pressure  Yesterday bp reading 158/102 at 10:38 am, just before he took his medication. 1.5 hours after taking his medication and it came down to 131/82.  He has been monitoring his blood pressure daily and has noticed his diastolic readings have been in low 90s.     Is going to be traveling- was supposed to leave yesterday to fly to [place] then drives back. Was having some increased stress with the up coming trip. Had to coordinate lots of rides, friends in  "[place] coming out the hospital, one friend passed away just a week ago and is going back for the celebration of life. Niece's  also passed away recently. States he doesn't do well with death.     Sleeping: fine  Appetite: decent, no change in diet recently. Avoiding adding salt.  Exercise: none    He recently stopped taking the escitalopram. Has noticed he gets increased anxiety around holidays \"due to past events\" and his blood pressure will increase. He found he doesn't struggle as much with the increased stressed/anxiety around the holidays since starting the escitalopram but he admits he has been infrequent in taking it.     Ankles no more swollen than usual.         History of Present Illness       Hypertension: He presents for follow up of hypertension.  He does check blood pressure  regularly outside of the clinic. Outside blood pressures have been over 140/90. He follows a low salt diet.     He eats 0-1 servings of fruits and vegetables daily.He consumes 0 sweetened beverage(s) daily.He exercises with enough effort to increase his heart rate 9 or less minutes per day.  He exercises with enough effort to increase his heart rate 3 or less days per week.   He is taking medications regularly.             Objective    /83   Pulse 83   Temp 97.8  F (36.6  C) (Tympanic)   Resp 18   Ht 1.791 m (5' 10.5\")   Wt 115.8 kg (255 lb 3.2 oz)   SpO2 95%   BMI 36.10 kg/m    Body mass index is 36.1 kg/m .  Physical Exam  Constitutional:       Appearance: Normal appearance. He is not ill-appearing.   HENT:      Right Ear: External ear normal.      Left Ear: External ear normal.      Nose: Nose normal.   Cardiovascular:      Rate and Rhythm: Normal rate and regular rhythm.      Pulses: Normal pulses.      Heart sounds: Normal heart sounds. No murmur heard.     No friction rub. No gallop.   Pulmonary:      Effort: Pulmonary effort is normal.      Breath sounds: Normal breath sounds. No wheezing, rhonchi or " rales.   Musculoskeletal:      Cervical back: Normal range of motion and neck supple.      Right lower le+ Edema present.      Left lower le+ Edema present.   Skin:     General: Skin is warm and dry.   Neurological:      General: No focal deficit present.      Mental Status: He is alert and oriented to person, place, and time.   Psychiatric:         Mood and Affect: Mood normal.         Behavior: Behavior normal.         Thought Content: Thought content normal.         Judgment: Judgment normal.            Lab on 2024   Component Date Value Ref Range Status    Sodium 2024 140  135 - 145 mmol/L Final    Reference intervals for this test were updated on 2023 to more accurately reflect our healthy population. There may be differences in the flagging of prior results with similar values performed with this method. Interpretation of those prior results can be made in the context of the updated reference intervals.     Potassium 2024 4.3  3.4 - 5.3 mmol/L Final    Chloride 2024 102  98 - 107 mmol/L Final    Carbon Dioxide (CO2) 2024 29  22 - 29 mmol/L Final    Anion Gap 2024 9  7 - 15 mmol/L Final    Urea Nitrogen 2024 15.4  8.0 - 23.0 mg/dL Final    Creatinine 2024 0.84  0.67 - 1.17 mg/dL Final    GFR Estimate 2024 90  >60 mL/min/1.73m2 Final    Calcium 2024 8.9  8.8 - 10.2 mg/dL Final    Glucose 2024 87  70 - 99 mg/dL Final    Patient Fasting > 8hrs? 2024 Yes   Final    Cholesterol 2024 156  <200 mg/dL Final    Triglycerides 2024 66  <150 mg/dL Final    Direct Measure HDL 2024 59  >=40 mg/dL Final    LDL Cholesterol Calculated 2024 84  <=100 mg/dL Final    Non HDL Cholesterol 2024 97  <130 mg/dL Final    Patient Fasting > 8hrs? 2024 Yes   Final           Signed Electronically by: ARELY Paz CNP

## 2024-06-29 ENCOUNTER — NURSE TRIAGE (OUTPATIENT)
Dept: NURSING | Facility: CLINIC | Age: 79
End: 2024-06-29
Payer: COMMERCIAL

## 2024-06-29 NOTE — TELEPHONE ENCOUNTER
Patient reporting flu or bad cold.  Patient is visiting in [place], [place].  Writer advised patient call a local health care facility to reach a local nurse line, and gave names of some facilities near him.  Patient declined need for phone numbers but otherwise verbalized understanding and agrees with plan.    Violeta Mccoy RN  [facility]

## 2024-11-12 NOTE — TELEPHONE ENCOUNTER
Please see previous [employer] message 4/20/21.  Regarding medication refills.  Advised appointment.  Patient wants medication refilled without appointment.  Per refill guidelines can be refilled.      Please advise on refill.  Samia Hernandez RN     
Felisha Montanez

## 2024-12-29 DIAGNOSIS — N40.1 BENIGN PROSTATIC HYPERPLASIA WITH URINARY FREQUENCY: ICD-10-CM

## 2024-12-29 DIAGNOSIS — R35.0 BENIGN PROSTATIC HYPERPLASIA WITH URINARY FREQUENCY: ICD-10-CM

## 2024-12-29 DIAGNOSIS — I10 HYPERTENSION GOAL BP (BLOOD PRESSURE) < 140/90: Primary | ICD-10-CM

## 2024-12-29 RX ORDER — TAMSULOSIN HYDROCHLORIDE 0.4 MG/1
0.4 CAPSULE ORAL DAILY
Qty: 90 CAPSULE | Refills: 1 | Status: SHIPPED | OUTPATIENT
Start: 2024-12-29

## 2024-12-29 NOTE — LETTER
December 30, 2024    Rm Maldonado  [address] [facility] [address]    Hi Morris,     I received a refill request for tamsulosin.  I have sent a 6 month supply to your pharmacy.  You are due for labs now; I have ordered the tests, please call to make a non-fasting lab appointment.     Happy New Year,        Riddhi Lopez M.D./bmc  [phone]          Electronically signed

## 2024-12-30 NOTE — TELEPHONE ENCOUNTER
Please mail letter    Gio Caceres,    I received a refill request for tamsulosin.  I have sent a 6 month supply to your pharmacy.  You are due for labs now; I have ordered the tests, please call to make a non-fasting lab appointment.    Happy New Year,  Riddhi Lopez MD

## 2024-12-30 NOTE — TELEPHONE ENCOUNTER
Letter with provider's message given verbatim mailed to patient's home address on file.  Dione KING    [facility]

## 2025-01-14 ENCOUNTER — MYC MEDICAL ADVICE (OUTPATIENT)
Dept: FAMILY MEDICINE | Facility: CLINIC | Age: 80
End: 2025-01-14
Payer: COMMERCIAL

## 2025-01-14 DIAGNOSIS — Z12.5 SCREENING FOR PROSTATE CANCER: ICD-10-CM

## 2025-01-14 DIAGNOSIS — D12.6 ADENOMATOUS POLYP OF COLON, UNSPECIFIED PART OF COLON: Primary | ICD-10-CM

## 2025-01-15 ENCOUNTER — LAB (OUTPATIENT)
Dept: LAB | Facility: CLINIC | Age: 80
End: 2025-01-15
Payer: COMMERCIAL

## 2025-01-15 DIAGNOSIS — Z12.5 SCREENING FOR PROSTATE CANCER: ICD-10-CM

## 2025-01-15 DIAGNOSIS — I10 HYPERTENSION GOAL BP (BLOOD PRESSURE) < 140/90: ICD-10-CM

## 2025-01-15 PROCEDURE — 80048 BASIC METABOLIC PNL TOTAL CA: CPT

## 2025-01-15 PROCEDURE — 36415 COLL VENOUS BLD VENIPUNCTURE: CPT

## 2025-01-15 PROCEDURE — G0103 PSA SCREENING: HCPCS

## 2025-01-16 LAB
ANION GAP SERPL CALCULATED.3IONS-SCNC: 10 MMOL/L (ref 7–15)
BUN SERPL-MCNC: 12.3 MG/DL (ref 8–23)
CALCIUM SERPL-MCNC: 9 MG/DL (ref 8.8–10.4)
CHLORIDE SERPL-SCNC: 103 MMOL/L (ref 98–107)
CREAT SERPL-MCNC: 0.81 MG/DL (ref 0.67–1.17)
EGFRCR SERPLBLD CKD-EPI 2021: 90 ML/MIN/1.73M2
GLUCOSE SERPL-MCNC: 88 MG/DL (ref 70–99)
HCO3 SERPL-SCNC: 28 MMOL/L (ref 22–29)
POTASSIUM SERPL-SCNC: 4.1 MMOL/L (ref 3.4–5.3)
PSA SERPL DL<=0.01 NG/ML-MCNC: 2.55 NG/ML (ref 0–6.5)
SODIUM SERPL-SCNC: 141 MMOL/L (ref 135–145)

## 2025-03-10 ENCOUNTER — TELEPHONE (OUTPATIENT)
Dept: GASTROENTEROLOGY | Facility: CLINIC | Age: 80
End: 2025-03-10
Payer: COMMERCIAL

## 2025-03-10 NOTE — TELEPHONE ENCOUNTER
"[facility].     Per last colonoscopy (2019) \"The colonoscopy was                             unusually difficult due to a redundant colon,                             significant looping and a tortuous colon. The                             patient tolerated the procedure well.\"     --------------------------------------------------------------------------------------------------------------------    Procedure details:    Patient scheduled for Colonoscopy on 3/26/25.     Approximate arrival time: 1045. Procedure time 1130.   *Ensure patient is aware that endoscopy team will be calling about 2 days prior to procedure date to confirm arrival time as this may change.     Facility location: [facility] [place]; [address] [place] [address] [address] Floor, [place] [address]. Check in location: 2nd Floor at Surgery desk.  *Disclaimer: Drivers are to check in with patient and stay on campus during procedure.     Sedation type: Conscious sedation     Pre op exam needed? No.    Indication for procedure: History of adenomatous polyp of colon       Chart review:     Electronic implanted devices? No    Recent diagnosis of diverticulitis within the last 6 weeks? No      Medication review:    Diabetic? No    Anticoagulants? No    Weight loss medication/injectable? No GLP-1 medication per patient's medication list. Nursing to verify with pre-assessment call.    Other medication HOLDING recommendations:  N/A      Prep for procedure:     Bowel prep recommendation: Standard Miralax.   Due to: standard bowel prep    Procedure information and instructions sent via [employer]         Tamra Lim RN  Endoscopy Procedure Pre Assessment   [phone] option 3  "

## 2025-03-12 NOTE — TELEPHONE ENCOUNTER
Response from Lexus Fabian MD Haataja, Nicole L, RN    Yes.     Natty Lim, RN  Endoscopy Procedure Pre Assessment RN

## 2025-03-12 NOTE — TELEPHONE ENCOUNTER
Pre assessment completed for upcoming procedure.   (Please see previous telephone encounter notes for complete details)      Procedure details:    Approximate time and facility location reviewed.   Patient is aware that endoscopy team will be calling about 2 days prior to confirm arrival time.    Designated  policy reviewed and that site requests drivers to check in and stay on campus. Instructed to have someone stay 6  hours post procedure.   *Disclaimer - please notify the [facility] RN GI staff with any  issues/concerns.    Medication review:    Medications reviewed. Please see supporting documentation below. Holding recommendations discussed (if applicable).       Prep for procedure:     Procedure prep instructions reviewed.        Any additional information needed:  N/A      Patient verbalized understanding and had no questions or concerns at this time.      Tamra Lim RN  Endoscopy Procedure Pre Assessment   [phone] option 3

## 2025-03-24 ENCOUNTER — TELEPHONE (OUTPATIENT)
Dept: GASTROENTEROLOGY | Facility: CLINIC | Age: 80
End: 2025-03-24
Payer: COMMERCIAL

## 2025-03-26 ENCOUNTER — HOSPITAL ENCOUNTER (OUTPATIENT)
Facility: AMBULATORY SURGERY CENTER | Age: 80
Discharge: HOME | End: 2025-03-26
Attending: STUDENT IN AN ORGANIZED HEALTH CARE EDUCATION/TRAINING PROGRAM | Admitting: STUDENT IN AN ORGANIZED HEALTH CARE EDUCATION/TRAINING PROGRAM
Payer: COMMERCIAL

## 2025-03-26 VITALS
HEART RATE: 77 BPM | DIASTOLIC BLOOD PRESSURE: 80 MMHG | RESPIRATION RATE: 16 BRPM | SYSTOLIC BLOOD PRESSURE: 119 MMHG | BODY MASS INDEX: 35.7 KG/M2 | HEIGHT: 71 IN | TEMPERATURE: 98 F | WEIGHT: 255 LBS | OXYGEN SATURATION: 95 %

## 2025-03-26 LAB — COLONOSCOPY: NORMAL

## 2025-03-26 PROCEDURE — G8907 PT DOC NO EVENTS ON DISCHARG: HCPCS

## 2025-03-26 PROCEDURE — G8918 PT W/O PREOP ORDER IV AB PRO: HCPCS

## 2025-03-26 PROCEDURE — G0105 COLORECTAL SCRN; HI RISK IND: HCPCS

## 2025-03-26 RX ORDER — ONDANSETRON 2 MG/ML
4 INJECTION INTRAMUSCULAR; INTRAVENOUS
Status: DISCONTINUED | OUTPATIENT
Start: 2025-03-26 | End: 2025-03-27 | Stop reason: HOSPADM

## 2025-03-26 RX ORDER — PROCHLORPERAZINE MALEATE 5 MG/1
5 TABLET ORAL EVERY 6 HOURS PRN
Status: DISCONTINUED | OUTPATIENT
Start: 2025-03-26 | End: 2025-03-27 | Stop reason: HOSPADM

## 2025-03-26 RX ORDER — NALOXONE HYDROCHLORIDE 0.4 MG/ML
0.2 INJECTION, SOLUTION INTRAMUSCULAR; INTRAVENOUS; SUBCUTANEOUS
Status: DISCONTINUED | OUTPATIENT
Start: 2025-03-26 | End: 2025-03-27 | Stop reason: HOSPADM

## 2025-03-26 RX ORDER — NALOXONE HYDROCHLORIDE 0.4 MG/ML
0.4 INJECTION, SOLUTION INTRAMUSCULAR; INTRAVENOUS; SUBCUTANEOUS
Status: DISCONTINUED | OUTPATIENT
Start: 2025-03-26 | End: 2025-03-27 | Stop reason: HOSPADM

## 2025-03-26 RX ORDER — FLUMAZENIL 0.1 MG/ML
0.2 INJECTION, SOLUTION INTRAVENOUS
Status: ACTIVE | OUTPATIENT
Start: 2025-03-26 | End: 2025-03-26

## 2025-03-26 RX ORDER — FENTANYL CITRATE 50 UG/ML
INJECTION, SOLUTION INTRAMUSCULAR; INTRAVENOUS PRN
Status: DISCONTINUED | OUTPATIENT
Start: 2025-03-26 | End: 2025-03-26 | Stop reason: HOSPADM

## 2025-03-26 RX ORDER — ONDANSETRON 4 MG/1
4 TABLET, ORALLY DISINTEGRATING ORAL EVERY 6 HOURS PRN
Status: DISCONTINUED | OUTPATIENT
Start: 2025-03-26 | End: 2025-03-27 | Stop reason: HOSPADM

## 2025-03-26 RX ORDER — LIDOCAINE 40 MG/G
CREAM TOPICAL
Status: DISCONTINUED | OUTPATIENT
Start: 2025-03-26 | End: 2025-03-27 | Stop reason: HOSPADM

## 2025-03-26 RX ORDER — ONDANSETRON 2 MG/ML
4 INJECTION INTRAMUSCULAR; INTRAVENOUS EVERY 6 HOURS PRN
Status: DISCONTINUED | OUTPATIENT
Start: 2025-03-26 | End: 2025-03-27 | Stop reason: HOSPADM

## 2025-03-26 NOTE — H&P
Pre-Endoscopy History and Physical     Rm Maldonado MRN# [id number]   YOB: 1945 Age: 79 year old     Date of Procedure: 03/26/25  Primary care provider: Riddhi Lopez  Type of Endoscopy: Colonoscopy  Reason for Procedure: Surveillance personal history of adenoma including on most recent colonoscopy 6 years ago  Type of Anesthesia Anticipated: Moderate Sedation    HPI:    Rm is a 79 year old male who will be undergoing the above procedure.      Prior colonoscopy:   2019 Dr. Manzanares, complete, difficult. Findings: TVC polyp (F150 V3, benadryl) - TA  2016 [place], complete, difficult. Findings; TVC polyps x 3.  (F150 V3, benadryl) - all 3 TA  2013  [facility], complete, difficult. Findings: 1 mm at ICV. (F150 V3, benadryl)  - normal path  8/2010 [facility], complete,  5mm ascending colon polyp without difficulty. (F150 V3)  3/2010 [facility], complete, without difficulty. 44 mm in prox ascending colon partially removed (F150 V3)    Path also from 2011 Dr. Manzanares, prox TVC polyp frag of TA. I can't find the path from the 2010 scopes.     A history and physical has been performed. The patient's medications and allergies have been reviewed. The risks and benefits of the procedure and the sedation options and risks were discussed with the patient.  All questions were answered and informed consent was obtained.      He denies a personal or family history of anesthesia complications or bleeding disorders.   denies a family history of CRC.  denies a family history of IBD.  denies changes in bowel habits.  denies blood in stool.     No Known Allergies   Allergy to Latex: NO   Intolerances: NO     Current Outpatient Medications   Medication Sig Dispense Refill    Ascorbic Acid (VITAMIN C PO) Take 2,000 mg by mouth.      aspirin (ASA) 81 MG EC tablet Take 325 mg by mouth daily      atorvastatin (LIPITOR) 40 MG tablet Take 1 tablet (40 mg) by mouth at bedtime 90 tablet 3    calcium carbonate  (OS-GENE 500 MG [place]. [place]) 500 MG tablet Take 1,000 mg by mouth daily      Cyanocobalamin (VITAMIN B-12 PO) Take 1,000 mg by mouth daily      hydrOXYzine HCl (ATARAX) 10 MG tablet Take 1 tablet (10 mg) by mouth nightly as needed for other (insomia/sleep) 90 tablet 1    lecithin 1200 MG CAPS Take 400 mg by mouth daily       losartan (COZAAR) 25 MG tablet Take 1 tablet (25 mg) by mouth 2 times daily 180 tablet 1    metoprolol tartrate (LOPRESSOR) 50 MG tablet Take 1 tablet (50 mg) by mouth 2 times daily 180 tablet 1    Omega-3 Fatty Acids (FISH OIL) 1200 MG capsule Take 2 capsules by mouth daily       tamsulosin (FLOMAX) 0.4 MG capsule Take 1 capsule by mouth once daily 90 capsule 1    VITAMIN D, CHOLECALCIFEROL, PO Take 5,000 Units by mouth daily      vitamin E 400 UNITS TABS Take 400 Units by mouth daily      escitalopram (LEXAPRO) 10 MG tablet Take 1 tablet (10 mg) by mouth daily (Patient not taking: Reported on 6/25/2024) 90 tablet 1    mometasone (NASONEX) 50 MCG/ACT nasal spray Spray 2 sprays into both nostrils daily (Patient not taking: Reported on 6/25/2024) 17 g 3     Current Facility-Administered Medications   Medication Dose Route Frequency Provider Last Rate Last Admin    lidocaine (LMX4) kit   Topical Q1H PRN Lexus Fabian MD        lidocaine 1 % 0.1-1 mL  0.1-1 mL Other Q1H PRN Lexus Fabian MD        ondansetron (ZOFRAN) injection 4 mg  4 mg Intravenous Once PRN Lexus Fabian MD        sodium chloride (PF) 0.9% PF flush 3 mL  3 mL Intracatheter Q8H [facility] Lexus Fabian MD        sodium chloride (PF) 0.9% PF flush 3 mL  3 mL Intracatheter q1 min prn Lexus Fabian MD           Patient Active Problem List   Diagnosis    Hypertension goal BP (blood pressure) < 140/90    Eczema    Vitamin D deficiency    Colon polyp    Paralyzed hemidiaphragm    Hyperlipidemia LDL goal <130    Venous stasis dermatitis of left lower extremity    Bilateral leg edema    Class 2 severe obesity due to excess  "calories with serious comorbidity and body mass index (BMI) of 35.0 to 35.9 in adult (H)    Vertical heterophoria    Carotid atherosclerosis, bilateral    Atherosclerosis of abdominal aorta    Combined forms of age-related cataract, right eye        Past Medical History:   Diagnosis Date    Arthritis     Heart disease     Hemidiaphragm paralysis     left    History of hernia repair     HTN     Obesity     Other and unspecified hyperlipidemia         Past Surgical History:   Procedure Laterality Date    ABDOMEN SURGERY      BIOPSY      COLONOSCOPY      COLONOSCOPY WITH CO2 INSUFFLATION N/A 2016    Procedure: COLONOSCOPY WITH CO2 INSUFFLATION;  Surgeon: Ariel Manzanares MD;  Location: [facility] OR    COLONOSCOPY WITH CO2 INSUFFLATION N/A 2019    Procedure: COLONOSCOPY, WITH CO2 INSUFFLATION;  Surgeon: Ariel Manzanares MD;  Location: [facility] OR    EYE SURGERY      left cataract     HERNIA REPAIR      HERNIA REPAIR      TONSILLECTOMY         Social History     Tobacco Use    Smoking status: Former     Current packs/day: 0.00     Average packs/day: 1 pack/day for 15.3 years (15.3 ttl pk-yrs)     Types: Cigarettes, Pipe     Start date: 5/15/1968     Quit date: 1983     Years since quittin.6    Smokeless tobacco: Never   Substance Use Topics    Alcohol use: Yes     Comment: Minimal to light       Family History   Problem Relation Age of Onset    C.A.D. Mother     Heart Disease Mother     Diabetes Mother     Obesity Mother     Alcohol/Drug Father     Obesity Daughter        REVIEW OF SYSTEMS:     5 point ROS negative except as noted above in HPI, including Gen., Resp., CV, GI &  system review.      PHYSICAL EXAM:   BP (!) 157/102   Pulse 80   Temp 98  F (36.7  C) (Temporal)   Resp 16   Ht 1.803 m (5' 11\")   Wt 115.7 kg (255 lb)   SpO2 99%   BMI 35.57 kg/m   Estimated body mass index is 35.57 kg/m  as calculated from the following:    Height as of this encounter: 1.803 m (5' 11\").    Weight as " of this encounter: 115.7 kg (255 lb).   All Vitals have been reviewed.    GENERAL APPEARANCE: healthy and alert  MENTAL STATUS: alert  AIRWAY EXAM: Mallampatti Class I (visualization of the soft palate, fauces, uvula, anterior and posterior pillars)  RESP: lungs clear to auscultation - no rales, rhonchi or wheezes  CV: regular rates and rhythm      IMPRESSION   ASA Class 2 - Mild systemic disease        PLAN:     Plan for colonoscopy. We discussed the risks, benefits and alternatives and the patient wished to proceed.          Lexus Fabian MD  [facility]  Phone: [phone]  Fax: [phone]  March 26, 2025

## 2025-05-14 ENCOUNTER — DOCUMENTATION ONLY (OUTPATIENT)
Dept: FAMILY MEDICINE | Facility: CLINIC | Age: 80
End: 2025-05-14
Payer: COMMERCIAL

## 2025-05-14 DIAGNOSIS — E78.5 HYPERLIPIDEMIA LDL GOAL <130: Primary | ICD-10-CM

## 2025-05-14 DIAGNOSIS — I10 HYPERTENSION GOAL BP (BLOOD PRESSURE) < 140/90: ICD-10-CM

## 2025-05-14 NOTE — PROGRESS NOTES
Rm Maldonado has an upcoming lab appointment:    Future Appointments   Date Time Provider Department Center   5/29/2025  9:45 AM AN LAB [facility]   6/9/2025  2:00 PM [facility]       There is no order available. Please review and place either future orders or HMPO (Review of Health Maintenance Protocol Orders), as appropriate.    Health Maintenance Due   Topic    ANNUAL REVIEW OF HM ORDERS     LIPID      Tamra Miranda

## 2025-05-29 ENCOUNTER — LAB (OUTPATIENT)
Dept: LAB | Facility: CLINIC | Age: 80
End: 2025-05-29
Payer: COMMERCIAL

## 2025-05-29 DIAGNOSIS — I10 HYPERTENSION GOAL BP (BLOOD PRESSURE) < 140/90: ICD-10-CM

## 2025-05-29 DIAGNOSIS — E78.5 HYPERLIPIDEMIA LDL GOAL <130: ICD-10-CM

## 2025-05-29 LAB
ANION GAP SERPL CALCULATED.3IONS-SCNC: 9 MMOL/L (ref 7–15)
BUN SERPL-MCNC: 13.8 MG/DL (ref 8–23)
CALCIUM SERPL-MCNC: 9.2 MG/DL (ref 8.8–10.4)
CHLORIDE SERPL-SCNC: 103 MMOL/L (ref 98–107)
CHOLEST SERPL-MCNC: 171 MG/DL
CREAT SERPL-MCNC: 0.9 MG/DL (ref 0.67–1.17)
EGFRCR SERPLBLD CKD-EPI 2021: 87 ML/MIN/1.73M2
FASTING STATUS PATIENT QL REPORTED: YES
FASTING STATUS PATIENT QL REPORTED: YES
GLUCOSE SERPL-MCNC: 98 MG/DL (ref 70–99)
HCO3 SERPL-SCNC: 28 MMOL/L (ref 22–29)
HDLC SERPL-MCNC: 75 MG/DL
LDLC SERPL CALC-MCNC: 79 MG/DL
NONHDLC SERPL-MCNC: 96 MG/DL
POTASSIUM SERPL-SCNC: 4.2 MMOL/L (ref 3.4–5.3)
SODIUM SERPL-SCNC: 140 MMOL/L (ref 135–145)
TRIGL SERPL-MCNC: 83 MG/DL

## 2025-06-09 ENCOUNTER — RESULTS FOLLOW-UP (OUTPATIENT)
Dept: FAMILY MEDICINE | Facility: CLINIC | Age: 80
End: 2025-06-09

## 2025-06-09 ENCOUNTER — OFFICE VISIT (OUTPATIENT)
Dept: FAMILY MEDICINE | Facility: CLINIC | Age: 80
End: 2025-06-09
Attending: FAMILY MEDICINE
Payer: COMMERCIAL

## 2025-06-09 VITALS
BODY MASS INDEX: 35 KG/M2 | SYSTOLIC BLOOD PRESSURE: 126 MMHG | HEIGHT: 71 IN | WEIGHT: 250 LBS | RESPIRATION RATE: 18 BRPM | DIASTOLIC BLOOD PRESSURE: 82 MMHG | TEMPERATURE: 97.9 F | HEART RATE: 70 BPM | OXYGEN SATURATION: 97 %

## 2025-06-09 DIAGNOSIS — R35.0 BENIGN PROSTATIC HYPERPLASIA WITH URINARY FREQUENCY: ICD-10-CM

## 2025-06-09 DIAGNOSIS — E66.09 CLASS 1 OBESITY DUE TO EXCESS CALORIES WITH SERIOUS COMORBIDITY AND BODY MASS INDEX (BMI) OF 34.0 TO 34.9 IN ADULT: ICD-10-CM

## 2025-06-09 DIAGNOSIS — Z00.00 ENCOUNTER FOR MEDICARE ANNUAL WELLNESS EXAM: Primary | ICD-10-CM

## 2025-06-09 DIAGNOSIS — I70.0 ATHEROSCLEROSIS OF ABDOMINAL AORTA: ICD-10-CM

## 2025-06-09 DIAGNOSIS — N40.1 BENIGN PROSTATIC HYPERPLASIA WITH URINARY FREQUENCY: ICD-10-CM

## 2025-06-09 DIAGNOSIS — F41.9 ANXIETY: ICD-10-CM

## 2025-06-09 DIAGNOSIS — E66.811 CLASS 1 OBESITY DUE TO EXCESS CALORIES WITH SERIOUS COMORBIDITY AND BODY MASS INDEX (BMI) OF 34.0 TO 34.9 IN ADULT: ICD-10-CM

## 2025-06-09 DIAGNOSIS — I10 HYPERTENSION GOAL BP (BLOOD PRESSURE) < 150/90: ICD-10-CM

## 2025-06-09 DIAGNOSIS — I65.23 CAROTID ATHEROSCLEROSIS, BILATERAL: ICD-10-CM

## 2025-06-09 PROCEDURE — 3074F SYST BP LT 130 MM HG: CPT | Performed by: FAMILY MEDICINE

## 2025-06-09 PROCEDURE — G0439 PPPS, SUBSEQ VISIT: HCPCS | Performed by: FAMILY MEDICINE

## 2025-06-09 PROCEDURE — G2211 COMPLEX E/M VISIT ADD ON: HCPCS | Performed by: FAMILY MEDICINE

## 2025-06-09 PROCEDURE — 3079F DIAST BP 80-89 MM HG: CPT | Performed by: FAMILY MEDICINE

## 2025-06-09 PROCEDURE — 1126F AMNT PAIN NOTED NONE PRSNT: CPT | Performed by: FAMILY MEDICINE

## 2025-06-09 PROCEDURE — 99214 OFFICE O/P EST MOD 30 MIN: CPT | Mod: 25 | Performed by: FAMILY MEDICINE

## 2025-06-09 RX ORDER — ATORVASTATIN CALCIUM 40 MG/1
40 TABLET, FILM COATED ORAL AT BEDTIME
Qty: 90 TABLET | Refills: 3 | Status: SHIPPED | OUTPATIENT
Start: 2025-06-09

## 2025-06-09 RX ORDER — TAMSULOSIN HYDROCHLORIDE 0.4 MG/1
0.4 CAPSULE ORAL DAILY
Qty: 90 CAPSULE | Refills: 1 | Status: SHIPPED | OUTPATIENT
Start: 2025-06-09

## 2025-06-09 RX ORDER — METOPROLOL TARTRATE 50 MG
50 TABLET ORAL 2 TIMES DAILY
Qty: 180 TABLET | Refills: 1 | Status: SHIPPED | OUTPATIENT
Start: 2025-06-09

## 2025-06-09 RX ORDER — HYDROXYZINE HYDROCHLORIDE 10 MG/1
10 TABLET, FILM COATED ORAL
Qty: 90 TABLET | Refills: 1 | Status: CANCELLED | OUTPATIENT
Start: 2025-06-09

## 2025-06-09 RX ORDER — LOSARTAN POTASSIUM 25 MG/1
25 TABLET ORAL 2 TIMES DAILY
Qty: 180 TABLET | Refills: 1 | Status: SHIPPED | OUTPATIENT
Start: 2025-06-09

## 2025-06-09 SDOH — HEALTH STABILITY: PHYSICAL HEALTH: ON AVERAGE, HOW MANY DAYS PER WEEK DO YOU ENGAGE IN MODERATE TO STRENUOUS EXERCISE (LIKE A BRISK WALK)?: 0 DAYS

## 2025-06-09 ASSESSMENT — PAIN SCALES - GENERAL: PAINLEVEL_OUTOF10: NO PAIN (0)

## 2025-06-09 ASSESSMENT — SOCIAL DETERMINANTS OF HEALTH (SDOH): HOW OFTEN DO YOU GET TOGETHER WITH FRIENDS OR RELATIVES?: PATIENT DECLINED

## 2025-06-09 NOTE — PATIENT INSTRUCTIONS
Patient Education   Preventive Care Advice   This is general advice given by our system to help you stay healthy. However, your care team may have specific advice just for you. Please talk to your care team about your preventive care needs.  Nutrition  Eat 5 or more servings of fruits and vegetables each day.  Try wheat bread, brown rice and whole grain pasta (instead of white bread, rice, and pasta).  Get enough calcium and vitamin D. Check the label on foods and aim for 100% of the RDA (recommended daily allowance).  Lifestyle  Exercise at least 150 minutes each week  (30 minutes a day, 5 days a week).  Do muscle strengthening activities 2 days a week. These help control your weight and prevent disease.  No smoking.  Wear sunscreen to prevent skin cancer.  Have a dental exam and cleaning every 6 months.  Yearly exams  See your health care team every year to talk about:  Any changes in your health.  Any medicines your care team has prescribed.  Preventive care, family planning, and ways to prevent chronic diseases.  Shots (vaccines)   HPV shots (up to age 26), if you've never had them before.  Hepatitis B shots (up to age 59), if you've never had them before.  COVID-19 shot: Get this shot when it's due.  Flu shot: Get a flu shot every year.  Tetanus shot: Get a tetanus shot every 10 years.  Pneumococcal, hepatitis A, and RSV shots: Ask your care team if you need these based on your risk.  Shingles shot (for age 50 and up)  General health tests  Diabetes screening:  Starting at age 35, Get screened for diabetes at least every 3 years.  If you are younger than age 35, ask your care team if you should be screened for diabetes.  Cholesterol test: At age 39, start having a cholesterol test every 5 years, or more often if advised.  Bone density scan (DEXA): At age 50, ask your care team if you should have this scan for osteoporosis (brittle bones).  Hepatitis C: Get tested at least once in your life.  STIs (sexually  transmitted infections)  Before age 24: Ask your care team if you should be screened for STIs.  After age 24: Get screened for STIs if you're at risk. You are at risk for STIs (including HIV) if:  You are sexually active with more than one person.  You don't use condoms every time.  You or a partner was diagnosed with a sexually transmitted infection.  If you are at risk for HIV, ask about PrEP medicine to prevent HIV.  Get tested for HIV at least once in your life, whether you are at risk for HIV or not.  Cancer screening tests  Cervical cancer screening: If you have a cervix, begin getting regular cervical cancer screening tests starting at age 21.  Breast cancer scan (mammogram): If you've ever had breasts, begin having regular mammograms starting at age 40. This is a scan to check for breast cancer.  Colon cancer screening: It is important to start screening for colon cancer at age 45.  Have a colonoscopy test every 10 years (or more often if you're at risk) Or, ask your provider about stool tests like a FIT test every year or Cologuard test every 3 years.  To learn more about your testing options, visit:   .  For help making a decision, visit:   https://bit.ly/tm35313.  Prostate cancer screening test: If you have a prostate, ask your care team if a prostate cancer screening test (PSA) at age 55 is right for you.  Lung cancer screening: If you are a current or former smoker ages 50 to 80, ask your care team if ongoing lung cancer screenings are right for you.  For informational purposes only. Not to replace the advice of your health care provider. Copyright   2023 [employer]. All rights reserved. Clinically reviewed by the [facility] Transitions Program. [email or web address] [id number] - REV 01/24.

## 2025-06-09 NOTE — PROGRESS NOTES
"Preventive Care Visit  [facility]  Riddhi Lopez MD, Family Medicine  Jun 9, 2025      Assessment & Plan     (Z00.00) Encounter for Medicare annual wellness exam  (primary encounter diagnosis)  Comment: preventive needs reviewed   Plan: see orders in Epic.     (I70.0) Atherosclerosis of abdominal aorta  (I65.23) Carotid atherosclerosis, bilateral  Comment: labs reviewed  Plan: atorvastatin (LIPITOR) 40 MG tablet        Refill x 1 yr     (F41.9) Anxiety  Comment: improved  Plan: no longer using hydroxyzine    (I10) Hypertension goal BP (blood pressure) < 150/90  Comment: to goal  Plan: losartan (COZAAR) 25 MG tablet, metoprolol         tartrate (LOPRESSOR) 50 MG tablet         Refill x 6 months     (N40.1,  R35.0) Benign prostatic hyperplasia with urinary frequency  Comment: stable  Plan: tamsulosin (FLOMAX) 0.4 MG capsule        Refill x 1 yr     (E66.811,  E66.09,  Z68.34) Class 1 obesity due to excess calories with serious comorbidity and body mass index (BMI) of 34.0 to 34.9 in adult  Comment: trending down  Plan: continue wt loss efforts        The longitudinal plan of care for the diagnosis(es)/condition(s) as documented were addressed during this visit. Due to the added complexity in care, I will continue to support Morris in the subsequent management and with ongoing continuity of care.    BMI  Estimated body mass index is 34.87 kg/m  as calculated from the following:    Height as of this encounter: 1.803 m (5' 11\").    Weight as of this encounter: 113.4 kg (250 lb).   Weight management plan: Discussed healthy diet and exercise guidelines    Counseling  Appropriate preventive services were addressed with this patient via screening, questionnaire, or discussion as appropriate for fall prevention, nutrition, physical activity, Tobacco-use cessation, social engagement, weight loss and cognition.  Checklist reviewing preventive services available has been given to the patient.  Reviewed " patient's diet, addressing concerns and/or questions.   The patient was provided with written information regarding signs of hearing loss.   Information on urinary incontinence and treatment options given to patient.           Subjective   Morris is a 79 year old, presenting for the following:  Medicare Visit and Physical        6/9/2025     1:57 PM   Additional Questions   Roomed by Diogenes WANG LPN   Accompanied by Self         6/9/2025     1:57 PM   Patient Reported Additional Medications   Patient reports taking the following new medications None          HPI  Doing well, no concerns for him, has concerns about spouse. Wt loss with Ozempic and unsure if pt is taking all meds.         Advance Care Planning    Discussed advance care planning with patient; informed AVS has link to [employer].        6/9/2025   General Health   How would you rate your overall physical health? Good   Feel stress (tense, anxious, or unable to sleep) Not at all         6/9/2025   Nutrition   Diet: Regular (no restrictions)         6/9/2025   Exercise   Days per week of moderate/strenous exercise 0 days   (!) EXERCISE CONCERN      6/9/2025   Social Factors   Frequency of gathering with friends or relatives Patient declined   Worry food won't last until get money to buy more No   Food not last or not have enough money for food? No   Do you have housing? (Housing is defined as stable permanent housing and does not include staying outside in a car, in a tent, in an abandoned building, in an overnight shelter, or couch-surfing.) Yes   Are you worried about losing your housing? No   Lack of transportation? No   Unable to get utilities (heat,electricity)? No         6/9/2025   Fall Risk   Fallen 2 or more times in the past year? No   Trouble with walking or balance? No          6/9/2025   Activities of Daily Living- Home Safety   Needs help with the following daily activites None of the above   Safety concerns in the home None of the above          2025   Dental   Dentist two times every year? Yes         2025   Hearing Screening   Hearing concerns? (!) IT'S HARD TO FOLLOW A CONVERSATION IN A NOISY RESTAURANT OR CROWDED ROOM.    (!) TROUBLE UNDERSTANDING SOFT OR WHISPERED SPEECH.    (!) TROUBLE UNDERSTANDING SPEECH ON THE TELEPHONE       Multiple values from one day are sorted in reverse-chronological order         2025   Driving Risk Screening   Patient/family members have concerns about driving No         2025   General Alertness/Fatigue Screening   Have you been more tired than usual lately? No         2025   Urinary Incontinence Screening   Bothered by leaking urine in past 6 months Yes         Today's PHQ-2 Score:       2025     1:49 PM   PHQ-2 (  [employer])   Q1: Little interest or pleasure in doing things 0   Q2: Feeling down, depressed or hopeless 0   PHQ-2 Score 0    Q1: Little interest or pleasure in doing things Not at all   Q2: Feeling down, depressed or hopeless Not at all   PHQ-2 Score 0       Patient-reported           2025   Substance Use   Alcohol more than 3/day or more than 7/wk No   Do you have a current opioid prescription? No   How severe/bad is pain from 1 to 10? 1/10   Do you use any other substances recreationally? No    (!) DECLINE       Multiple values from one day are sorted in reverse-chronological order     Social History     Tobacco Use    Smoking status: Former     Current packs/day: 0.00     Average packs/day: 1 pack/day for 15.3 years (15.3 ttl pk-yrs)     Types: Cigarettes, Pipe     Start date: 5/15/1968     Quit date: 1983     Years since quittin.8    Smokeless tobacco: Never   Vaping Use    Vaping status: Never Used   Substance Use Topics    Alcohol use: Yes     Comment: Minimal to light    Drug use: No       Family history of prostate cancer: No  Last PSA:   PSA   Date Value Ref Range Status   10/28/2020 2.44 0 - 4 ug/L Final     Comment:     Assay Method:  Chemiluminescence  using Siemens Vista analyzer     Prostate Specific Antigen Screen   Date Value Ref Range Status   01/15/2025 2.55 0.00 - 6.50 ng/mL Final     Doing self testicular exam: YES     Family history of colon cancer:No  Last colonscopy: 3/2025 q5y scope     Family history of CAD:Yes mother  Last Cholesterol:   Lab Results   Component Value Date    CHOL 171 05/29/2025    CHOL 165 04/21/2021     Lab Results   Component Value Date    HDL 75 05/29/2025    HDL 63 04/21/2021     Lab Results   Component Value Date    LDL 79 05/29/2025    LDL 80 04/21/2021     Lab Results   Component Value Date    TRIG 83 05/29/2025    TRIG 112 04/21/2021     Lab Results   Component Value Date    CHOLHDLRATIO 3.6 10/26/2015          Immunizations:    Td: tdap 6/2021  Covid: 5/2022  Flu: declined  Shingrix: declined  Pneumovax: declined  RSV: declined    Seat Belt:YES   Sunscreen use: YES  Calcium Intake: adeq  Health Care Directive:YES   Sexually Active:NO      Current contraception: none       Patient Ed:  Reviewed health maintenance including diet, regular exercise, and periodic exams.     The risks, benefits, and treatment options of prescribed medications or other treatments have been discussed with the patient.  The patient should call or schedule a follow up appt if no improvement or other problems.     ASCVD Risk   The 10-year ASCVD risk score (Jazmyn DK, et al., 2019) is: 28.9%    Values used to calculate the score:      Age: 79 years      Sex: Male      Is Non- : No      Diabetic: No      Tobacco smoker: No      Systolic Blood Pressure: 126 mmHg      Is BP treated: Yes      HDL Cholesterol: 75 mg/dL      Total Cholesterol: 171 mg/dL            Reviewed and updated as needed this visit by Provider   Tobacco  Allergies  Meds  Problems  Med Hx  Surg Hx  Fam Hx            Labs reviewed in EPIC  BP Readings from Last 3 Encounters:   06/09/25 126/82   03/26/25 119/80   06/25/24 135/83    Wt Readings from  Last 3 Encounters:   25 113.4 kg (250 lb)   25 115.7 kg (255 lb)   24 115.8 kg (255 lb 3.2 oz)                  Patient Active Problem List   Diagnosis    Hypertension goal BP (blood pressure) < 140/90    Eczema    Vitamin D deficiency    Colon polyp    Paralyzed hemidiaphragm    Hyperlipidemia LDL goal <130    Venous stasis dermatitis of left lower extremity    Bilateral leg edema    Class 1 obesity due to excess calories with serious comorbidity and body mass index (BMI) of 34.0 to 34.9 in adult    Vertical heterophoria    Carotid atherosclerosis, bilateral    Atherosclerosis of abdominal aorta    Combined forms of age-related cataract, right eye     Past Surgical History:   Procedure Laterality Date    ABDOMEN SURGERY      BIOPSY      Colon flat polyp 44mm (), 2mm leonico ()    COLONOSCOPY      Follow ups ,  &     COLONOSCOPY WITH CO2 INSUFFLATION N/A 2016    Procedure: COLONOSCOPY WITH CO2 INSUFFLATION;  Surgeon: Ariel Manzanares MD;  Location: [facility] OR    COLONOSCOPY WITH CO2 INSUFFLATION N/A 2019    Procedure: COLONOSCOPY, WITH CO2 INSUFFLATION;  Surgeon: Ariel Manzanares MD;  Location: [facility] OR    COLONOSCOPY WITH CO2 INSUFFLATION N/A 2025    Procedure: Colonoscopy with CO2 insufflation;  Surgeon: Lexus Fabian MD;  Location: [facility] OR    EYE SURGERY      left cataract     HERNIA REPAIR      HERNIA REPAIR      Double Hernia    TONSILLECTOMY         Social History     Tobacco Use    Smoking status: Former     Current packs/day: 0.00     Average packs/day: 1 pack/day for 15.3 years (15.3 ttl pk-yrs)     Types: Cigarettes, Pipe     Start date: 5/15/1968     Quit date: 1983     Years since quittin.8    Smokeless tobacco: Never   Substance Use Topics    Alcohol use: Yes     Comment: Minimal to light     Family History   Problem Relation Age of Onset    C.A.D. Mother     Heart Disease Mother     Diabetes Mother     Obesity Mother      Alcohol/Drug Father     Obesity Daughter     Obesity Daughter          Current Outpatient Medications   Medication Sig Dispense Refill    Ascorbic Acid (VITAMIN C PO) Take 2,000 mg by mouth.      aspirin (ASA) 81 MG EC tablet Take 325 mg by mouth daily      atorvastatin (LIPITOR) 40 MG tablet Take 1 tablet (40 mg) by mouth at bedtime. 90 tablet 3    calcium carbonate (OS-GENE 500 MG [place]. [place]) 500 MG tablet Take 1,000 mg by mouth daily      Cyanocobalamin (VITAMIN B-12 PO) Take 1,000 mg by mouth daily      hydrOXYzine HCl (ATARAX) 10 MG tablet Take 1 tablet (10 mg) by mouth nightly as needed for other (insomia/sleep) 90 tablet 1    lecithin 1200 MG CAPS Take 400 mg by mouth daily       losartan (COZAAR) 25 MG tablet Take 1 tablet (25 mg) by mouth 2 times daily. 180 tablet 1    metoprolol tartrate (LOPRESSOR) 50 MG tablet Take 1 tablet (50 mg) by mouth 2 times daily. 180 tablet 1    Omega-3 Fatty Acids (FISH OIL) 1200 MG capsule Take 2 capsules by mouth daily       tamsulosin (FLOMAX) 0.4 MG capsule Take 1 capsule (0.4 mg) by mouth daily. 90 capsule 1    VITAMIN D, CHOLECALCIFEROL, PO Take 5,000 Units by mouth daily      vitamin E 400 UNITS TABS Take 400 Units by mouth daily       Current providers sharing in care for this patient include:  Patient Care Team:  Riddhi Lopez MD as PCP - General (Family Practice)  Riddhi Lopez MD as Assigned PCP    The following health maintenance items are reviewed in Epic and correct as of today:  Health Maintenance   Topic Date Due    ANNUAL REVIEW OF HM ORDERS  Never done    INFLUENZA VACCINE (Season Ended) 09/01/2025    BMP  11/29/2025    LIPID  05/29/2026    MEDICARE ANNUAL WELLNESS VISIT  06/09/2026    FALL RISK ASSESSMENT  06/09/2026    DIABETES SCREENING  05/29/2028    ADVANCE CARE PLANNING  06/05/2029    COLORECTAL CANCER SCREENING  03/26/2030    DTAP/TDAP/TD VACCINE (3 - Td or Tdap) 06/10/2031    HEPATITIS C SCREENING  Completed    PHQ-2 (once per calendar  "year)  Completed    HPV VACCINE  Aged Out    MENINGITIS VACCINE  Aged Out    PNEUMOCOCCAL VACCINE 50+ YEARS  Discontinued    ZOSTER VACCINE  Discontinued    RSV VACCINE  Discontinued    LUNG CANCER SCREENING  Discontinued    COVID-19 VACCINE  Discontinued         Review of Systems  Constitutional, neuro, ENT, endocrine, pulmonary, cardiac, gastrointestinal, genitourinary, musculoskeletal, integument and psychiatric systems are negative, except as otherwise noted.     Objective    Exam  /82   Pulse 70   Temp 97.9  F (36.6  C) (Tympanic)   Resp 18   Ht 1.803 m (5' 11\")   Wt 113.4 kg (250 lb)   SpO2 97%   BMI 34.87 kg/m     Estimated body mass index is 34.87 kg/m  as calculated from the following:    Height as of this encounter: 1.803 m (5' 11\").    Weight as of this encounter: 113.4 kg (250 lb).    Physical Exam  GENERAL: alert and no distress  EYES: Eyes grossly normal to inspection, PERRL and conjunctivae and sclerae normal  HENT: ear canals and TM's normal, nose and mouth without ulcers or lesions  NECK: no adenopathy, no asymmetry, masses, or scars  RESP: lungs clear to auscultation - no rales, rhonchi or wheezes  CV: regular rate and rhythm, normal S1 S2, no S3 or S4, no murmur, click or rub, no peripheral edema  ABDOMEN: soft, nontender, no hepatosplenomegaly, no masses and bowel sounds normal  MS: no gross musculoskeletal defects noted, no edema  SKIN: no suspicious lesions or rashes  NEURO: Normal strength and tone, mentation intact and speech normal  PSYCH: mentation appears normal, affect normal/bright        6/9/2025   Mini Cog   Clock Draw Score 2 Normal   3 Item Recall 3 objects recalled   Mini Cog Total Score 5              Signed Electronically by: Riddhi Lopez MD    "

## (undated) DEVICE — PREP CHLORAPREP 26ML TINTED ORANGE  260815

## (undated) DEVICE — SOL WATER IRRIG 1000ML BOTTLE 07139-09

## (undated) RX ORDER — FENTANYL CITRATE 50 UG/ML
INJECTION, SOLUTION INTRAMUSCULAR; INTRAVENOUS
Status: DISPENSED
Start: 2019-09-09

## (undated) RX ORDER — FENTANYL CITRATE 50 UG/ML
INJECTION, SOLUTION INTRAMUSCULAR; INTRAVENOUS
Status: DISPENSED
Start: 2025-03-26

## (undated) RX ORDER — DIPHENHYDRAMINE HYDROCHLORIDE 50 MG/ML
INJECTION INTRAMUSCULAR; INTRAVENOUS
Status: DISPENSED
Start: 2019-09-09